# Patient Record
Sex: MALE | Race: WHITE | Employment: OTHER | URBAN - METROPOLITAN AREA
[De-identification: names, ages, dates, MRNs, and addresses within clinical notes are randomized per-mention and may not be internally consistent; named-entity substitution may affect disease eponyms.]

---

## 2020-09-08 ENCOUNTER — HOSPITAL ENCOUNTER (OUTPATIENT)
Dept: MRI IMAGING | Age: 70
Discharge: HOME | End: 2020-09-10
Payer: MEDICARE

## 2020-09-08 PROCEDURE — 70551 MRI BRAIN STEM W/O DYE: CPT

## 2021-12-14 ENCOUNTER — VIRTUAL VISIT (OUTPATIENT)
Dept: FAMILY MEDICINE CLINIC | Age: 71
End: 2021-12-14
Payer: MEDICARE

## 2021-12-14 DIAGNOSIS — R05.9 COUGH: Primary | ICD-10-CM

## 2021-12-14 DIAGNOSIS — Z11.52 ENCOUNTER FOR SCREENING FOR COVID-19: ICD-10-CM

## 2021-12-14 LAB
Lab: NORMAL
PERFORMING INSTRUMENT: NORMAL
QC PASS/FAIL: NORMAL
SARS-COV-2, POC: NORMAL

## 2021-12-14 PROCEDURE — 99441 PR PHYS/QHP TELEPHONE EVALUATION 5-10 MIN: CPT | Performed by: NURSE PRACTITIONER

## 2021-12-14 PROCEDURE — 87426 SARSCOV CORONAVIRUS AG IA: CPT | Performed by: NURSE PRACTITIONER

## 2021-12-14 SDOH — ECONOMIC STABILITY: FOOD INSECURITY: WITHIN THE PAST 12 MONTHS, THE FOOD YOU BOUGHT JUST DIDN'T LAST AND YOU DIDN'T HAVE MONEY TO GET MORE.: NEVER TRUE

## 2021-12-14 SDOH — ECONOMIC STABILITY: FOOD INSECURITY: WITHIN THE PAST 12 MONTHS, YOU WORRIED THAT YOUR FOOD WOULD RUN OUT BEFORE YOU GOT MONEY TO BUY MORE.: NEVER TRUE

## 2021-12-14 ASSESSMENT — ENCOUNTER SYMPTOMS
CHEST TIGHTNESS: 0
VOMITING: 0
ABDOMINAL PAIN: 0
SHORTNESS OF BREATH: 0
NAUSEA: 0
COUGH: 1
RHINORRHEA: 0
WHEEZING: 0
SORE THROAT: 0

## 2021-12-14 ASSESSMENT — PATIENT HEALTH QUESTIONNAIRE - PHQ9
SUM OF ALL RESPONSES TO PHQ QUESTIONS 1-9: 0
2. FEELING DOWN, DEPRESSED OR HOPELESS: 0
SUM OF ALL RESPONSES TO PHQ9 QUESTIONS 1 & 2: 0
SUM OF ALL RESPONSES TO PHQ QUESTIONS 1-9: 0
SUM OF ALL RESPONSES TO PHQ QUESTIONS 1-9: 0
1. LITTLE INTEREST OR PLEASURE IN DOING THINGS: 0

## 2021-12-14 ASSESSMENT — SOCIAL DETERMINANTS OF HEALTH (SDOH): HOW HARD IS IT FOR YOU TO PAY FOR THE VERY BASICS LIKE FOOD, HOUSING, MEDICAL CARE, AND HEATING?: NOT HARD AT ALL

## 2021-12-14 NOTE — LETTER
[address]  Phone: [phone]  Fax: [phone]    MAKI Avila - JAY    December 14, 2021     Gene Pryor MD  [address] [place] [address]    Patient: Lee King   MR Number: [id number]   YOB: 1950   Date of Visit: 12/14/2021       Dear Gene Pryor: Thank you for referring Nathaniel Esposito to me for evaluation/treatment. Below are the relevant portions of my assessment and plan of care:    1. Cough  Comments:  negative rapid test for SARS-CoV-2 viral antigen. continue symptomatic care- upright position, hydration, inhaler/ nebulizer prn. follow-up with PCP as planned. 2. Encounter for screening for COVID-19  Comments:  negative POC test for SARS-CoV-2. results discussed with patient. follow-up with PCP as planned; return to [facility] prn for acute illness. Orders:  -     POCT COVID-19, Antigen    SARS-COV-2, POC   Date Value Ref Range Status   12/14/2021 Not-Detected Not Detected Final     If you have questions, please do not hesitate to call me.         Sincerely,      MAKI Avila CNP

## 2021-12-14 NOTE — PROGRESS NOTES
[address] Encounter    TELEHEALTH VISIT -- Audio Only     SUBJECTIVE:    Mary Ellis is a 70 y.o. male evaluated via telephone on 12/14/2021. Consent:  Angelia Anirudh and/or health care decision maker is aware that he may receive a bill for this telephone service, depending on his insurance coverage, and has provided verbal consent to proceed: Yes    Documentation:  I communicated with the patient and/or health care decision maker about:  Chief Complaint   Patient presents with    Covid Testing     needs a rapid test before seeing Dr. Joseph Castellano        History of Present Illness:  Patient presents for COVID-19 screening requested by PCP prior to office visit for evaluation of cough. Presenting symptoms: productive cough- clear sputum. Was planning to see PCP for cough and PCP requested he be tested for COVID-19 before coming into the office. Patient states cough symptoms have improved, \"but I have COPD and I get pneumonia every year, so my wife wanted me to get in with my doctor and get checked out. \"    Symptoms began: ~ 1-2 weeks ago, waxing and waning, improved overall. Max temperature in last 24 hrs: afebrile. Patient denies: fever, chills, sore throat, nasal congestion, shortness of breath at rest, chest pain, diarrhea, nausea and vomiting, headache, muscle pain, abdominal pain, loss of smell, loss of taste, malaise. Treatment to date: nebulized albuterol BID, which has been effective. Exposure source: no known exposures to COVID-19. Relevant PMH: COPD, pneumonia. Non-smoker. No recent travel. Fully vaccinated for COVID-19 + booster. Review of Systems   Constitutional: Negative for chills, fever and unexpected weight change. HENT: Negative for congestion, rhinorrhea and sore throat. Respiratory: Positive for cough. Negative for chest tightness, shortness of breath and wheezing. Cardiovascular: Negative for chest pain.    Gastrointestinal: Negative for abdominal pain, nausea and vomiting. Musculoskeletal: Negative for myalgias. Neurological: Negative for light-headedness and headaches. OBJECTIVE:     Vital Signs: (As obtained by: pt or caregiver): patient did not have necessary equipment to check vitals. Exam: N/A  (telehealth audio visit)     POC Testing Today:   Lab Results   Component Value Date    COVID19 Not-Detected 12/14/2021       TELEHEALTH ASSESSMENT & PLAN:   Details of this discussion including any medical advice provided:     70 y.o. male with cough and need for COVID-19 screening. 1. Cough  Comments:  negative rapid test for SARS-CoV-2 viral antigen. continue symptomatic care- upright position, hydration, inhaler/ nebulizer prn. follow-up with PCP as planned. 2. Encounter for screening for COVID-19  Comments:  negative POC test for SARS-CoV-2. results discussed with patient. follow-up with PCP as planned; return to [facility] prn for acute illness. Orders:  -     POCT COVID-19, Antigen    - POC testing per orders. - Analgesia/ fever control with OTC acetaminophen prn.  - Advised on supportive measures- upright position, hydration, intranasal saline prn congestion.  - Red flags and expected time course for resolution were reviewed. - Follow-up with PCP as planned. Return for follow-up with PCP per routine; return to [facility] as needed. I affirm this is a Patient Initiated Episode with an Established Patient who has not had a related appointment within my department in the past 7 days or scheduled within the next 24 hours. Total Time: minutes: 5-10 minutes    TELEHEALTH EVALUATION -- Audio/Telephone (During ZARXJ-87 public health emergency)  This service was provided through telehealth, by MAKI Jones Cha, CNP and the patient in his/her home via telephone call. 5 minutes were spent on the phone with patient. Provider performed history of present illness and review of systems.  Diagnosis and treatment plan was discussed with patient. Pharmacy of choice was reviewed along with past medical history, medication allergies, and current medications. Education provided to patient or patient parents/guardian with current illness diagnosis as well as when to seek additional healthcare due to changing or for worsening symptoms. Patient voiced understanding.      Electronically signed by MAKI Khan CNP on 12/14/2021 at 10:32 AM

## 2021-12-14 NOTE — PATIENT INSTRUCTIONS
1. Results from rapid COVID-19 test were: negative. 2. Continue albuterol nebulizers per routine. 3. Follow-up with your PCP as planned. SARS-COV-2, POC   Date Value Ref Range Status   12/14/2021 Not-Detected Not Detected Final       Patient Education        Learning About Being High-Risk for Serious Illness From COVID-19  Who is at high risk? COVID-19 causes a mild illness in many people who have it. But certain things may increase your risk for more serious illness. These include:  · Age. ? Babies born premature or who are less than 3year old may be at high risk. ? The risk also increases with age. Older adults are at highest risk. · Asthma, cystic fibrosis, chronic obstructive pulmonary disease (COPD), and other chronic lung diseases. · Vaping or smoking or having a history of smoking. · Serious heart conditions, such as heart failure, coronary artery disease, or high blood pressure. · HIV. · A weakened immune system or taking medicines, such as steroids, that suppress the immune system. · Cancer or getting treatment for cancer. · Neurologic conditions or diseases that involve the nerves and brain, such as stroke, dementia, or cerebral palsy. · Being overweight (obesity). · Diabetes. · Chronic kidney disease. · Liver disease. · Substance use disorders. · Sickle cell disease. · Pregnancy. · Genetic, metabolic, or neurologic problems in children. This includes children who may have many health problems that affect many body systems. These problems may limit how well the child can do routine activities of daily life. · Down syndrome. This is not a complete list. If you have a chronic health problem, ask your doctor if you should take extra precautions. Should you get the COVID-19 vaccine if you have an underlying health problem? The simple answer is yes. The COVID-19 vaccine is safe and effective for almost everyone.  The only people advised not to get it are those who have had a severe allergic reaction to the vaccine's ingredients. Experts recommend getting the vaccine. This is especially true if you have an underlying health problem like diabetes, chronic lung disease, or obesity. Getting infected with COVID-19 can be much worse if you have conditions like these. If you have a weakened immune system, you may be at higher risk for getting seriously ill with COVID-19. The vaccine may not work as well for you, but it should still be safe. Talk with your doctor if you have any questions about the vaccine. How can you protect yourself and others? The best way to protect yourself from getting sick is to:  · Get vaccinated. · Avoid sick people. · If you are not fully vaccinated:  ? Wear a mask if you have to go to public areas. ? Avoid crowds and try to stay at least 6 feet away from other people. · Cover your mouth with a tissue when you cough or sneeze. · Wash your hands often, especially after you cough or sneeze. Use soap and water, and scrub for at least 20 seconds. If soap and water aren't available, use an alcohol-based hand . · Avoid touching your mouth, nose, and eyes. To help avoid spreading the virus to others:  · Get vaccinated. · Cover your mouth with a tissue when you cough or sneeze. · Wash your hands often, especially after you cough or sneeze. Use soap and water, and scrub for at least 20 seconds. If soap and water aren't available, use an alcohol-based hand . · If you have been exposed to the virus and are not fully vaccinated:  ? Stay home. Don't go to school, work, or public areas. And don't use public transportation, ride-shares, or taxis unless you have no choice. ? Wear a mask if you have to go to public areas, like the pharmacy. · If you're sick:  ? Leave your home only if you need to get medical care. But call the doctor's office first so they know you're coming. And wear a mask. ? Wear a mask whenever you're around other people. ?  Limit contact with pets and people in your home. If possible, stay in a separate bedroom and use a separate bathroom. ? Clean and disinfect your home every day. Use household  and disinfectant wipes or sprays. Take special care to clean things that you touch with your hands. Should you get the COVID-19 vaccine if you are pregnant? Talk with your doctor if you have any questions about the vaccine. Other vaccines, like the flu vaccine, are safely given in pregnancy. The risk of problems from the COVID-19 vaccine should be far smaller than the risks to you and your baby from having the infection. Where can you learn more? Go to https://[employer] (formerly [employer]Partnerpedia. org and sign in to your [employer] account. Enter A131 in the [employer] box to learn more about \"Learning About Being High-Risk for Serious Illness From COVID-19. \"     If you do not have an account, please click on the \"Sign Up Now\" link. Current as of: July 1, 2021               Content Version: 13.0  © 2006-2021 Healthwise, Incorporated. Care instructions adapted under license by [facility] ([facility]). If you have questions about a medical condition or this instruction, always ask your healthcare professional. [address] any warranty or liability for your use of this information.

## 2023-02-27 LAB
ESTIMATED AVERAGE GLUCOSE FOR HBA1C: 203 MG/DL
HEMOGLOBIN A1C/HEMOGLOBIN TOTAL IN BLOOD: 8.7 %
PROSTATE SPECIFIC ANTIGEN,SCREEN: 0.46 NG/ML (ref 0–4)

## 2023-03-06 ENCOUNTER — DOCUMENTATION (OUTPATIENT)
Dept: CARE COORDINATION | Facility: CLINIC | Age: 73
End: 2023-03-06
Payer: MEDICARE

## 2023-03-06 NOTE — PROGRESS NOTES
CM following for diabetes mngt ( AiC 8.7  2/28/23)  Hx of HTN COPD and proliferative diabetic retinopathy R eye..  No new episodes of care.   Per February PCP note, treated for AECOPD with antibiotic and steroids.   Cologuard ordered.   Patient is compliant with medications and appointments.    Patient states he is feeling better, continues to have productive cough of white secretions and using nebulizer and nebulizer as needed.  Patient states blood sugars were elevated while on steroid.    CM educates patient to contact PCP if secretions change color, have increased cough, or shortness of breath.   Patient states is waiting for Cologuard to arrive in mail.

## 2023-03-13 DIAGNOSIS — E11.59 TYPE 2 DIABETES MELLITUS WITH OTHER CIRCULATORY COMPLICATION, WITH LONG-TERM CURRENT USE OF INSULIN (MULTI): Primary | ICD-10-CM

## 2023-03-13 DIAGNOSIS — J44.9 CHRONIC OBSTRUCTIVE PULMONARY DISEASE, UNSPECIFIED COPD TYPE (MULTI): ICD-10-CM

## 2023-03-13 DIAGNOSIS — Z79.4 TYPE 2 DIABETES MELLITUS WITH OTHER CIRCULATORY COMPLICATION, WITH LONG-TERM CURRENT USE OF INSULIN (MULTI): Primary | ICD-10-CM

## 2023-03-13 RX ORDER — INSULIN GLARGINE 300 U/ML
INJECTION, SOLUTION SUBCUTANEOUS
Qty: 36 ML | Refills: 0 | Status: SHIPPED | OUTPATIENT
Start: 2023-03-13 | End: 2023-03-13 | Stop reason: SDUPTHER

## 2023-03-13 RX ORDER — INSULIN GLARGINE 300 U/ML
50 INJECTION, SOLUTION SUBCUTANEOUS 2 TIMES DAILY
Qty: 36 ML | Refills: 0 | Status: SHIPPED | OUTPATIENT
Start: 2023-03-13 | End: 2023-09-07 | Stop reason: DRUGHIGH

## 2023-03-15 ENCOUNTER — DOCUMENTATION (OUTPATIENT)
Dept: CARE COORDINATION | Facility: CLINIC | Age: 73
End: 2023-03-15
Payer: MEDICARE

## 2023-03-15 NOTE — PROGRESS NOTES
CM UPDATE:  Call from patient that cough has decreased to baseline; continues to use Duoneb nebulizer twice daily..  Patient states has completed steroids and glucose levels have normalized. Patient states has not received Colgard yet and has contacted PCP office for such    Carotid ultrasound with vascular surgery next week.

## 2023-03-16 DIAGNOSIS — I25.110 ATHEROSCLEROSIS OF NATIVE CORONARY ARTERY WITH UNSTABLE ANGINA PECTORIS, UNSPECIFIED WHETHER NATIVE OR TRANSPLANTED HEART (MULTI): ICD-10-CM

## 2023-03-16 RX ORDER — NITROGLYCERIN 0.4 MG/1
TABLET SUBLINGUAL
Qty: 25 TABLET | Refills: 0 | Status: SHIPPED | OUTPATIENT
Start: 2023-03-16

## 2023-03-24 DIAGNOSIS — J44.9 CHRONIC OBSTRUCTIVE PULMONARY DISEASE, UNSPECIFIED COPD TYPE (MULTI): ICD-10-CM

## 2023-03-24 RX ORDER — ALBUTEROL SULFATE 0.63 MG/3ML
0.63 SOLUTION RESPIRATORY (INHALATION) EVERY 6 HOURS PRN
Qty: 75 ML | Refills: 11 | Status: SHIPPED | OUTPATIENT
Start: 2023-03-24 | End: 2023-03-25

## 2023-03-25 RX ORDER — ALBUTEROL SULFATE 0.83 MG/ML
SOLUTION RESPIRATORY (INHALATION)
Qty: 75 ML | Refills: 11 | Status: SHIPPED | OUTPATIENT
Start: 2023-03-25 | End: 2023-03-31 | Stop reason: SDUPTHER

## 2023-03-31 DIAGNOSIS — J44.9 CHRONIC OBSTRUCTIVE PULMONARY DISEASE, UNSPECIFIED COPD TYPE (MULTI): ICD-10-CM

## 2023-03-31 RX ORDER — ALBUTEROL SULFATE 0.83 MG/ML
2.5 SOLUTION RESPIRATORY (INHALATION) EVERY 6 HOURS PRN
Qty: 75 ML | Refills: 11 | Status: SHIPPED | OUTPATIENT
Start: 2023-03-31 | End: 2023-06-01 | Stop reason: SDUPTHER

## 2023-04-03 ENCOUNTER — DOCUMENTATION (OUTPATIENT)
Dept: CARE COORDINATION | Facility: CLINIC | Age: 73
End: 2023-04-03
Payer: MEDICARE

## 2023-04-10 ENCOUNTER — DOCUMENTATION (OUTPATIENT)
Dept: CARE COORDINATION | Facility: CLINIC | Age: 73
End: 2023-04-10
Payer: MEDICARE

## 2023-04-10 SDOH — ECONOMIC STABILITY: INCOME INSECURITY: HOW HARD IS IT FOR YOU TO PAY FOR THE VERY BASICS LIKE FOOD, HOUSING, MEDICAL CARE, AND HEATING?: NOT HARD AT ALL

## 2023-04-10 SDOH — ECONOMIC STABILITY: FOOD INSECURITY: WITHIN THE PAST 12 MONTHS, YOU WORRIED THAT YOUR FOOD WOULD RUN OUT BEFORE YOU GOT MONEY TO BUY MORE.: NEVER TRUE

## 2023-04-10 SDOH — HEALTH STABILITY: MENTAL HEALTH: HOW MANY STANDARD DRINKS CONTAINING ALCOHOL DO YOU HAVE ON A TYPICAL DAY?: PATIENT DOES NOT DRINK

## 2023-04-10 SDOH — SOCIAL STABILITY: SOCIAL NETWORK: ARE YOU MARRIED, WIDOWED, DIVORCED, SEPARATED, NEVER MARRIED, OR LIVING WITH A PARTNER?: MARRIED

## 2023-04-10 SDOH — ECONOMIC STABILITY: TRANSPORTATION INSECURITY
IN THE PAST 12 MONTHS, HAS THE LACK OF TRANSPORTATION KEPT YOU FROM MEDICAL APPOINTMENTS OR FROM GETTING MEDICATIONS?: NO

## 2023-04-10 SDOH — ECONOMIC STABILITY: INCOME INSECURITY: IN THE LAST 12 MONTHS, WAS THERE A TIME WHEN YOU WERE NOT ABLE TO PAY THE MORTGAGE OR RENT ON TIME?: NO

## 2023-04-10 SDOH — ECONOMIC STABILITY: GENERAL: WOULD YOU LIKE HELP WITH ANY OF THE FOLLOWING NEEDS?: I DONT NEED HELP WITH ANY OF THESE

## 2023-04-19 ENCOUNTER — PATIENT OUTREACH (OUTPATIENT)
Dept: CARE COORDINATION | Facility: CLINIC | Age: 73
End: 2023-04-19
Payer: MEDICARE

## 2023-04-24 ENCOUNTER — DOCUMENTATION (OUTPATIENT)
Dept: CARE COORDINATION | Facility: CLINIC | Age: 73
End: 2023-04-24
Payer: MEDICARE

## 2023-04-28 ENCOUNTER — PATIENT OUTREACH (OUTPATIENT)
Dept: CARE COORDINATION | Facility: CLINIC | Age: 73
End: 2023-04-28
Payer: MEDICARE

## 2023-04-28 SDOH — ECONOMIC STABILITY: GENERAL: WOULD YOU LIKE HELP WITH ANY OF THE FOLLOWING NEEDS?: I DONT NEED HELP WITH ANY OF THESE

## 2023-05-10 ENCOUNTER — PATIENT OUTREACH (OUTPATIENT)
Dept: CARE COORDINATION | Facility: CLINIC | Age: 73
End: 2023-05-10
Payer: MEDICARE

## 2023-05-12 ENCOUNTER — PATIENT OUTREACH (OUTPATIENT)
Dept: CARE COORDINATION | Facility: CLINIC | Age: 73
End: 2023-05-12
Payer: MEDICARE

## 2023-05-17 ENCOUNTER — DOCUMENTATION (OUTPATIENT)
Dept: CARE COORDINATION | Facility: CLINIC | Age: 73
End: 2023-05-17
Payer: MEDICARE

## 2023-05-30 ENCOUNTER — APPOINTMENT (OUTPATIENT)
Dept: PRIMARY CARE | Facility: CLINIC | Age: 73
End: 2023-05-30
Payer: MEDICARE

## 2023-05-31 ENCOUNTER — DOCUMENTATION (OUTPATIENT)
Dept: CARE COORDINATION | Facility: CLINIC | Age: 73
End: 2023-05-31
Payer: MEDICARE

## 2023-06-01 DIAGNOSIS — J44.9 CHRONIC OBSTRUCTIVE PULMONARY DISEASE, UNSPECIFIED COPD TYPE (MULTI): ICD-10-CM

## 2023-06-01 RX ORDER — ALBUTEROL SULFATE 0.83 MG/ML
2.5 SOLUTION RESPIRATORY (INHALATION) EVERY 6 HOURS PRN
Qty: 75 ML | Refills: 11 | Status: SHIPPED | OUTPATIENT
Start: 2023-06-01 | End: 2023-09-07 | Stop reason: SDUPTHER

## 2023-06-02 ENCOUNTER — DOCUMENTATION (OUTPATIENT)
Dept: CARE COORDINATION | Facility: CLINIC | Age: 73
End: 2023-06-02
Payer: MEDICARE

## 2023-06-02 ENCOUNTER — PATIENT OUTREACH (OUTPATIENT)
Dept: CARE COORDINATION | Facility: CLINIC | Age: 73
End: 2023-06-02
Payer: MEDICARE

## 2023-06-02 SDOH — HEALTH STABILITY: MENTAL HEALTH: HOW OFTEN DO YOU HAVE A DRINK CONTAINING ALCOHOL?: NEVER

## 2023-06-02 SDOH — ECONOMIC STABILITY: FOOD INSECURITY: WITHIN THE PAST 12 MONTHS, YOU WORRIED THAT YOUR FOOD WOULD RUN OUT BEFORE YOU GOT MONEY TO BUY MORE.: NEVER TRUE

## 2023-06-02 SDOH — ECONOMIC STABILITY: TRANSPORTATION INSECURITY
IN THE PAST 12 MONTHS, HAS LACK OF TRANSPORTATION KEPT YOU FROM MEETINGS, WORK, OR FROM GETTING THINGS NEEDED FOR DAILY LIVING?: NO

## 2023-06-02 SDOH — ECONOMIC STABILITY: GENERAL: WOULD YOU LIKE HELP WITH ANY OF THE FOLLOWING NEEDS?: I DONT NEED HELP WITH ANY OF THESE

## 2023-06-02 SDOH — SOCIAL STABILITY: SOCIAL NETWORK: ARE YOU MARRIED, WIDOWED, DIVORCED, SEPARATED, NEVER MARRIED, OR LIVING WITH A PARTNER?: MARRIED

## 2023-06-02 SDOH — ECONOMIC STABILITY: INCOME INSECURITY: IN THE LAST 12 MONTHS, WAS THERE A TIME WHEN YOU WERE NOT ABLE TO PAY THE MORTGAGE OR RENT ON TIME?: NO

## 2023-06-02 NOTE — PROGRESS NOTES
[facility] 5/29/23 to 6/1/23 with AECOPD and new onset heart failure.   Home on Lasix and Prednisone taper.  Appointment with pulmonology 6/7/23.  Patient will contact PCP and Dr Domingo for hospital follow up appointments.  educated patient to weight self daily reportable parameters, and read food labels to decrease salt intake to 2 gm per day.   Patient states no longer adding salt to food    Engagement  Call Start Time: 1218 (6/2/2023 12:17 PM)    Medications  Medications reviewed with patient/caregiver?: Yes (6/2/2023 12:17 PM)  Is the patient having any side effects they believe may be caused by any medication additions or changes?: No (6/2/2023 12:17 PM)  Does the patient have all medications ordered at discharge?: Yes (6/2/2023 12:17 PM)  Care Management Interventions: No intervention needed (6/2/2023 12:17 PM)  Is the patient taking all medications as directed (includes completed medication regime)?: Yes (6/2/2023 12:17 PM)    Appointments  Does the patient have a primary care provider?: Yes (6/2/2023 12:17 PM)  Care Management Interventions: Advised patient to make appointment (6/2/2023 12:17 PM)  Has the patient kept scheduled appointments due by today?: Yes (6/2/2023 12:17 PM)    Patient Teaching  Does the patient have access to their discharge instructions?: Yes (6/2/2023 12:17 PM)  Care Management Interventions: Reviewed instructions with patient (6/2/2023 12:17 PM)  What is the patient's perception of their health status since discharge?: Improving (6/2/2023 12:17 PM)      Pam OLGUIN RN CCM  RN Care Coordinator  [facility]  Phone [phone]

## 2023-06-05 ENCOUNTER — PATIENT OUTREACH (OUTPATIENT)
Dept: CARE COORDINATION | Facility: CLINIC | Age: 73
End: 2023-06-05
Payer: MEDICARE

## 2023-06-08 ENCOUNTER — OFFICE VISIT (OUTPATIENT)
Dept: PRIMARY CARE | Facility: CLINIC | Age: 73
End: 2023-06-08
Payer: MEDICARE

## 2023-06-08 VITALS
WEIGHT: 285 LBS | HEIGHT: 75 IN | TEMPERATURE: 98 F | DIASTOLIC BLOOD PRESSURE: 60 MMHG | HEART RATE: 65 BPM | SYSTOLIC BLOOD PRESSURE: 90 MMHG | BODY MASS INDEX: 35.43 KG/M2

## 2023-06-08 DIAGNOSIS — E11.3511 PROLIFERATIVE DIABETIC RETINOPATHY OF RIGHT EYE WITH MACULAR EDEMA ASSOCIATED WITH TYPE 2 DIABETES MELLITUS (MULTI): ICD-10-CM

## 2023-06-08 DIAGNOSIS — E11.51 TYPE 2 DIABETES MELLITUS WITH DIABETIC PERIPHERAL ANGIOPATHY WITHOUT GANGRENE, WITH LONG-TERM CURRENT USE OF INSULIN (MULTI): Primary | ICD-10-CM

## 2023-06-08 DIAGNOSIS — J44.9 CHRONIC OBSTRUCTIVE PULMONARY DISEASE, UNSPECIFIED COPD TYPE (MULTI): ICD-10-CM

## 2023-06-08 DIAGNOSIS — I25.10 CORONARY ARTERY DISEASE INVOLVING NATIVE CORONARY ARTERY OF NATIVE HEART WITHOUT ANGINA PECTORIS: ICD-10-CM

## 2023-06-08 DIAGNOSIS — Z79.4 TYPE 2 DIABETES MELLITUS WITH DIABETIC PERIPHERAL ANGIOPATHY WITHOUT GANGRENE, WITH LONG-TERM CURRENT USE OF INSULIN (MULTI): Primary | ICD-10-CM

## 2023-06-08 DIAGNOSIS — E66.01 OBESITY, MORBID (MULTI): ICD-10-CM

## 2023-06-08 PROBLEM — I65.29 OCCLUSION AND STENOSIS OF UNSPECIFIED CAROTID ARTERY: Status: ACTIVE | Noted: 2018-06-04

## 2023-06-08 PROBLEM — I10 ESSENTIAL HYPERTENSION, BENIGN: Status: ACTIVE | Noted: 2018-02-01

## 2023-06-08 PROBLEM — K21.9 GERD WITHOUT ESOPHAGITIS: Status: ACTIVE | Noted: 2019-04-07

## 2023-06-08 PROBLEM — I65.23 BILATERAL CAROTID ARTERY STENOSIS: Status: ACTIVE | Noted: 2023-06-08

## 2023-06-08 PROBLEM — E78.2 MIXED HYPERLIPIDEMIA: Status: ACTIVE | Noted: 2018-05-04

## 2023-06-08 PROBLEM — H34.8120 CENTRAL RETINAL VEIN OCCLUSION WITH MACULAR EDEMA OF LEFT EYE (CMS-HCC): Status: ACTIVE | Noted: 2023-06-08

## 2023-06-08 PROCEDURE — 3052F HG A1C>EQUAL 8.0%<EQUAL 9.0%: CPT | Performed by: INTERNAL MEDICINE

## 2023-06-08 PROCEDURE — 3066F NEPHROPATHY DOC TX: CPT | Performed by: INTERNAL MEDICINE

## 2023-06-08 PROCEDURE — 3074F SYST BP LT 130 MM HG: CPT | Performed by: INTERNAL MEDICINE

## 2023-06-08 PROCEDURE — 3008F BODY MASS INDEX DOCD: CPT | Performed by: INTERNAL MEDICINE

## 2023-06-08 PROCEDURE — 1157F ADVNC CARE PLAN IN RCRD: CPT | Performed by: INTERNAL MEDICINE

## 2023-06-08 PROCEDURE — 4010F ACE/ARB THERAPY RXD/TAKEN: CPT | Performed by: INTERNAL MEDICINE

## 2023-06-08 PROCEDURE — 99214 OFFICE O/P EST MOD 30 MIN: CPT | Performed by: INTERNAL MEDICINE

## 2023-06-08 PROCEDURE — 3078F DIAST BP <80 MM HG: CPT | Performed by: INTERNAL MEDICINE

## 2023-06-08 PROCEDURE — 1036F TOBACCO NON-USER: CPT | Performed by: INTERNAL MEDICINE

## 2023-06-08 PROCEDURE — 1160F RVW MEDS BY RX/DR IN RCRD: CPT | Performed by: INTERNAL MEDICINE

## 2023-06-08 PROCEDURE — 1159F MED LIST DOCD IN RCRD: CPT | Performed by: INTERNAL MEDICINE

## 2023-06-08 RX ORDER — AMLODIPINE BESYLATE 10 MG/1
10 TABLET ORAL EVERY EVENING
COMMUNITY
End: 2023-11-07

## 2023-06-08 RX ORDER — LANOLIN ALCOHOL/MO/W.PET/CERES
1 CREAM (GRAM) TOPICAL DAILY
COMMUNITY

## 2023-06-08 RX ORDER — INSULIN ASPART 100 [IU]/ML
INJECTION, SOLUTION INTRAVENOUS; SUBCUTANEOUS
COMMUNITY

## 2023-06-08 RX ORDER — PANTOPRAZOLE SODIUM 40 MG/1
1 TABLET, DELAYED RELEASE ORAL DAILY
COMMUNITY

## 2023-06-08 RX ORDER — CLOPIDOGREL BISULFATE 75 MG/1
75 TABLET ORAL DAILY
COMMUNITY
End: 2023-07-31

## 2023-06-08 RX ORDER — FUROSEMIDE 20 MG/1
1 TABLET ORAL DAILY
Qty: 29 TABLET | Refills: 0 | COMMUNITY
Start: 2023-06-01 | End: 2024-03-08 | Stop reason: ALTCHOICE

## 2023-06-08 RX ORDER — LOSARTAN POTASSIUM 50 MG/1
50 TABLET ORAL 2 TIMES DAILY
COMMUNITY
End: 2023-09-07 | Stop reason: DRUGHIGH

## 2023-06-08 RX ORDER — METFORMIN HYDROCHLORIDE 1000 MG/1
TABLET ORAL 2 TIMES DAILY
COMMUNITY
End: 2024-04-15

## 2023-06-08 RX ORDER — IPRATROPIUM BROMIDE AND ALBUTEROL SULFATE 2.5; .5 MG/3ML; MG/3ML
3 SOLUTION RESPIRATORY (INHALATION) EVERY 8 HOURS PRN
COMMUNITY
Start: 2020-01-23 | End: 2023-12-11 | Stop reason: SDUPTHER

## 2023-06-08 RX ORDER — BIMATOPROST 0.1 MG/ML
SOLUTION/ DROPS OPHTHALMIC
COMMUNITY
End: 2024-02-09 | Stop reason: SDUPTHER

## 2023-06-08 RX ORDER — ASPIRIN 81 MG/1
1 TABLET ORAL DAILY
COMMUNITY
Start: 2007-12-04

## 2023-06-08 RX ORDER — CARVEDILOL 12.5 MG/1
12.5 TABLET ORAL
COMMUNITY
End: 2023-12-04

## 2023-06-08 RX ORDER — DORZOLAMIDE HYDROCHLORIDE AND TIMOLOL MALEATE 20; 5 MG/ML; MG/ML
1 SOLUTION/ DROPS OPHTHALMIC 2 TIMES DAILY
COMMUNITY
Start: 2020-10-20 | End: 2024-02-09 | Stop reason: WASHOUT

## 2023-06-08 RX ORDER — BUDESONIDE AND FORMOTEROL FUMARATE DIHYDRATE 160; 4.5 UG/1; UG/1
2 AEROSOL RESPIRATORY (INHALATION) 2 TIMES DAILY
COMMUNITY
Start: 2022-04-20 | End: 2023-12-07 | Stop reason: WASHOUT

## 2023-06-08 RX ORDER — ATORVASTATIN CALCIUM 80 MG/1
80 TABLET, FILM COATED ORAL NIGHTLY
COMMUNITY
End: 2024-01-29

## 2023-06-08 ASSESSMENT — ENCOUNTER SYMPTOMS
VISUAL CHANGE: 1
ABDOMINAL PAIN: 0
BLURRED VISION: 1
SHORTNESS OF BREATH: 1
FATIGUE: 0
CONSTITUTIONAL NEGATIVE: 1
DIAPHORESIS: 0
GASTROINTESTINAL NEGATIVE: 1
CHILLS: 0
MUSCULOSKELETAL NEGATIVE: 1
COUGH: 0
CARDIOVASCULAR NEGATIVE: 1
NEUROLOGICAL NEGATIVE: 1

## 2023-06-08 ASSESSMENT — COPD QUESTIONNAIRES: COPD: 1

## 2023-06-08 NOTE — PROGRESS NOTES
Subjective   Patient ID: Haile Mcfarland is a 72 y.o. male who presents for After hospital visit.    Heart Problem  This is a chronic problem. The current episode started more than 1 year ago. The problem occurs intermittently. The problem has been resolved. Associated symptoms include a visual change. Pertinent negatives include no abdominal pain, chills, coughing, diaphoresis or fatigue. The treatment provided mild relief.   Diabetes  He presents for his follow-up diabetic visit. He has type 2 diabetes mellitus. No MedicAlert identification noted. His disease course has been stable. Associated symptoms include blurred vision, foot paresthesias and visual change. Pertinent negatives for diabetes include no fatigue. Symptoms are stable. Diabetic complications include heart disease and peripheral neuropathy. Risk factors for coronary artery disease include dyslipidemia, hypertension and obesity. Current diabetic treatment includes insulin injections and oral agent (monotherapy). He is compliant with treatment all of the time. There is no change in his home blood glucose trend.   Shortness of Breath  This is a chronic problem. The current episode started 1 to 4 weeks ago. The problem occurs every several days. The problem has been unchanged. Pertinent negatives include no abdominal pain or leg swelling. The treatment provided mild relief. His past medical history is significant for CAD and COPD. There is no history of a heart failure, PE or pneumonia.        Review of Systems   Constitutional: Negative.  Negative for chills, diaphoresis and fatigue.   HENT: Negative.     Eyes:  Positive for blurred vision and visual disturbance.   Respiratory:  Positive for shortness of breath. Negative for cough.    Cardiovascular: Negative.  Negative for leg swelling.   Gastrointestinal: Negative.  Negative for abdominal pain.   Musculoskeletal: Negative.    Skin: Negative.    Neurological: Negative.        Objective   BP 90/60 (BP  "Location: Right arm, Patient Position: Sitting, BP Cuff Size: Adult)   Pulse 65   Temp 36.7 °C (98 °F) (Temporal)   Ht 1.905 m (6' 3\")   Wt 129 kg (285 lb)   BMI 35.62 kg/m²     Physical Exam  Vitals reviewed.   Constitutional:       Appearance: Normal appearance. He is obese.   HENT:      Head: Normocephalic.   Eyes:      Conjunctiva/sclera: Conjunctivae normal.   Cardiovascular:      Rate and Rhythm: Normal rate and regular rhythm.      Pulses: Normal pulses.   Pulmonary:      Effort: Pulmonary effort is normal.      Breath sounds: Normal breath sounds.   Abdominal:      Palpations: Abdomen is soft.   Musculoskeletal:         General: Normal range of motion.      Cervical back: Normal range of motion.   Skin:     General: Skin is warm and dry.   Neurological:      General: No focal deficit present.       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 Pt is doing well, precautions to be taken in the future for acute ailments or acute illness and change of condition are discussed, will continue to have close follow up, medication list was reviewed and updated and necessary changes were applied.   Pt does not have any angina lately, aware of using NTG if needed, aware to notify " MD if any new chest pains happens. Compliance is appropriate. Statin therapy reviewed,     Pt has moderate to severe COPD. It is progressive disease, use of MDI and proper technique discussed, rinse mouth after inhaled corticosteroids. Notify if progressive dyspnea or cough or lethargy, monitor oxygen saturation if possible with home based pulse oximetry. Avoid hospitalization and call us if any flare ups are about to happen, be sure that annual flu vaccine are uptodate and review need for pneumococcal vaccine. Light to moderate low impact exercises are very helpful and deep breathing  exercises are beneficial in chronic lung diseases.  Hospitalization data does not say what exactly happened to him, slightly high BNP but no congestive heart failure, there is no fluid overload, echo was normal, spiral CT of chest did not show any pulmonary congestion pulmonary edema or even pleural effusion, he responded with diuretics, he is slightly hypotensive today, he remains asymptomatic, told him to discontinue Lasix or any sort of diuretics and also cut down losartan to 50 mg once a day only, his blood sugars are appropriately controlled and maintained, he has a severe COPD, he did not have any angina, all the report and information were reviewed and relayed, he is oxygen saturation is 94% here, inhaler and nebulizer to be continued, vision remains impaired, unexplained hospitalization but he is feeling fine, follow-up in 3 months with a diabetic panel.

## 2023-06-09 ENCOUNTER — PATIENT OUTREACH (OUTPATIENT)
Dept: CARE COORDINATION | Facility: CLINIC | Age: 73
End: 2023-06-09
Payer: MEDICARE

## 2023-06-09 DIAGNOSIS — Z79.4 TYPE 2 DIABETES MELLITUS WITH DIABETIC PERIPHERAL ANGIOPATHY WITHOUT GANGRENE, WITH LONG-TERM CURRENT USE OF INSULIN (MULTI): ICD-10-CM

## 2023-06-09 DIAGNOSIS — E11.51 TYPE 2 DIABETES MELLITUS WITH DIABETIC PERIPHERAL ANGIOPATHY WITHOUT GANGRENE, WITH LONG-TERM CURRENT USE OF INSULIN (MULTI): ICD-10-CM

## 2023-06-09 RX ORDER — BLOOD SUGAR DIAGNOSTIC
3 STRIP MISCELLANEOUS 3 TIMES DAILY
COMMUNITY
Start: 2023-04-03 | End: 2023-06-09 | Stop reason: SDUPTHER

## 2023-06-09 NOTE — TELEPHONE ENCOUNTER
Krystal called she is very upset.  Asked why is he taken off the hydrochlorothiazide and he was dx with CHF and then something in the report said that he has prostate cancer.

## 2023-06-13 RX ORDER — BLOOD SUGAR DIAGNOSTIC
3 STRIP MISCELLANEOUS 3 TIMES DAILY
Qty: 270 STRIP | Refills: 3 | Status: SHIPPED | OUTPATIENT
Start: 2023-06-13 | End: 2023-09-11

## 2023-06-19 ENCOUNTER — PATIENT OUTREACH (OUTPATIENT)
Dept: CARE COORDINATION | Facility: CLINIC | Age: 73
End: 2023-06-19
Payer: MEDICARE

## 2023-07-07 ENCOUNTER — PATIENT OUTREACH (OUTPATIENT)
Dept: CARE COORDINATION | Facility: CLINIC | Age: 73
End: 2023-07-07
Payer: MEDICARE

## 2023-07-31 DIAGNOSIS — Z79.4 TYPE 2 DIABETES MELLITUS WITH DIABETIC PERIPHERAL ANGIOPATHY WITHOUT GANGRENE, WITH LONG-TERM CURRENT USE OF INSULIN (MULTI): ICD-10-CM

## 2023-07-31 DIAGNOSIS — E11.51 TYPE 2 DIABETES MELLITUS WITH DIABETIC PERIPHERAL ANGIOPATHY WITHOUT GANGRENE, WITH LONG-TERM CURRENT USE OF INSULIN (MULTI): ICD-10-CM

## 2023-07-31 RX ORDER — CLOPIDOGREL BISULFATE 75 MG/1
75 TABLET ORAL DAILY
Qty: 90 TABLET | Refills: 3 | Status: SHIPPED | OUTPATIENT
Start: 2023-07-31

## 2023-08-04 ENCOUNTER — PATIENT OUTREACH (OUTPATIENT)
Dept: CARE COORDINATION | Facility: CLINIC | Age: 73
End: 2023-08-04
Payer: MEDICARE

## 2023-08-04 NOTE — PROGRESS NOTES
Monthly outreach to patient for complex case management.   No new episodes of care    Patient states there is a shortage of Albuterol( Proair)  nebulizer solution,, only able to get  short term supplies, and is stretching out supply.  Patient reports expectorating thick white to yellow sputum.  No shortness of breath noted by this writer.   This writer educates patient to contact his PCP to report.  Patient agrees to do such.    Pam OLGUIN RN CCM  RN Care Coordinator  [facility]  Phone [phone]

## 2023-08-09 ENCOUNTER — PATIENT OUTREACH (OUTPATIENT)
Dept: CARE COORDINATION | Facility: CLINIC | Age: 73
End: 2023-08-09
Payer: MEDICARE

## 2023-08-09 NOTE — PROGRESS NOTES
Call from patient who voices concern may needs surgery on left eye if no improvement at September visit.  No cough or shortness of breath noted.   Appts with PCP and cardiology early part of September.    Pam OLGUIN RN CCM  RN Care Coordinator  [facility]  Phone [phone]

## 2023-08-23 ENCOUNTER — DOCUMENTATION (OUTPATIENT)
Dept: PRIMARY CARE | Facility: CLINIC | Age: 73
End: 2023-08-23
Payer: MEDICARE

## 2023-08-23 ENCOUNTER — DOCUMENTATION (OUTPATIENT)
Dept: CARE COORDINATION | Facility: CLINIC | Age: 73
End: 2023-08-23
Payer: MEDICARE

## 2023-08-23 ENCOUNTER — PATIENT OUTREACH (OUTPATIENT)
Dept: CARE COORDINATION | Facility: CLINIC | Age: 73
End: 2023-08-23
Payer: MEDICARE

## 2023-08-23 DIAGNOSIS — J44.1 CHRONIC OBSTRUCTIVE PULMONARY DISEASE WITH ACUTE EXACERBATION (MULTI): Primary | ICD-10-CM

## 2023-08-23 DIAGNOSIS — J44.9 CHRONIC OBSTRUCTIVE PULMONARY DISEASE, UNSPECIFIED COPD TYPE (MULTI): ICD-10-CM

## 2023-08-23 RX ORDER — PREDNISONE 20 MG/1
40 TABLET ORAL DAILY
COMMUNITY
Start: 2023-08-21 | End: 2023-12-07 | Stop reason: WASHOUT

## 2023-08-23 RX ORDER — IPRATROPIUM BROMIDE AND ALBUTEROL SULFATE 2.5; .5 MG/3ML; MG/3ML
3 SOLUTION RESPIRATORY (INHALATION)
Qty: 360 ML | Refills: 11 | Status: SHIPPED | OUTPATIENT
Start: 2023-08-23 | End: 2023-12-07 | Stop reason: WASHOUT

## 2023-08-23 RX ORDER — DOXYCYCLINE HYCLATE 100 MG
100 TABLET ORAL 2 TIMES DAILY
COMMUNITY
Start: 2023-08-21 | End: 2023-08-25

## 2023-08-23 RX ORDER — ALBUTEROL SULFATE 0.83 MG/ML
2.5 SOLUTION RESPIRATORY (INHALATION) EVERY 6 HOURS PRN
Qty: 75 ML | Refills: 11 | OUTPATIENT
Start: 2023-08-23 | End: 2024-08-22

## 2023-08-23 SDOH — ECONOMIC STABILITY: GENERAL: WOULD YOU LIKE HELP WITH ANY OF THE FOLLOWING NEEDS?: I DONT NEED HELP WITH ANY OF THESE

## 2023-08-23 SDOH — ECONOMIC STABILITY: FOOD INSECURITY
ARE ANY OF YOUR NEEDS URGENT? FOR EXAMPLE, UNCERTAINTY OF WHERE YOU WILL GET YOUR NEXT MEAL OR NOT HAVING THE MEDICATIONS YOU NEED TO TAKE TOMORROW.: NO

## 2023-08-23 NOTE — PROGRESS NOTES
Discharge Facility: [facility]  Discharge Diagnosis: COPD exacerbation  Admission Date: 8/19/23  Discharge Date: 8/22/23    PCP Appointment Date: 9/7/23 Message sent to office in attempt to move up   Specialist Appointment Date: DELMIS Rodriguez   Hospital Encounter and Summary: Linked   See discharge assessment below for further details    Engagement  Call Start Time: 1055 (8/23/2023 11:03 AM)    Medications  Medications reviewed with patient/caregiver?: Yes (8/23/2023 11:03 AM)  Is the patient having any side effects they believe may be caused by any medication additions or changes?: No (8/23/2023 11:03 AM)  Does the patient have all medications ordered at discharge?: Yes (8/23/2023 11:03 AM)  Care Management Interventions: Provided patient education (8/23/2023 11:03 AM)  Prescription Comments: On doxycycline, prednisone (8/23/2023 11:03 AM)  Is the patient taking all medications as directed (includes completed medication regime)?: No (8/23/2023 11:03 AM)  What is preventing the patient from taking all medications as directed?: Other (Comment) (8/23/2023 11:03 AM)  Care Management Interventions: Notified provider; Notified pharmacy for assistance (8/23/2023 11:03 AM)  Medication Comments: Patient states his albuterol aerosol is on backorder, I called his pharmacy and they said it is on national backorder but [facility] has in stock, message sent to office about sending new script (8/23/2023 11:03 AM)    Appointments  Does the patient have a primary care provider?: Yes (8/23/2023 11:03 AM)  Care Management Interventions: Educated patient on importance of making appointment (8/23/2023 11:03 AM)  Has the patient kept scheduled appointments due by today?: Yes (8/23/2023 11:03 AM)  Care Management Interventions: Advised to schedule with specialist (8/23/2023 11:03 AM)    Patient Teaching  Does the patient have access to their discharge instructions?: Yes (8/23/2023 11:03 AM)  Care Management Interventions: Reviewed  instructions with patient (8/23/2023 11:03 AM)  What is the patient's perception of their health status since discharge?: Returned to baseline/stable (8/23/2023 11:03 AM)  Is the patient/caregiver able to teach back the hierarchy of who to call/visit for symptoms/problems? PCP, Specialist, Home Health nurse, Urgent Care, ED, 911: Yes (8/23/2023 11:03 AM)  Patient/Caregiver Education Comments: Aware to continue medications as ordered, use aerosol treatments as needed for shortness of breath (8/23/2023 11:03 AM)    Wrap Up  Call End Time: 1100 (8/23/2023 11:03 AM)

## 2023-08-23 NOTE — PROGRESS NOTES
"08/23/2023  Patient recently discharged from the hospital for a COPD exacerbation.  He has a past medical history of CAD status post stents, COPD not on home oxygen, hypertension, hyperlipidemia, type 2 diabetes, CKD stage III.  He and I reviewed his discharge instructions, medications in detail and also his follow up appointments.  He has appointments with his pcp, cardiologist and still needs to make an appointment with his pulmonologist.  I offered to make an appointment for him/with him, he declined and stated he needs to go over his calendar.  He lives with his wife of 40 years, drives and manages his home well.   He has 20 steps up to the bedroom and 20 steps down to the laundry room.  He does see his \"foot\" doctor tomorrow.  At this time he has no questions and did keep my contact information for future use.  aleciam  "

## 2023-08-23 NOTE — PROGRESS NOTES
Talked with patient regarding hospital follow up. He states albuterol is on national backorder per [facility]. I called pharmacy and spoke with pharmacist, he said albuterol is available at [facility] but due to patient's insurance a new script is needed.     Can Dr Ozuna please send new script for albuterol aerosol which is on file to [facility] [phone] and please call patient to let him know? Thank you!

## 2023-09-01 ENCOUNTER — LAB (OUTPATIENT)
Dept: LAB | Facility: LAB | Age: 73
End: 2023-09-01
Payer: MEDICARE

## 2023-09-01 DIAGNOSIS — E11.3511 PROLIFERATIVE DIABETIC RETINOPATHY OF RIGHT EYE WITH MACULAR EDEMA ASSOCIATED WITH TYPE 2 DIABETES MELLITUS (MULTI): ICD-10-CM

## 2023-09-01 DIAGNOSIS — E66.01 OBESITY, MORBID (MULTI): ICD-10-CM

## 2023-09-01 DIAGNOSIS — I25.10 CORONARY ARTERY DISEASE INVOLVING NATIVE CORONARY ARTERY OF NATIVE HEART WITHOUT ANGINA PECTORIS: ICD-10-CM

## 2023-09-01 DIAGNOSIS — J44.9 CHRONIC OBSTRUCTIVE PULMONARY DISEASE, UNSPECIFIED COPD TYPE (MULTI): ICD-10-CM

## 2023-09-01 DIAGNOSIS — Z79.4 TYPE 2 DIABETES MELLITUS WITH DIABETIC PERIPHERAL ANGIOPATHY WITHOUT GANGRENE, WITH LONG-TERM CURRENT USE OF INSULIN (MULTI): ICD-10-CM

## 2023-09-01 DIAGNOSIS — E11.51 TYPE 2 DIABETES MELLITUS WITH DIABETIC PERIPHERAL ANGIOPATHY WITHOUT GANGRENE, WITH LONG-TERM CURRENT USE OF INSULIN (MULTI): ICD-10-CM

## 2023-09-01 DIAGNOSIS — E11.51 TYPE 2 DIABETES MELLITUS WITH DIABETIC PERIPHERAL ANGIOPATHY WITHOUT GANGRENE, WITH LONG-TERM CURRENT USE OF INSULIN (MULTI): Primary | ICD-10-CM

## 2023-09-01 DIAGNOSIS — Z79.4 TYPE 2 DIABETES MELLITUS WITH DIABETIC PERIPHERAL ANGIOPATHY WITHOUT GANGRENE, WITH LONG-TERM CURRENT USE OF INSULIN (MULTI): Primary | ICD-10-CM

## 2023-09-01 LAB
ALANINE AMINOTRANSFERASE (SGPT) (U/L) IN SER/PLAS: 22 U/L (ref 10–52)
ALBUMIN (G/DL) IN SER/PLAS: 3.8 G/DL (ref 3.4–5)
ALKALINE PHOSPHATASE (U/L) IN SER/PLAS: 96 U/L (ref 33–136)
ANION GAP IN SER/PLAS: 11 MMOL/L (ref 10–20)
ASPARTATE AMINOTRANSFERASE (SGOT) (U/L) IN SER/PLAS: 17 U/L (ref 9–39)
BILIRUBIN TOTAL (MG/DL) IN SER/PLAS: 0.5 MG/DL (ref 0–1.2)
CALCIUM (MG/DL) IN SER/PLAS: 8.7 MG/DL (ref 8.6–10.3)
CARBON DIOXIDE, TOTAL (MMOL/L) IN SER/PLAS: 29 MMOL/L (ref 21–32)
CHLORIDE (MMOL/L) IN SER/PLAS: 106 MMOL/L (ref 98–107)
CHOLESTEROL (MG/DL) IN SER/PLAS: 104 MG/DL (ref 0–199)
CHOLESTEROL IN HDL (MG/DL) IN SER/PLAS: 31.7 MG/DL
CHOLESTEROL/HDL RATIO: 3.3
CREATININE (MG/DL) IN SER/PLAS: 1.1 MG/DL (ref 0.5–1.3)
ERYTHROCYTE DISTRIBUTION WIDTH (RATIO) BY AUTOMATED COUNT: 14.8 % (ref 11.5–14.5)
ERYTHROCYTE MEAN CORPUSCULAR HEMOGLOBIN CONCENTRATION (G/DL) BY AUTOMATED: 32.2 G/DL (ref 32–36)
ERYTHROCYTE MEAN CORPUSCULAR VOLUME (FL) BY AUTOMATED COUNT: 92 FL (ref 80–100)
ERYTHROCYTES (10*6/UL) IN BLOOD BY AUTOMATED COUNT: 4.23 X10E12/L (ref 4.5–5.9)
ESTIMATED AVERAGE GLUCOSE FOR HBA1C: 212 MG/DL
GFR MALE: 71 ML/MIN/1.73M2
GLUCOSE (MG/DL) IN SER/PLAS: 118 MG/DL (ref 74–99)
HEMATOCRIT (%) IN BLOOD BY AUTOMATED COUNT: 38.8 % (ref 41–52)
HEMOGLOBIN (G/DL) IN BLOOD: 12.5 G/DL (ref 13.5–17.5)
HEMOGLOBIN A1C/HEMOGLOBIN TOTAL IN BLOOD: 9 %
LDL: 59 MG/DL (ref 0–99)
LEUKOCYTES (10*3/UL) IN BLOOD BY AUTOMATED COUNT: 15.1 X10E9/L (ref 4.4–11.3)
PLATELETS (10*3/UL) IN BLOOD AUTOMATED COUNT: 289 X10E9/L (ref 150–450)
POTASSIUM (MMOL/L) IN SER/PLAS: 4.2 MMOL/L (ref 3.5–5.3)
PROTEIN TOTAL: 6.5 G/DL (ref 6.4–8.2)
SODIUM (MMOL/L) IN SER/PLAS: 142 MMOL/L (ref 136–145)
TRIGLYCERIDE (MG/DL) IN SER/PLAS: 68 MG/DL (ref 0–149)
UREA NITROGEN (MG/DL) IN SER/PLAS: 30 MG/DL (ref 6–23)
VLDL: 14 MG/DL (ref 0–40)

## 2023-09-01 PROCEDURE — 86803 HEPATITIS C AB TEST: CPT

## 2023-09-01 PROCEDURE — 36415 COLL VENOUS BLD VENIPUNCTURE: CPT

## 2023-09-01 PROCEDURE — 83036 HEMOGLOBIN GLYCOSYLATED A1C: CPT

## 2023-09-02 LAB — HEPATITIS C VIRUS AB PRESENCE IN SERUM: NONREACTIVE

## 2023-09-06 ENCOUNTER — PATIENT OUTREACH (OUTPATIENT)
Dept: CARE COORDINATION | Facility: CLINIC | Age: 73
End: 2023-09-06
Payer: MEDICARE

## 2023-09-07 ENCOUNTER — OFFICE VISIT (OUTPATIENT)
Dept: PRIMARY CARE | Facility: CLINIC | Age: 73
End: 2023-09-07
Payer: MEDICARE

## 2023-09-07 VITALS
SYSTOLIC BLOOD PRESSURE: 122 MMHG | TEMPERATURE: 96.9 F | HEIGHT: 71 IN | DIASTOLIC BLOOD PRESSURE: 79 MMHG | WEIGHT: 293 LBS | HEART RATE: 68 BPM | BODY MASS INDEX: 41.02 KG/M2

## 2023-09-07 DIAGNOSIS — I25.118 ATHEROSCLEROTIC HEART DISEASE OF NATIVE CORONARY ARTERY WITH OTHER FORMS OF ANGINA PECTORIS (CMS-HCC): ICD-10-CM

## 2023-09-07 DIAGNOSIS — J44.9 CHRONIC OBSTRUCTIVE PULMONARY DISEASE, UNSPECIFIED COPD TYPE (MULTI): Primary | ICD-10-CM

## 2023-09-07 DIAGNOSIS — E11.59 TYPE 2 DIABETES MELLITUS WITH OTHER CIRCULATORY COMPLICATION, WITH LONG-TERM CURRENT USE OF INSULIN (MULTI): ICD-10-CM

## 2023-09-07 DIAGNOSIS — Z79.4 TYPE 2 DIABETES MELLITUS WITH OTHER CIRCULATORY COMPLICATION, WITH LONG-TERM CURRENT USE OF INSULIN (MULTI): ICD-10-CM

## 2023-09-07 DIAGNOSIS — Z23 NEED FOR INFLUENZA VACCINATION: ICD-10-CM

## 2023-09-07 PROCEDURE — 3052F HG A1C>EQUAL 8.0%<EQUAL 9.0%: CPT | Performed by: INTERNAL MEDICINE

## 2023-09-07 PROCEDURE — 3078F DIAST BP <80 MM HG: CPT | Performed by: INTERNAL MEDICINE

## 2023-09-07 PROCEDURE — 1036F TOBACCO NON-USER: CPT | Performed by: INTERNAL MEDICINE

## 2023-09-07 PROCEDURE — 3008F BODY MASS INDEX DOCD: CPT | Performed by: INTERNAL MEDICINE

## 2023-09-07 PROCEDURE — 99213 OFFICE O/P EST LOW 20 MIN: CPT | Performed by: INTERNAL MEDICINE

## 2023-09-07 PROCEDURE — 4010F ACE/ARB THERAPY RXD/TAKEN: CPT | Performed by: INTERNAL MEDICINE

## 2023-09-07 PROCEDURE — 1157F ADVNC CARE PLAN IN RCRD: CPT | Performed by: INTERNAL MEDICINE

## 2023-09-07 PROCEDURE — 90662 IIV NO PRSV INCREASED AG IM: CPT | Performed by: INTERNAL MEDICINE

## 2023-09-07 PROCEDURE — 1160F RVW MEDS BY RX/DR IN RCRD: CPT | Performed by: INTERNAL MEDICINE

## 2023-09-07 PROCEDURE — 1159F MED LIST DOCD IN RCRD: CPT | Performed by: INTERNAL MEDICINE

## 2023-09-07 PROCEDURE — 3074F SYST BP LT 130 MM HG: CPT | Performed by: INTERNAL MEDICINE

## 2023-09-07 PROCEDURE — G0008 ADMIN INFLUENZA VIRUS VAC: HCPCS | Performed by: INTERNAL MEDICINE

## 2023-09-07 PROCEDURE — 3066F NEPHROPATHY DOC TX: CPT | Performed by: INTERNAL MEDICINE

## 2023-09-07 RX ORDER — INSULIN GLARGINE 300 U/ML
40 INJECTION, SOLUTION SUBCUTANEOUS 2 TIMES DAILY
Qty: 36 ML | Refills: 0 | Status: SHIPPED
Start: 2023-09-07 | End: 2023-09-07 | Stop reason: DRUGHIGH

## 2023-09-07 RX ORDER — INSULIN GLARGINE 300 U/ML
INJECTION, SOLUTION SUBCUTANEOUS
Qty: 36 ML | Refills: 0 | Status: SHIPPED
Start: 2023-09-07 | End: 2023-09-16

## 2023-09-07 RX ORDER — LOSARTAN POTASSIUM 50 MG/1
50 TABLET ORAL DAILY
Status: SHIPPED
Start: 2023-09-07 | End: 2023-12-04

## 2023-09-07 ASSESSMENT — ENCOUNTER SYMPTOMS
GASTROINTESTINAL NEGATIVE: 1
COUGH: 1
CARDIOVASCULAR NEGATIVE: 1
MUSCULOSKELETAL NEGATIVE: 1
CONSTITUTIONAL NEGATIVE: 1
EYES NEGATIVE: 1
NEUROLOGICAL NEGATIVE: 1
SHORTNESS OF BREATH: 1

## 2023-09-07 NOTE — PROGRESS NOTES
"Subjective   Patient ID: Haile Mcfarland is a 72 y.o. male who presents for Follow-up (3 mo and hosp fu).    HPI   Heart Problem  This is a chronic problem. The current episode started more than 1 year ago. The problem occurs intermittently. The problem has been resolved. Associated symptoms include a visual change. Pertinent negatives include no abdominal pain, chills, coughing, diaphoresis or fatigue. The treatment provided mild relief.   Diabetes  He presents for his follow-up diabetic visit. He has type 2 diabetes mellitus. No MedicAlert identification noted. His disease course has been stable. Associated symptoms include blurred vision, foot paresthesias and visual change. Pertinent negatives for diabetes include no fatigue. Symptoms are stable. Diabetic complications include heart disease and peripheral neuropathy. Risk factors for coronary artery disease include dyslipidemia, hypertension and obesity. Current diabetic treatment includes insulin injections and oral agent (monotherapy). He is compliant with treatment all of the time. There is no change in his home blood glucose trend.   Shortness of Breath  This is a chronic problem. The current episode started 1 to 4 weeks ago. The problem occurs every several days. The problem has been unchanged. Pertinent negatives include no abdominal pain or leg swelling. The treatment provided mild relief. His past medical history is significant for CAD and COPD. There is no history of a heart failure, PE or pneumonia.      Review of Systems   Constitutional: Negative.    HENT: Negative.     Eyes: Negative.    Respiratory:  Positive for cough and shortness of breath.    Cardiovascular: Negative.    Gastrointestinal: Negative.    Musculoskeletal: Negative.    Skin: Negative.    Neurological: Negative.        Objective   Temp 36.1 °C (96.9 °F) (Temporal)   Ht 1.797 m (5' 10.75\")   Wt 133 kg (293 lb)   BMI 41.15 kg/m²     Physical Exam  Vitals reviewed.   Constitutional: "       Appearance: Normal appearance. He is obese.   HENT:      Head: Normocephalic.   Eyes:      Conjunctiva/sclera: Conjunctivae normal.   Cardiovascular:      Rate and Rhythm: Normal rate and regular rhythm.      Pulses: Normal pulses.   Pulmonary:      Effort: Pulmonary effort is normal.      Breath sounds: Normal breath sounds.   Abdominal:      General: Abdomen is protuberant.      Palpations: Abdomen is soft.   Musculoskeletal:         General: Normal range of motion.      Cervical back: Normal range of motion.   Skin:     General: Skin is warm and dry.   Neurological:      General: No focal deficit present.   Psychiatric:         Mood and Affect: Mood normal.       Assessment/Plan   Problem List Items Addressed This Visit       Atherosclerotic heart disease of native coronary artery with other forms of angina pectoris (CMS/HCC)    Chronic obstructive pulmonary disease (CMS/HCC) - Primary     Other Visit Diagnoses       Need for influenza vaccination        Type 2 diabetes mellitus with other circulatory complication, with long-term current use of insulin (CMS/[facility])        Relevant Medications    insulin glargine (Toujeo SoloStar U-300 Insulin) 300 unit/mL (1.5 mL) injection        Recent hospitalization data and information reviewed, all reports, labs, radiological investigations and consultations and follow ups reviewed during this visit. Pt is doing well, precautions to be taken in the future for acute ailments or acute illness and change of condition are discussed, will continue to have close follow up, medication list was reviewed and updated and necessary changes were applied.  He has another hospitalization, this time another time they state that it was a COPD flareup, atelectasis and some nonspecific findings were present on a CT chest, every time he goes to ER at the end of doing CT scan of chest and I just cannot understand this amount of CT scans has been done without any clinical suspicion for the  need for it.  No angina lately, because of steroid therapy patient's hemoglobin A1c was high, hospital load him with the steroids, he is feeling better, his oxygenation's were reviewed, flu vaccine was given, inhaler therapy was reviewed, no change in medications, would not alter insulin therapy, we will try our best to avoid hospitalization, follow-up in 3 months.

## 2023-09-11 ENCOUNTER — PATIENT OUTREACH (OUTPATIENT)
Dept: CARE COORDINATION | Facility: CLINIC | Age: 73
End: 2023-09-11
Payer: MEDICARE

## 2023-09-11 NOTE — PROGRESS NOTES
09/11/2023 Attempted to outreach patient to check in on his past appointment and remind him of his upcoming appointments. Left a brando escoto

## 2023-09-12 ENCOUNTER — PATIENT OUTREACH (OUTPATIENT)
Dept: CARE COORDINATION | Facility: CLINIC | Age: 73
End: 2023-09-12
Payer: MEDICARE

## 2023-09-12 PROBLEM — E11.39: Status: ACTIVE | Noted: 2023-09-12

## 2023-09-12 PROBLEM — Z96.659 HISTORY OF TOTAL KNEE ARTHROPLASTY: Status: ACTIVE | Noted: 2020-07-16

## 2023-09-12 PROBLEM — K90.0: Status: ACTIVE | Noted: 2020-07-16

## 2023-09-12 PROBLEM — Z86.14 PERSONAL HISTORY OF METHICILLIN RESISTANT STAPHYLOCOCCUS AUREUS INFECTION: Status: ACTIVE | Noted: 2020-07-16

## 2023-09-12 PROBLEM — E11.9 DM TYPE 2 WITHOUT RETINOPATHY (MULTI): Status: ACTIVE | Noted: 2018-08-20

## 2023-09-12 PROBLEM — M17.11 PRIMARY OSTEOARTHRITIS OF RIGHT KNEE: Status: ACTIVE | Noted: 2019-02-16

## 2023-09-12 PROBLEM — H42: Status: ACTIVE | Noted: 2023-09-12

## 2023-09-12 PROBLEM — I25.10 CAD S/P PERCUTANEOUS CORONARY ANGIOPLASTY: Status: ACTIVE | Noted: 2023-09-12

## 2023-09-12 PROBLEM — R06.02 SHORTNESS OF BREATH: Status: ACTIVE | Noted: 2023-09-12

## 2023-09-12 PROBLEM — Z98.61 CAD S/P PERCUTANEOUS CORONARY ANGIOPLASTY: Status: ACTIVE | Noted: 2023-09-12

## 2023-09-12 PROBLEM — R55 SYNCOPE: Status: ACTIVE | Noted: 2023-09-12

## 2023-09-12 PROBLEM — R07.89 CHEST PAIN, NON-CARDIAC: Status: ACTIVE | Noted: 2023-09-12

## 2023-09-12 PROBLEM — D64.9 ANEMIA: Status: ACTIVE | Noted: 2023-09-12

## 2023-09-12 PROBLEM — I11.0 HYPERTENSIVE HEART DISEASE WITH HEART FAILURE (MULTI): Status: ACTIVE | Noted: 2018-06-04

## 2023-09-12 PROBLEM — H43.822 VITREOMACULAR TRACTION SYNDROME OF LEFT EYE: Status: ACTIVE | Noted: 2023-09-12

## 2023-09-12 RX ORDER — KETOROLAC TROMETHAMINE 4 MG/ML
1 SOLUTION/ DROPS OPHTHALMIC 4 TIMES DAILY
COMMUNITY
Start: 2023-03-31 | End: 2024-02-09 | Stop reason: WASHOUT

## 2023-09-12 RX ORDER — LEVOFLOXACIN 750 MG/1
1 TABLET ORAL DAILY
COMMUNITY
Start: 2022-12-20 | End: 2023-11-17 | Stop reason: SDUPTHER

## 2023-09-12 RX ORDER — METRONIDAZOLE 500 MG/1
1 TABLET ORAL EVERY 8 HOURS PRN
COMMUNITY
Start: 2022-12-20 | End: 2023-12-07 | Stop reason: WASHOUT

## 2023-09-12 RX ORDER — HYDROCHLOROTHIAZIDE 25 MG/1
25 TABLET ORAL
COMMUNITY
Start: 2023-08-07 | End: 2023-11-07

## 2023-09-12 RX ORDER — BRINZOLAMIDE/BRIMONIDINE TARTRATE 10; 2 MG/ML; MG/ML
SUSPENSION/ DROPS OPHTHALMIC
COMMUNITY
Start: 2023-08-01 | End: 2024-02-09 | Stop reason: SDUPTHER

## 2023-09-12 NOTE — PROGRESS NOTES
Call regarding appt. with PCP on 9/6/23 after hospitalization.  At time of outreach call the patient feels as if their condition has improved since last visit.  Reviewed the PCP appointment with the pt and addressed any questions or concerns. He knows COPD is something that waxes and wanes sometimes by the day but so far he has been feeling well last few days. Mentioned the weather changes and how he seems to have issues with that, using nebulizer as needed. Plans to ask Pulm in Dec regarding a new nebulizer if medicare covers. Will transition back to [facility] on 9/22.

## 2023-09-20 ENCOUNTER — PATIENT OUTREACH (OUTPATIENT)
Dept: CARE COORDINATION | Facility: CLINIC | Age: 73
End: 2023-09-20
Payer: MEDICARE

## 2023-09-20 NOTE — PROGRESS NOTES
Monthly case management outreach.   Inpatient episode of care 8/19/23 to 8/22/23 for exacerbation of COPD.   Discharged home with no needs  on Doxycycline and Prednisone.  Transition of care outreach completed by [facility] nurse who is following for 30 day transition period.  [facility]err 9/7 PCP hospital follow up appt  still has residual cough and shortness of breath using inhaler as needed.  Patient has had 3 inpatient episodes of care this year with discharge on Prednisone: Aic increased to 9.0  on 9/1/23 from 8.7 previously.  No medication changes at September cardiology appt.   Follows closely with retinal specialist and general ophthalmology  Appt with pulmonology in December. .   Patient is agreeable for this writer to try to move up pulmonology appt.   Patient is relieved he passed his vision test to renew his drivers license.     Voice mail to [facility] office to request sooner appt.    Pam OLGUIN RN CCM  RN Care Coordinator  [facility]  Phone [phone]

## 2023-10-05 ENCOUNTER — APPOINTMENT (OUTPATIENT)
Dept: PULMONOLOGY | Facility: CLINIC | Age: 73
End: 2023-10-05
Payer: MEDICARE

## 2023-10-06 ENCOUNTER — OFFICE VISIT (OUTPATIENT)
Dept: OPHTHALMOLOGY | Facility: CLINIC | Age: 73
End: 2023-10-06
Payer: MEDICARE

## 2023-10-06 DIAGNOSIS — Z96.1 PSEUDOPHAKIA: ICD-10-CM

## 2023-10-06 DIAGNOSIS — H42 NEOVASCULAR GLAUCOMA DUE TO TYPE 2 DIABETES MELLITUS (MULTI): Primary | ICD-10-CM

## 2023-10-06 DIAGNOSIS — E11.39 NEOVASCULAR GLAUCOMA DUE TO TYPE 2 DIABETES MELLITUS (MULTI): Primary | ICD-10-CM

## 2023-10-06 PROCEDURE — 99213 OFFICE O/P EST LOW 20 MIN: CPT | Performed by: OPHTHALMOLOGY

## 2023-10-06 ASSESSMENT — PACHYMETRY
OD_CT(UM): 700
OS_CT(UM): 656

## 2023-10-06 ASSESSMENT — CONF VISUAL FIELD
OD_INFERIOR_TEMPORAL_RESTRICTION: 3
OS_INFERIOR_NASAL_RESTRICTION: 0
METHOD: COUNTING FINGERS
OS_SUPERIOR_NASAL_RESTRICTION: 0
OS_SUPERIOR_TEMPORAL_RESTRICTION: 0
OS_NORMAL: 1
OD_SUPERIOR_TEMPORAL_RESTRICTION: 3
OD_SUPERIOR_NASAL_RESTRICTION: 3
OD_INFERIOR_NASAL_RESTRICTION: 3
OS_INFERIOR_TEMPORAL_RESTRICTION: 0

## 2023-10-06 ASSESSMENT — CUP TO DISC RATIO
OD_RATIO: 0.5
OS_RATIO: 0.3

## 2023-10-06 ASSESSMENT — REFRACTION_WEARINGRX
OD_ADD: +2.50
OS_ADD: +2.50
OS_SPHERE: +1.00
OD_SPHERE: +1.00
OD_CYLINDER: -0.75
OD_AXIS: 170
OS_AXIS: 065
OS_CYLINDER: -1.50

## 2023-10-06 ASSESSMENT — SLIT LAMP EXAM - LIDS
COMMENTS: NORMAL
COMMENTS: NORMAL

## 2023-10-06 ASSESSMENT — VISUAL ACUITY
METHOD: SNELLEN - LINEAR
OD_CC: CF @ 2FT
CORRECTION_TYPE: GLASSES
OS_CC: 20/30

## 2023-10-06 ASSESSMENT — ENCOUNTER SYMPTOMS: EYES NEGATIVE: 1

## 2023-10-06 ASSESSMENT — TONOMETRY
OS_IOP_MMHG: 16
IOP_METHOD: GOLDMANN APPLANATION
OD_IOP_MMHG: 14

## 2023-10-06 ASSESSMENT — EXTERNAL EXAM - LEFT EYE: OS_EXAM: NORMAL

## 2023-10-06 ASSESSMENT — EXTERNAL EXAM - RIGHT EYE: OD_EXAM: NORMAL

## 2023-10-06 NOTE — PROGRESS NOTES
Subjective   Patient ID: Haile Mcfarland is a 73 y.o. male.    Chief Complaint    Glaucoma       HPI       Glaucoma    In right eye.  Treatment compliance is always.             Comments    73yoM here for 4 month F/U for Old neovascular right eye (OD), c/o redness left eye (OS) starting 1 day ago, patient using simbrinza BID right eye (OD) lumigan at bedtime right eye (OD), and pred acetate BID left eye (OS), no vision changes since SYD, no floaters, no flashes, no itchiness, no burning, no pain, no tearing, no discharge,           Last edited by AMIRAH Pulido on 10/6/2023 10:00 AM.        Current Outpatient Medications (Ophthalmology pharm classes)   Medication Sig Dispense Refill    dorzolamide-timoloL (Cosopt) 22.3-6.8 mg/mL ophthalmic solution 1 drop twice a day.      Lumigan 0.01 % ophthalmic solution INSTILL 1 DROP INTO RIGHT EYE AT BEDTIME      Simbrinza 1-0.2 % drops,suspension       ketorolac (Acular) 0.4 % ophthalmic solution Administer 1 drop into the left eye 4 times a day.       Current Outpatient Medications (Other)   Medication Sig Dispense Refill    aspirin 81 mg EC tablet Take 1 tablet (81 mg) by mouth once daily.      atorvastatin (Lipitor) 80 mg tablet Take 1 tablet (80 mg) by mouth once daily at bedtime.      carvedilol (Coreg) 12.5 mg tablet Take 1 tablet (12.5 mg) by mouth 2 times a day with meals.      clopidogrel (Plavix) 75 mg tablet Take 1 tablet by mouth once daily 90 tablet 3    hydroCHLOROthiazide (HYDRODiuril) 25 mg tablet Take 1 tablet (25 mg) by mouth once daily in the morning. Take before meals.      insulin glargine (Toujeo SoloStar U-300 Insulin) 300 unit/mL (1.5 mL) injection INJECT 50 UNITS SUBCUTANEOUSLY TWICE DAILY 36 mL 0    ipratropium-albuteroL (Duo-Neb) 0.5-2.5 mg/3 mL nebulizer solution Inhale 3 mL every 8 hours if needed.      ipratropium-albuteroL (Duo-Neb) 0.5-2.5 mg/3 mL nebulizer solution Take 3 mL by nebulization 4 times a day. 360 mL 11    losartan (Cozaar)  50 mg tablet Take 1 tablet (50 mg) by mouth once daily.      magnesium oxide (Mag-Ox) 400 mg (241.3 mg magnesium) tablet Take 1 tablet (400 mg) by mouth once daily.      metFORMIN (Glucophage) 1,000 mg tablet Take by mouth twice a day.      nitroglycerin (Nitrostat) 0.4 mg SL tablet DISSOLVE ONE TABLET UNDER THE TONGUE EVERY 5 MINUTES AS NEEDED FOR CHEST PAIN.  DO NOT EXCEED A TOTAL OF 3 DOSES IN 15 MINUTES 25 tablet 0    NovoLOG FlexPen U-100 Insulin 100 unit/mL (3 mL) pen INJECT 25 UNITS SUBCUTANEOUSLY THREE TIMES DAILY BEFORE MEAL(S)      amLODIPine (Norvasc) 10 mg tablet Take 1 tablet (10 mg) by mouth once daily in the evening.      budesonide-formoteroL (Symbicort) 160-4.5 mcg/actuation inhaler Inhale 2 puffs 2 times a day.      furosemide (Lasix) 20 mg tablet Take 1 tablet (20 mg) by mouth once daily. 29 tablet 0    levoFLOXacin (Levaquin) 750 mg tablet Take 1 tablet (750 mg) by mouth once daily.      metroNIDAZOLE (Flagyl) 500 mg tablet Take 1 tablet (500 mg) by mouth every 8 hours if needed.      pantoprazole (ProtoNix) 40 mg EC tablet Take 1 tablet (40 mg) by mouth once daily.      predniSONE (Deltasone) 20 mg tablet Take 2 tablets (40 mg) by mouth once daily.         Objective   Base Eye Exam       Visual Acuity (Snellen - Linear)         Right Left    Dist cc CF @ 2ft 20/30      Correction: Glasses              Tonometry (Goldmann Applanation, 10:10 AM)         Right Left    Pressure 14 16              Pachymetry (10/6/2023)         Right Left    Thickness 700 656              Pupils         Pupils    Right PERRL, No APD    Left PERRL, No APD              Visual Fields (Counting fingers)         Left Right     Full                      HM in all quadrants OD             Extraocular Movement         Right Left     Full, Ortho Full, Ortho              Neuro/Psych       Oriented x3: Yes    Mood/Affect: Normal                  Slit Lamp and Fundus Exam       External Exam         Right Left    External Normal  Normal              Slit Lamp Exam         Right Left    Lids/Lashes Normal Normal    Conjunctiva/Sclera Trace Injection Trace Injection    Cornea Clear Clear    Anterior Chamber Deep and quiet Deep and quiet    Iris regressed NVI, ectropion uvea Round and reactive    Lens Posterior chamber intraocular lens, 1+ Posterior capsular opacification Posterior chamber intraocular lens, 3+ Posterior capsular opacification    Anterior Vitreous Normal Normal              Fundus Exam         Right Left    Disc 3+ pallor     C/D Ratio 0.5 0.3    Macula poor foveal reflex Clinically significant macular edema, Mild clinically significant macular edema    Vessels 1-2+ attenuated 1-2+ attenuated                  Refraction       Wearing Rx         Sphere Cylinder Axis Add    Right +1.00 -0.75 170 +2.50    Left +1.00 -1.50 065 +2.50                    Assessment/Plan     Last dilated:  6/2/23    1.  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  IOP had been controlled, but recently had been elevating.  On gonio, there is no active NVA, but the angle is 90% scarred with PAS.  Given that IOP is only 30 mmHg on no meds, there is probably acqueous hyposecretion.  Will try medications first.  Pt with h/o quad bypass, so will avoid beta-blocker      Briefly mentioned Ahmed shunt. On simbrinza and lumigan OD -> IOP much better.  IOP remains well controlled.     Plan:  cont simbrinza OD BID             cont lumigan OD QHS             f/u 4 months    2.  Pseudophakia (PCIOL) OU:  minimal PCO OD, s/p YAG Cap OS 5/15/23 with dramatic improvement of VA      Plan:  monitor    3.  Proliferative Diabetic Retinopathy OU:  s/p PRP OU.  +CSME OS       Plan:  cont prednisolone and ketorolac OS QID

## 2023-10-11 ENCOUNTER — OFFICE VISIT (OUTPATIENT)
Dept: PULMONOLOGY | Facility: CLINIC | Age: 73
End: 2023-10-11
Payer: MEDICARE

## 2023-10-11 ENCOUNTER — HOSPITAL ENCOUNTER (OUTPATIENT)
Dept: RADIOLOGY | Facility: HOSPITAL | Age: 73
Discharge: HOME | End: 2023-10-11
Payer: MEDICARE

## 2023-10-11 VITALS
OXYGEN SATURATION: 93 % | BODY MASS INDEX: 41.22 KG/M2 | WEIGHT: 293.5 LBS | DIASTOLIC BLOOD PRESSURE: 70 MMHG | TEMPERATURE: 97.5 F | SYSTOLIC BLOOD PRESSURE: 120 MMHG | RESPIRATION RATE: 16 BRPM | HEART RATE: 64 BPM

## 2023-10-11 DIAGNOSIS — J18.9 PNEUMONIA DUE TO INFECTIOUS ORGANISM, UNSPECIFIED LATERALITY, UNSPECIFIED PART OF LUNG: ICD-10-CM

## 2023-10-11 DIAGNOSIS — J44.9 OBSTRUCTIVE AIRWAY DISEASE (MULTI): ICD-10-CM

## 2023-10-11 DIAGNOSIS — J42 CHRONIC BRONCHITIS, UNSPECIFIED CHRONIC BRONCHITIS TYPE (MULTI): Primary | ICD-10-CM

## 2023-10-11 PROCEDURE — 3074F SYST BP LT 130 MM HG: CPT

## 2023-10-11 PROCEDURE — 99214 OFFICE O/P EST MOD 30 MIN: CPT

## 2023-10-11 PROCEDURE — 4010F ACE/ARB THERAPY RXD/TAKEN: CPT

## 2023-10-11 PROCEDURE — 71046 X-RAY EXAM CHEST 2 VIEWS: CPT | Performed by: RADIOLOGY

## 2023-10-11 PROCEDURE — 1036F TOBACCO NON-USER: CPT

## 2023-10-11 PROCEDURE — 3008F BODY MASS INDEX DOCD: CPT

## 2023-10-11 PROCEDURE — 1159F MED LIST DOCD IN RCRD: CPT

## 2023-10-11 PROCEDURE — 3066F NEPHROPATHY DOC TX: CPT

## 2023-10-11 PROCEDURE — 3078F DIAST BP <80 MM HG: CPT

## 2023-10-11 PROCEDURE — 3052F HG A1C>EQUAL 8.0%<EQUAL 9.0%: CPT

## 2023-10-11 PROCEDURE — 1160F RVW MEDS BY RX/DR IN RCRD: CPT

## 2023-10-11 PROCEDURE — 71046 X-RAY EXAM CHEST 2 VIEWS: CPT

## 2023-10-11 RX ORDER — FLUTICASONE FUROATE AND VILANTEROL 200; 25 UG/1; UG/1
1 POWDER RESPIRATORY (INHALATION) DAILY
COMMUNITY
End: 2024-03-08 | Stop reason: ALTCHOICE

## 2023-10-11 RX ORDER — FLUTICASONE FUROATE, UMECLIDINIUM BROMIDE AND VILANTEROL TRIFENATATE 100; 62.5; 25 UG/1; UG/1; UG/1
1 POWDER RESPIRATORY (INHALATION) DAILY
Qty: 28 EACH | Refills: 6 | Status: SHIPPED | OUTPATIENT
Start: 2023-10-11 | End: 2023-12-07 | Stop reason: WASHOUT

## 2023-10-11 RX ORDER — UMECLIDINIUM BROMIDE AND VILANTEROL TRIFENATATE 62.5; 25 UG/1; UG/1
1 POWDER RESPIRATORY (INHALATION) DAILY
Qty: 60 EACH | Refills: 3 | Status: SHIPPED | OUTPATIENT
Start: 2023-10-11 | End: 2023-10-11 | Stop reason: ENTERED-IN-ERROR

## 2023-10-11 NOTE — PROGRESS NOTES
Patient: Haile Mcfarland    [id number]  : 1950 -- AGE 73 y.o.    Provider: [facility]     Location [facility]   Service Date: 10/11/2023              [facility]  Follow Up Visit Note      HISTORY OF PRESENT ILLNESS       HISTORY OF PRESENT ILLNESS   Haile Mcfarland is a 73 y.o. male who presents to a [facility] for a follow up evaluation with concerns for COPD and hospital follow up for COPD exacerbation. He is a never smoker.    I have independently interviewed and examined the patient in the office and reviewed available records.    Current History    On today's visit, the patient reports he wento the ER for SOB and wheezing, he was admitted for 3 days. He states he had pneumonia, after reviewing ED note he was diagnosed with a COPD exacerbation not pneumonia. He was discharged home with antibiotics, steroids and breo inhaler. He ran out of the Breo inhaler, he was using once a day. He is using his nebulizer 3 times a day. He was coughing up tan/green mucus but has decreased, he reports feeling better but not back to baseline. He reports feeling ROE sometimes. Denies wheezing, Sob at rest, chest pain, fevers and chills.     23: He had an ED visit 23 for SOB found to be having a congestive heart failure exacerbation, he was admitted for 3 days and sent home with lasix 20mg a day, he reports his bilateral leg swelling has decreased significantly in the past week. He has an appt with his cardiologist next week. He reports his respiratory status is stable, and that he is feeling better. He uses his symbicort 2 or 3 times a month and nebulizer every morning, never needs to use his proair. Occasionally coughs up clear mucus. He denies ROE unless he is really pushing himself. He wears a mask to cut his grass.     Previous pulmonary history: COPD possible asthma with airway remodeling, childhood  asthma    Inhalers/nebulized medications: Symbicort, Breo, albuterol nebs    Hospitalization History: He has not been hospitalized over the last year for breathing related problem.    Sleep history: Denies snoring, apnea, feeling tired during the day or taking naps during the day.     ALLERGIES AND MEDICATIONS     ALLERGIES  Allergies   Allergen Reactions    Penicillins Other and Unknown     From childhood. Unsure of reaction.       MEDICATIONS  Current Outpatient Medications   Medication Sig Dispense Refill    amLODIPine (Norvasc) 10 mg tablet Take 1 tablet (10 mg) by mouth once daily in the evening.      aspirin 81 mg EC tablet Take 1 tablet (81 mg) by mouth once daily.      atorvastatin (Lipitor) 80 mg tablet Take 1 tablet (80 mg) by mouth once daily at bedtime.      budesonide-formoteroL (Symbicort) 160-4.5 mcg/actuation inhaler Inhale 2 puffs 2 times a day.      carvedilol (Coreg) 12.5 mg tablet Take 1 tablet (12.5 mg) by mouth 2 times a day with meals.      clopidogrel (Plavix) 75 mg tablet Take 1 tablet by mouth once daily 90 tablet 3    dorzolamide-timoloL (Cosopt) 22.3-6.8 mg/mL ophthalmic solution 1 drop twice a day.      furosemide (Lasix) 20 mg tablet Take 1 tablet (20 mg) by mouth once daily. 29 tablet 0    hydroCHLOROthiazide (HYDRODiuril) 25 mg tablet Take 1 tablet (25 mg) by mouth once daily in the morning. Take before meals.      insulin glargine (Toujeo SoloStar U-300 Insulin) 300 unit/mL (1.5 mL) injection INJECT 50 UNITS SUBCUTANEOUSLY TWICE DAILY 36 mL 0    ipratropium-albuteroL (Duo-Neb) 0.5-2.5 mg/3 mL nebulizer solution Inhale 3 mL every 8 hours if needed.      ipratropium-albuteroL (Duo-Neb) 0.5-2.5 mg/3 mL nebulizer solution Take 3 mL by nebulization 4 times a day. 360 mL 11    ketorolac (Acular) 0.4 % ophthalmic solution Administer 1 drop into the left eye 4 times a day.      levoFLOXacin (Levaquin) 750 mg tablet Take 1 tablet (750 mg) by mouth once daily.      losartan (Cozaar) 50 mg  tablet Take 1 tablet (50 mg) by mouth once daily.      Lumigan 0.01 % ophthalmic solution INSTILL 1 DROP INTO RIGHT EYE AT BEDTIME      magnesium oxide (Mag-Ox) 400 mg (241.3 mg magnesium) tablet Take 1 tablet (400 mg) by mouth once daily.      metFORMIN (Glucophage) 1,000 mg tablet Take by mouth twice a day.      metroNIDAZOLE (Flagyl) 500 mg tablet Take 1 tablet (500 mg) by mouth every 8 hours if needed.      nitroglycerin (Nitrostat) 0.4 mg SL tablet DISSOLVE ONE TABLET UNDER THE TONGUE EVERY 5 MINUTES AS NEEDED FOR CHEST PAIN.  DO NOT EXCEED A TOTAL OF 3 DOSES IN 15 MINUTES 25 tablet 0    NovoLOG FlexPen U-100 Insulin 100 unit/mL (3 mL) pen INJECT 25 UNITS SUBCUTANEOUSLY THREE TIMES DAILY BEFORE MEAL(S)      pantoprazole (ProtoNix) 40 mg EC tablet Take 1 tablet (40 mg) by mouth once daily.      predniSONE (Deltasone) 20 mg tablet Take 2 tablets (40 mg) by mouth once daily.      Simbrinza 1-0.2 % drops,suspension        No current facility-administered medications for this visit.         PAST HISTORY     PAST MEDICAL HISTORY  COPD  CHF  HTN  HLD  DM2    PAST SURGICAL HISTORY  Past Surgical History:   Procedure Laterality Date    CATARACT EXTRACTION      CT NECK ANGIO W AND WO IV CONTRAST  02/03/2021    CT NECK ANGIO W AND WO IV CONTRAST 2/3/2021 [facility] CLINICAL LEGACY    OTHER SURGICAL HISTORY  04/24/2019    Cervical laminectomy    OTHER SURGICAL HISTORY  10/21/2021    Knee replacement    OTHER SURGICAL HISTORY  10/21/2021    Percutaneous transluminal coronary angioplasty    OTHER SURGICAL HISTORY  10/21/2021    Kidney surgery    OTHER SURGICAL HISTORY  10/21/2021    Neck surgery    OTHER SURGICAL HISTORY  10/21/2021    Colonoscopy    OTHER SURGICAL HISTORY  10/21/2021    Tonsillectomy    OTHER SURGICAL HISTORY  10/21/2021    PTA carotid       IMMUNIZATION HISTORY  Immunization History   Administered Date(s) Administered    Flu vaccine, quadrivalent, high-dose, preservative free, age 65y+ (FLUZONE) 01/04/2022,  09/07/2023    Influenza, High Dose Seasonal, Preservative Free 01/16/2018, 09/26/2019, 10/08/2019, 11/15/2020, 11/16/2020, 09/18/2022    Influenza, Unspecified 08/30/2018    Influenza, seasonal, injectable 11/07/2015, 10/22/2018, 11/22/2020    Pfizer Purple Cap SARS-CoV-2 03/12/2021, 04/03/2021, 10/13/2021    Pneumococcal conjugate vaccine, 13-valent (PREVNAR 13) 10/23/2018    Pneumococcal conjugate vaccine, 20-valent (PREVNAR 20) 02/27/2023    Zoster vaccine, recombinant, adult (SHINGRIX) 02/27/2023, 07/10/2023       SOCIAL HISTORY  Smoking: never  Illicit drug use: none   Drinking alcohol: none    OCCUPATIONAL/ENVIRONMENTAL HISTORY  Previously worked as: Retired form the [employer].   Exposure to asbestos, silica, beryllium or inhaled metals.  No exposure to birds or exotic animals.    FAMILY HISTORY  No family history of pulmonary disease.  No family history of cancer.  No family history of autoimmune disorders.    REVIEW OF SYSTEMS     REVIEW OF SYSTEMS  Review of Systems    Constitutional: No fever, no chills, no night sweats.    Eyes: No double vision, no floaters, no dry eyes.   ENT: See HPI.   Neck: No neck stiffness.  Cardiovascular: No sharp chest pain, no heart racing, no leg swelling.  Respiratory: as noted in HPI.   Integumentary: No rashes or sores.  Neurological: No dizziness, no headaches. Sleeping well.  Psychiatric: No mood changes.       PHYSICAL EXAM     VITAL SIGNS:   Vitals:    10/11/23 0852   BP: 120/70   Pulse: 64   Resp: 16   Temp: 36.4 °C (97.5 °F)   SpO2: 93%       CURRENT WEIGHT: Body mass index is 41.22 kg/m².    PREVIOUS WEIGHTS:  Wt Readings from Last 3 Encounters:   09/07/23 133 kg (293 lb)   06/14/23 131 kg (289 lb 9 oz)   06/09/23 131 kg (289 lb 12.8 oz)       Physical Exam    Constitutional: General appearance: Alert and oriented.  No acute distress. Well developed, well nourished.  Head and face: Symmetric  Pulmonary: Chest is normal. No increased work of breathing or signs of  respiratory distress. Clear to auscultation bilaterally - no crackles, wheezing, or rhonchi.   Cardiovascular: Heart rate and rhythm normal. Normal S1, S2 - no murmurs, gallops, or pericardial rub.   Abdomen: Soft, non tender, +BS  Extremities: No edema. No clubbing or cyanosis of the fingernails.    Neurologic: Moves all four extremities   MSK: Normal movements of extremities. Gait normal   Psychiatric: Intact judgement and insight.    RESULTS/DATA     Pulmonary Function Test Results     Pulmonary Functions Testing Results:    4/6/2022: FE1/FVC 0.60/ FEV1 1.37 (36%)/ FVC 2.28 (45%)/ TLC 5.79 (72%)/ RV 3.50 (127%) / DLCO 60%/ DL/%    Chest Radiograph     XR chest 1 view 08/19/2023    Narrative  Interpreted By:  ADALI ALEJANDRO MD  MRN: [id number]  Patient Name: RUSTY GALARZA    STUDY:  Chest dated  8/19/2023.    INDICATION:  SOB    COMPARISON:  Chest dated 05/29/2023.    ACCESSION NUMBER(S):  [id number]    ORDERING CLINICIAN:  SAMI BROWNE    TECHNIQUE:  AP upright portable radiograph of the chest.    FINDINGS:  Linear opacities are seen in the right lower lung zone similar to the  prior exam.  No pneumothorax or effusion is evident. The  cardiomediastinal silhouette is  not enlarged.Degenerative change is  seen of the spine and shoulders.    Impression  Right basilar scarring and/or atelectasis.      Chest CT Scan     CT angio chest for pulmonary embolism 08/19/2023    Narrative  Interpreted By:  RENARD FREEMAN DO  MRN: [id number]  Patient Name: RUSTY GALARZA    STUDY:  CT ANGIO CHEST FOR PE;  8/19/2023 9:31 pm    INDICATION:  sob    COMPARISON:  CT angiogram chest 05/29/2023. Chest x-ray 08/19/2023    ACCESSION NUMBER(S):  [id number]    ORDERING CLINICIAN:  JONEL CARR    TECHNIQUE:  Helical data acquisition of the chest was obtained following the  uneventful administration of  75 milliliter OMNIPAQUE 350intravenous  contrast material. Images were reformatted in axial, coronal, and  sagittal planes. MIP  images were created and reviewed.    FINDINGS:  POTENTIAL LIMITATIONS OF THE STUDY: Motion artifact.    HEART AND VESSELS:  No discrete filling defects within the main pulmonary artery or its  branches.    No thoracic aortic aneurysm or acute dissection.  The main pulmonary artery is dilated.  The heart is not enlarged.  Coronary artery calcifications are noted.  No evidence of pericardial effusion.    MEDIASTINUM AND GRETCHEN, LOWER NECK AND AXILLA:  The visualized thyroid gland is within normal limits.    No evidence of thoracic lymphadenopathy by CT criteria.    LUNGS AND AIRWAYS:  The trachea and central airways are patent.    The evaluation of the lungs is degraded by motion artifact. Mild  bilateral atelectasis/scarring. Redemonstration of trace right  pleural effusion. No focal consolidation. No pneumothorax.  Redemonstration of bilateral calcified pleural plaque.    UPPER ABDOMEN:  Calcific foci at the spleen are probably representing granulomas.  There is colonic diverticulosis    CHEST WALL AND OSSEOUS STRUCTURES:  There is mild bilateral gynecomastia.  A cardiac monitor device is noted at the soft tissues of the left  anterior chest wall.  Multilevel degenerative changes at the spine.    Impression  1.  No evidence of pulmonary emboli.  2.  Mild bilateral atelectasis/scarring. Trace right pleural  effusion. Bilateral calcified pleural plaque.  3.  Coronary artery calcifications. Dilated main pulmonary artery,  may be seen with pulmonary arterial hypertension.      Echocardiogram     Echocardiogram     [address] [address]  Tel [phone] Fax [phone]    TRANSTHORACIC ECHOCARDIOGRAM REPORT      Patient Name:     RUSTY CHINTAN GALARZA   Reading Physician:  [id number] Scott Askew MD, [facility]  Study Date:       5/31/2023          Referring           JAZMYN QUINTANILLA  Physician:  MRN/PID:          [id number]           PCP:  Accession/Order#: [id number]           Department          [facility] Echo  Location:           Lab  YOB: 1950          Fellow:  Gender:           M                  Nurse:  Admit Date:       5/29/2023          Sonographer:        Re [facility]  Admission Status: Inpatient -        Additional Staff:  Routine  Height:           190.00 cm          CC Report to:       [facility]  Weight:           129.00 kg          Study Type:         Echocardiogram  BSA:              2.54 m2  Blood Pressure: 109 /54 mmHg    Diagnosis/ICD: R06.00-Dyspnea, unspecified  Indication:    Dyspnea  Procedure/CPT: Echo Complete w Full [id number]    Patient History:  Diabetes:          Yes  Pertinent History: COPD, HTN, Hyperlipidemia, CAD and PCI x5.    Study Detail: The following Echo studies were performed: 2D, M-Mode, Doppler and  color flow. Technically challenging study due to poor acoustic  windows, prominent lung artifact and body habitus. A bubble study  was not performed.      PHYSICIAN INTERPRETATION:  Left Ventricle: Left ventricular systolic function is normal, with an estimated ejection fraction of 65-70%. There are no regional wall motion abnormalities. The left ventricular cavity size is normal. The left ventricular septal wall thickness is normal. There is normal left ventricular posterior wall thickness. Spectral Doppler shows an impaired relaxation pattern of left ventricular diastolic filling.  Left Atrium: The left atrium is mildly dilated. Left atrial volume index 32.1 mL/m2.  Right Ventricle: The right ventricle is normal in size. There is normal right ventricular global systolic function. Normal right ventricular chamber size and function.  Right Atrium: The right atrium is normal in size.  Aortic Valve: The aortic valve is trileaflet. There is minimal aortic valve cusp calcification. There is trace to mild aortic valve regurgitation. The peak instantaneous gradient of the aortic valve is 12.1 mmHg. The mean gradient of  the aortic valve is 6.0 mmHg. Trivial to 1+ aortic regurgitation.  Mitral Valve: The mitral valve is normal in structure. There is trace mitral valve regurgitation.  Tricuspid Valve: The tricuspid valve is structurally normal. There is trace to mild tricuspid regurgitation. Trivial to 1+ tricuspid regurgitation with estimated RVSP 47 mmHg consistent with moderately increased right sided pressures.  Pulmonic Valve: The pulmonic valve is structurally normal. There is no indication of pulmonic valve regurgitation.  Pericardium: There is no pericardial effusion noted. There is a pericardial fat pad present.  Aorta: The aortic root is normal.  Systemic Veins: There is IVC inspiratory collapse greater than 50%.      CONCLUSIONS:  1. Left ventricular systolic function is normal with a 65-70% estimated ejection fraction.  2. Spectral Doppler shows an impaired relaxation pattern of left ventricular diastolic filling.  3. Normal right ventricular chamber size and function.  4. Trivial to 1+ tricuspid regurgitation with estimated RVSP 47 mmHg consistent with moderately increased right sided pressures.  5. Trivial to 1+ aortic regurgitation.  6. Comparison study dated January 23, 2021 showed estimated LV ejection fraction 60 to 65% with trivial tricuspid regurgitation. RVSP was unable to be estimated.    QUANTITATIVE DATA SUMMARY:  2D MEASUREMENTS:  Normal Ranges:  Ao Root d:     3.30 cm   (2.0-3.7cm)  LAs:           4.30 cm   (2.7-4.0cm)  IVSd:          1.16 cm   (0.6-1.1cm)  LVPWd:         1.12 cm   (0.6-1.1cm)  LVIDd:         5.40 cm   (3.9-5.9cm)  LVIDs:         2.70 cm  LV Mass Index: 96.9 g/m2  LV % FS        50.0 %    LA VOLUME:  Normal Ranges:  LA Vol A4C:        74.8 ml    (22+/-6mL/m2)  LA Vol A2C:        81.6 ml  LA Vol BP:         84.4 ml  LA Vol Index A4C:  29.4ml/m2  LA Vol Index A2C:  32.1 ml/m2  LA Vol Index BP:   33.2 ml/m2  LA Area A4C:       21.8 cm2  LA Area A2C:       24.6 cm2  LA Major Axis A4C: 5.4 cm  LA  Major Axis A2C: 6.3 cm  LA Volume Index:   30.7 ml/m2  LA Vol A4C:        69.0 ml  LA Vol A2C:        79.0 ml    RA VOLUME BY A/L METHOD:  Normal Ranges:  RA Vol A4C:        27.2 ml    (8.3-19.5ml)  RA Vol Index A4C:  10.7 ml/m2  RA Area A4C:       12.9 cm2  RA Major Axis A4C: 5.2 cm    LV SYSTOLIC FUNCTION BY 2D PLANIMETRY (MOD):  Normal Ranges:  EF-A4C View: 75.2 % (>=55%)  EF-A2C View: 62.7 %  EF-Biplane:  68.5 %    LV DIASTOLIC FUNCTION:  Normal Ranges:  MV Peak E:      0.81 m/s   (0.7-1.2 m/s)  MV Peak A:      0.87 m/s   (0.42-0.7 m/s)  E/A Ratio:      0.93       (1.0-2.2)  MV e'           0.11 m/s   (>8.0)  MV lateral e'   0.11 m/s  MV medial e'    0.07 m/s  E/e' Ratio:     7.35       (<8.0)  PulmV Sys Morris:  86.20 cm/s  PulmV Russell Morris: 47.60 cm/s  PulmV S/D Morris:  1.80    MITRAL VALVE:  Normal Ranges:  MV DT: 238 msec (150-240msec)    AORTIC VALVE:  Normal Ranges:  AoV Vmax:                1.74 m/s  (<=1.7m/s)  AoV Peak P.1 mmHg (<20mmHg)  AoV Mean P.0 mmHg  (1.7-11.5mmHg)  LVOT Max Morris:            1.15 m/s  (<=1.1m/s)  AoV VTI:                 37.70 cm  (18-25cm)  LVOT VTI:                26.70 cm  LVOT Diameter:           2.00 cm   (1.8-2.4cm)  AoV Area, VTI:           2.22 cm2  (2.5-5.5cm2)  AoV Area,Vmax:           2.08 cm2  (2.5-4.5cm2)  AoV Dimensionless Index: 0.71    RIGHT VENTRICLE:  RV 1 3.9 cm  RV 2 3.3 cm  RV 3 7.2 cm    TRICUSPID VALVE/RVSP:  Normal Ranges:  Peak TR Velocity: 3.34 m/s  RV Syst Pressure: 47.6 mmHg (< 30mmHg)    PULMONIC VALVE:  Normal Ranges:  PV Accel Time: 98 msec  (>120ms)  PV Max Morris:    1.3 m/s  (0.6-0.9m/s)  PV Max P.2 mmHg  PV Mean P.0 mmHg  PV VTI:        30.90 cm    Pulmonary Veins:  PulmV Russell Morris: 47.60 cm/s  PulmV S/D Morris:  1.80  PulmV Sys Morris:  86.20 cm/s      [id number] Scott Askew MD, FACC  Electronically signed on 2023 at 12:58:23 PM      ASSESSMENT/PLAN     Mr. Mcfarland is a 73 y.o. male and  has a past medical  history of Diabetic retinopathy (CMS/[facility]) and Glaucoma.    Problem List and Orders  Diagnoses and all orders for this visit:  Chronic bronchitis, unspecified chronic bronchitis type (CMS/[facility])  -     fluticasone-umeclidin-vilanter (Trelegy Ellipta) 100-62.5-25 mcg blister with device; Inhale 1 puff once daily. RINSE  MOUTH AFTER EACH USE TO AVOID ORAL THRUSH.  Obstructive airway disease (CMS/[facility])  -     Aerosol Machine for home use - [employer]  Pneumonia due to infectious organism, unspecified laterality, unspecified part of lung  -     XR chest 2 views; Future    Assessment and Plan / Recommendations:  Problem List Items Addressed This Visit    None        COPD; (severe obstruction) vs. asthma with airway remodeling  - START Trelegy 1 puff once day  - Continue albuterol HFA inhaler or albuterol nebulizer every 4-6 hours as needed     *Bilateral calcified pleural plaque on 8/19/23 CT/PE *    Follow in 6 months or sooner if needed

## 2023-10-18 ENCOUNTER — PATIENT OUTREACH (OUTPATIENT)
Dept: CARE COORDINATION | Facility: CLINIC | Age: 73
End: 2023-10-18
Payer: MEDICARE

## 2023-10-18 NOTE — PROGRESS NOTES
Successful monthly outreach  Per 10/11 post hospital pulmonology note, patient reports shortness of breath on exertion, but not at rest; using nebulizer three times daily.  Follow up with PCP and pulmonology in December.   Baseline shortness of breath noted.    Patient states has noted significant improvement with Trilegy.     Pam OLGUIN RN CCM  RN Care Coordinator  [facility]  Phone [phone]

## 2023-11-03 ENCOUNTER — HOSPITAL ENCOUNTER (OUTPATIENT)
Dept: CARDIOLOGY | Facility: HOSPITAL | Age: 73
Discharge: HOME | End: 2023-11-03
Payer: MEDICARE

## 2023-11-03 DIAGNOSIS — R55 SYNCOPE AND COLLAPSE: ICD-10-CM

## 2023-11-03 DIAGNOSIS — Z95.818 PRESENCE OF CARDIAC DEVICE: Primary | ICD-10-CM

## 2023-11-03 DIAGNOSIS — Z95.818 PRESENCE OF CARDIAC DEVICE: ICD-10-CM

## 2023-11-03 PROCEDURE — 93298 REM INTERROG DEV EVAL SCRMS: CPT | Performed by: INTERNAL MEDICINE

## 2023-11-15 ENCOUNTER — PATIENT OUTREACH (OUTPATIENT)
Dept: CARE COORDINATION | Facility: CLINIC | Age: 73
End: 2023-11-15
Payer: MEDICARE

## 2023-11-15 NOTE — PROGRESS NOTES
Monthly outreach to patient.   Patient states Dr Espinoza is retiring and will change to Dr Asher..  Baseline shortness of breath noted.  Patient states has productive thick yellow sputum, using nebulizer 3-4 times per day.   This writer educates patient to contact PCP to inform of such.  Patient agrees to do such.  Appts with PCP 12/7 and with pulmonology 12/14.    Pam OLGUIN RN CCM  RN Care Coordinator  [facility]  Phone [phone]

## 2023-11-17 ENCOUNTER — TELEPHONE (OUTPATIENT)
Dept: PRIMARY CARE | Facility: CLINIC | Age: 73
End: 2023-11-17
Payer: MEDICARE

## 2023-11-17 DIAGNOSIS — J44.1 CHRONIC OBSTRUCTIVE PULMONARY DISEASE WITH ACUTE EXACERBATION (MULTI): Primary | ICD-10-CM

## 2023-11-17 RX ORDER — LEVOFLOXACIN 500 MG/1
500 TABLET, FILM COATED ORAL DAILY
Qty: 6 TABLET | Refills: 0 | Status: SHIPPED | OUTPATIENT
Start: 2023-11-17 | End: 2023-12-07 | Stop reason: WASHOUT

## 2023-11-17 NOTE — TELEPHONE ENCOUNTER
HAS HEAD AND CHEST CONGESTION WITH YELLOW MUCUS, NO FEVER, NO OTHER SX FOR ABOUT 5 DAYS. CAN YOU CALL SOMETHING IN?

## 2023-12-04 ENCOUNTER — LAB (OUTPATIENT)
Dept: LAB | Facility: LAB | Age: 73
End: 2023-12-04
Payer: MEDICARE

## 2023-12-04 DIAGNOSIS — E11.59 TYPE 2 DIABETES MELLITUS WITH OTHER CIRCULATORY COMPLICATION, WITH LONG-TERM CURRENT USE OF INSULIN (MULTI): ICD-10-CM

## 2023-12-04 DIAGNOSIS — Z79.4 TYPE 2 DIABETES MELLITUS WITH OTHER CIRCULATORY COMPLICATION, WITH LONG-TERM CURRENT USE OF INSULIN (MULTI): ICD-10-CM

## 2023-12-04 DIAGNOSIS — E11.51 TYPE 2 DIABETES MELLITUS WITH DIABETIC PERIPHERAL ANGIOPATHY WITHOUT GANGRENE, WITH LONG-TERM CURRENT USE OF INSULIN (MULTI): ICD-10-CM

## 2023-12-04 DIAGNOSIS — I25.10 CORONARY ARTERY DISEASE INVOLVING NATIVE CORONARY ARTERY OF NATIVE HEART WITHOUT ANGINA PECTORIS: ICD-10-CM

## 2023-12-04 DIAGNOSIS — Z79.4 TYPE 2 DIABETES MELLITUS WITH DIABETIC PERIPHERAL ANGIOPATHY WITHOUT GANGRENE, WITH LONG-TERM CURRENT USE OF INSULIN (MULTI): ICD-10-CM

## 2023-12-04 LAB
ALBUMIN SERPL BCP-MCNC: 3.9 G/DL (ref 3.4–5)
ALP SERPL-CCNC: 106 U/L (ref 33–136)
ALT SERPL W P-5'-P-CCNC: 18 U/L (ref 10–52)
ANION GAP SERPL CALC-SCNC: 15 MMOL/L (ref 10–20)
AST SERPL W P-5'-P-CCNC: 13 U/L (ref 9–39)
BILIRUB SERPL-MCNC: 1 MG/DL (ref 0–1.2)
BUN SERPL-MCNC: 36 MG/DL (ref 6–23)
CALCIUM SERPL-MCNC: 9.1 MG/DL (ref 8.6–10.3)
CHLORIDE SERPL-SCNC: 103 MMOL/L (ref 98–107)
CHOLEST SERPL-MCNC: 88 MG/DL (ref 0–199)
CHOLESTEROL/HDL RATIO: 3.1
CO2 SERPL-SCNC: 27 MMOL/L (ref 21–32)
CREAT SERPL-MCNC: 1.29 MG/DL (ref 0.5–1.3)
EST. AVERAGE GLUCOSE BLD GHB EST-MCNC: 194 MG/DL
GFR SERPL CREATININE-BSD FRML MDRD: 59 ML/MIN/1.73M*2
GLUCOSE SERPL-MCNC: 138 MG/DL (ref 74–99)
HBA1C MFR BLD: 8.4 %
HDLC SERPL-MCNC: 28.4 MG/DL
LDLC SERPL CALC-MCNC: 42 MG/DL
NON HDL CHOLESTEROL: 60 MG/DL (ref 0–149)
POTASSIUM SERPL-SCNC: 4.3 MMOL/L (ref 3.5–5.3)
PROT SERPL-MCNC: 6.4 G/DL (ref 6.4–8.2)
SODIUM SERPL-SCNC: 141 MMOL/L (ref 136–145)
TRIGL SERPL-MCNC: 87 MG/DL (ref 0–149)
VLDL: 17 MG/DL (ref 0–40)

## 2023-12-04 PROCEDURE — 80053 COMPREHEN METABOLIC PANEL: CPT

## 2023-12-04 PROCEDURE — 80061 LIPID PANEL: CPT

## 2023-12-04 PROCEDURE — 36415 COLL VENOUS BLD VENIPUNCTURE: CPT

## 2023-12-04 PROCEDURE — 83036 HEMOGLOBIN GLYCOSYLATED A1C: CPT

## 2023-12-07 ENCOUNTER — OFFICE VISIT (OUTPATIENT)
Dept: PRIMARY CARE | Facility: CLINIC | Age: 73
End: 2023-12-07
Payer: MEDICARE

## 2023-12-07 VITALS
HEART RATE: 65 BPM | SYSTOLIC BLOOD PRESSURE: 120 MMHG | BODY MASS INDEX: 42.14 KG/M2 | WEIGHT: 301 LBS | TEMPERATURE: 97.3 F | DIASTOLIC BLOOD PRESSURE: 78 MMHG | HEIGHT: 71 IN

## 2023-12-07 DIAGNOSIS — Z79.4 TYPE 2 DIABETES MELLITUS WITH DIABETIC PERIPHERAL ANGIOPATHY WITHOUT GANGRENE, WITH LONG-TERM CURRENT USE OF INSULIN (MULTI): ICD-10-CM

## 2023-12-07 DIAGNOSIS — I10 ESSENTIAL HYPERTENSION, BENIGN: ICD-10-CM

## 2023-12-07 DIAGNOSIS — Z12.5 PROSTATE CANCER SCREENING: ICD-10-CM

## 2023-12-07 DIAGNOSIS — I25.118 ATHEROSCLEROTIC HEART DISEASE OF NATIVE CORONARY ARTERY WITH OTHER FORMS OF ANGINA PECTORIS (CMS-HCC): ICD-10-CM

## 2023-12-07 DIAGNOSIS — Z98.61 CAD S/P PERCUTANEOUS CORONARY ANGIOPLASTY: ICD-10-CM

## 2023-12-07 DIAGNOSIS — E11.51 TYPE 2 DIABETES MELLITUS WITH DIABETIC PERIPHERAL ANGIOPATHY WITHOUT GANGRENE, WITH LONG-TERM CURRENT USE OF INSULIN (MULTI): ICD-10-CM

## 2023-12-07 DIAGNOSIS — J44.9 CHRONIC OBSTRUCTIVE PULMONARY DISEASE, UNSPECIFIED COPD TYPE (MULTI): ICD-10-CM

## 2023-12-07 DIAGNOSIS — I25.10 CAD S/P PERCUTANEOUS CORONARY ANGIOPLASTY: ICD-10-CM

## 2023-12-07 DIAGNOSIS — I65.23 BILATERAL CAROTID ARTERY STENOSIS: Primary | ICD-10-CM

## 2023-12-07 PROCEDURE — 3008F BODY MASS INDEX DOCD: CPT | Performed by: INTERNAL MEDICINE

## 2023-12-07 PROCEDURE — 3078F DIAST BP <80 MM HG: CPT | Performed by: INTERNAL MEDICINE

## 2023-12-07 PROCEDURE — 4010F ACE/ARB THERAPY RXD/TAKEN: CPT | Performed by: INTERNAL MEDICINE

## 2023-12-07 PROCEDURE — 3074F SYST BP LT 130 MM HG: CPT | Performed by: INTERNAL MEDICINE

## 2023-12-07 PROCEDURE — 3066F NEPHROPATHY DOC TX: CPT | Performed by: INTERNAL MEDICINE

## 2023-12-07 PROCEDURE — 1159F MED LIST DOCD IN RCRD: CPT | Performed by: INTERNAL MEDICINE

## 2023-12-07 PROCEDURE — 1160F RVW MEDS BY RX/DR IN RCRD: CPT | Performed by: INTERNAL MEDICINE

## 2023-12-07 PROCEDURE — 1036F TOBACCO NON-USER: CPT | Performed by: INTERNAL MEDICINE

## 2023-12-07 PROCEDURE — 3048F LDL-C <100 MG/DL: CPT | Performed by: INTERNAL MEDICINE

## 2023-12-07 PROCEDURE — 3052F HG A1C>EQUAL 8.0%<EQUAL 9.0%: CPT | Performed by: INTERNAL MEDICINE

## 2023-12-07 PROCEDURE — 99213 OFFICE O/P EST LOW 20 MIN: CPT | Performed by: INTERNAL MEDICINE

## 2023-12-07 ASSESSMENT — ENCOUNTER SYMPTOMS
SHORTNESS OF BREATH: 1
COUGH: 1
DIZZINESS: 0
ARTHRALGIAS: 1
DIFFICULTY URINATING: 0
GASTROINTESTINAL NEGATIVE: 1
CONSTITUTIONAL NEGATIVE: 1
CHOKING: 0

## 2023-12-07 NOTE — PROGRESS NOTES
Subjective   Patient ID: Haile Mcfarland is a 73 y.o. male who presents for Follow-up (3 mo fu).    HPI   Heart Problem  This is a chronic problem. The current episode started more than 1 year ago. The problem occurs intermittently. The problem has been resolved. Associated symptoms include a visual change. Pertinent negatives include no abdominal pain, chills, coughing, diaphoresis or fatigue. The treatment provided mild relief.   Diabetes  He presents for his follow-up diabetic visit. He has type 2 diabetes mellitus. No MedicAlert identification noted. His disease course has been stable. Associated symptoms include blurred vision, foot paresthesias and visual change. Pertinent negatives for diabetes include no fatigue. Symptoms are stable. Diabetic complications include heart disease and peripheral neuropathy. Risk factors for coronary artery disease include dyslipidemia, hypertension and obesity. Current diabetic treatment includes insulin injections and oral agent (monotherapy). He is compliant with treatment all of the time. There is no change in his home blood glucose trend.   Shortness of Breath  This is a chronic problem. The current episode started 1 to 4 weeks ago. The problem occurs every several days. The problem has been unchanged. Pertinent negatives include no abdominal pain or leg swelling. The treatment provided mild relief. His past medical history is significant for CAD and COPD. There is no history of a heart failure, PE or pneumonia.   COPD: Patient complains of dyspnea. Symptoms began several years ago. Symptoms chronic dyspnea does worsen with exertion. Sputum is clear in small amounts. Fever has been  no fever . Patient uses 1 pillows at night. Patient can walk 100 feet before resting. Patient currently is not on home oxygen therapy.. Respiratory history: chronic bronchitis, COPD, and emphysema  Review of Systems   Constitutional: Negative.    HENT:  Negative for congestion.    Eyes:   "Positive for visual disturbance.   Respiratory:  Positive for cough and shortness of breath. Negative for choking.    Cardiovascular:  Positive for leg swelling.   Gastrointestinal: Negative.    Genitourinary:  Negative for difficulty urinating.   Musculoskeletal:  Positive for arthralgias.   Skin: Negative.    Neurological:  Negative for dizziness.       Objective   /78 (BP Location: Right arm, Patient Position: Sitting, BP Cuff Size: Adult)   Pulse 65   Temp 36.3 °C (97.3 °F) (Temporal)   Ht 1.797 m (5' 10.75\")   Wt 137 kg (301 lb)   BMI 42.28 kg/m²     Physical Exam  Vitals reviewed.   Constitutional:       Appearance: Normal appearance. He is obese.   HENT:      Head: Normocephalic.   Eyes:      Conjunctiva/sclera: Conjunctivae normal.   Cardiovascular:      Rate and Rhythm: Normal rate and regular rhythm.      Pulses: Normal pulses.   Pulmonary:      Effort: Pulmonary effort is normal.      Breath sounds: Normal breath sounds.   Abdominal:      Palpations: Abdomen is soft.   Musculoskeletal:         General: Tenderness present.      Cervical back: Neck supple.   Skin:     General: Skin is warm and dry.   Neurological:      General: No focal deficit present.   Psychiatric:         Mood and Affect: Mood normal.       Assessment/Plan   Problem List Items Addressed This Visit             ICD-10-CM    Bilateral carotid artery stenosis - Primary I65.23    Atherosclerotic heart disease of native coronary artery with other forms of angina pectoris (CMS/[facility]) I25.118    Chronic obstructive pulmonary disease (CMS/[facility]) J44.9    Essential hypertension, benign I10    Type 2 diabetes mellitus with diabetic peripheral angiopathy without gangrene, with long-term current use of insulin (CMS/[facility]) E11.51, Z79.4    CAD S/P percutaneous coronary angioplasty I25.10, Z98.61   Pt has moderate to severe COPD. It is progressive disease, use of MDI and proper technique discussed, rinse mouth after inhaled corticosteroids. " Notify if progressive dyspnea or cough or lethargy, monitor oxygen saturation if possible with home based pulse oximetry. Avoid hospitalization and call us if any flare ups are about to happen, be sure that annual flu vaccine are uptodate and review need for pneumococcal vaccine. Light to moderate low impact exercises are very helpful and deep breathing  exercises are beneficial in chronic lung diseases. Pt does not have any angina lately, aware of using NTG if needed, aware to notify MD if any new chest pains happens. Compliance is appropriate. Statin therapy reviewed, fortunately no hospitalization this time, weight is unchanged, Cologuard has not been done, he was given Cologuard kit in the beginning of the year, I insisted that he will proceed with the Cologuard kit submission.  Colon cancer screening is very important, no angina, BP readings are stable, no evidence of any chest pain or any ER visit related to chest pain, moderate to severe COPD is present, never been able to get his blood sugar in excellent control, he has a diabetes related atherosclerotic vascular disease and diabetes related ophthalmic complications.  Current medications are reviewed, he is continue to be followed by pulmonary,.  Hemoglobin A1c assay will be done, please do Clarisa, follow-up in 3 months.

## 2023-12-08 ENCOUNTER — HOSPITAL ENCOUNTER (OUTPATIENT)
Dept: CARDIOLOGY | Facility: HOSPITAL | Age: 73
Discharge: HOME | End: 2023-12-08
Payer: MEDICARE

## 2023-12-08 DIAGNOSIS — Z95.818 PRESENCE OF CARDIAC DEVICE: ICD-10-CM

## 2023-12-08 DIAGNOSIS — R55 SYNCOPE AND COLLAPSE: ICD-10-CM

## 2023-12-08 PROCEDURE — G2066 INTER DEVC REMOTE 30D: HCPCS

## 2023-12-08 PROCEDURE — 93298 REM INTERROG DEV EVAL SCRMS: CPT | Performed by: INTERNAL MEDICINE

## 2023-12-15 ENCOUNTER — PATIENT OUTREACH (OUTPATIENT)
Dept: CARE COORDINATION | Facility: CLINIC | Age: 73
End: 2023-12-15
Payer: MEDICARE

## 2023-12-15 NOTE — PROGRESS NOTES
Outreach to patient   Per chart, pt contacted PCP who called in prescription and followed up with PCP 12/7.   Patient reports had mechanical fall onto knee Thanksgiving day and still is sore, but able to weight bear.    Baseline shortness of breath noted, no further cough.     Pam OLGUIN RN CCM  RN Care Coordinator  [facility]  Phone [phone]

## 2023-12-18 ENCOUNTER — APPOINTMENT (OUTPATIENT)
Dept: OPHTHALMOLOGY | Facility: CLINIC | Age: 73
End: 2023-12-18
Payer: MEDICARE

## 2023-12-20 ENCOUNTER — TELEPHONE (OUTPATIENT)
Dept: PULMONOLOGY | Facility: CLINIC | Age: 73
End: 2023-12-20
Payer: MEDICARE

## 2023-12-20 DIAGNOSIS — J44.9 OBSTRUCTIVE AIRWAY DISEASE (MULTI): Primary | ICD-10-CM

## 2023-12-21 RX ORDER — FLUTICASONE FUROATE, UMECLIDINIUM BROMIDE AND VILANTEROL TRIFENATATE 100; 62.5; 25 UG/1; UG/1; UG/1
1 POWDER RESPIRATORY (INHALATION) DAILY
Qty: 1 EACH | Refills: 6 | Status: SHIPPED | OUTPATIENT
Start: 2023-12-21 | End: 2024-04-11 | Stop reason: SDUPTHER

## 2024-01-05 ENCOUNTER — HOSPITAL ENCOUNTER (OUTPATIENT)
Dept: CARDIOLOGY | Facility: HOSPITAL | Age: 74
Discharge: HOME | End: 2024-01-05
Payer: MEDICARE

## 2024-01-05 DIAGNOSIS — R55 SYNCOPE AND COLLAPSE: ICD-10-CM

## 2024-01-05 DIAGNOSIS — Z95.818 PRESENCE OF CARDIAC DEVICE: ICD-10-CM

## 2024-01-10 ENCOUNTER — PATIENT OUTREACH (OUTPATIENT)
Dept: CARE COORDINATION | Facility: CLINIC | Age: 74
End: 2024-01-10

## 2024-01-10 ENCOUNTER — OFFICE VISIT (OUTPATIENT)
Dept: OPHTHALMOLOGY | Facility: CLINIC | Age: 74
End: 2024-01-10
Payer: MEDICARE

## 2024-01-10 DIAGNOSIS — E11.39 NEOVASCULAR GLAUCOMA DUE TO TYPE 2 DIABETES MELLITUS (MULTI): Primary | ICD-10-CM

## 2024-01-10 DIAGNOSIS — H42 NEOVASCULAR GLAUCOMA DUE TO TYPE 2 DIABETES MELLITUS (MULTI): Primary | ICD-10-CM

## 2024-01-10 PROCEDURE — 92134 CPTRZ OPH DX IMG PST SGM RTA: CPT | Mod: BILATERAL PROCEDURE | Performed by: OPHTHALMOLOGY

## 2024-01-10 PROCEDURE — 99213 OFFICE O/P EST LOW 20 MIN: CPT | Performed by: OPHTHALMOLOGY

## 2024-01-10 ASSESSMENT — ENCOUNTER SYMPTOMS
RESPIRATORY NEGATIVE: 0
GASTROINTESTINAL NEGATIVE: 0
CONSTITUTIONAL NEGATIVE: 0
PSYCHIATRIC NEGATIVE: 0
CARDIOVASCULAR NEGATIVE: 0
ENDOCRINE NEGATIVE: 0
ALLERGIC/IMMUNOLOGIC NEGATIVE: 0
NEUROLOGICAL NEGATIVE: 0
HEMATOLOGIC/LYMPHATIC NEGATIVE: 0
MUSCULOSKELETAL NEGATIVE: 0
EYES NEGATIVE: 1

## 2024-01-10 ASSESSMENT — VISUAL ACUITY
METHOD: SNELLEN - LINEAR
CORRECTION_TYPE: GLASSES
OS_CC: 20/25

## 2024-01-10 ASSESSMENT — PACHYMETRY
OS_CT(UM): 656
OD_CT(UM): 700

## 2024-01-10 ASSESSMENT — TONOMETRY
IOP_METHOD: GOLDMANN APPLANATION
OS_IOP_MMHG: 16
OD_IOP_MMHG: 11

## 2024-01-10 NOTE — PROGRESS NOTES
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 Following CPC diode laser Kalarn/Mona, IOP today 2.  - Retina attached all 4 quadrants, with good PRP laser, shallow choroidals peripherally s/p CPC diode laser  - Stop alphagan and cosopt today in setting of hypotony  - Continue atropine BID and prednisolone QID   left eye DOES NOT HAVE PDR      #VMT with cystoid macular edema (CME) OS  VMT is stable, patient' s vision is 20/25 and he is denying metamorphosia  R/b of pars plana vitrectomy (PPV) os were discussed, will defer surgery for now.         Left eye  has a PCO that is interfering w retina care       today there is an element of VMT OS    consider PPV also OS if warranted  since VA OS is good    4m f/u

## 2024-01-10 NOTE — PROGRESS NOTES
Monthly outreach; ;left voice mail message.   Average fasting blood sugar 130's  Patient has noted improvement with Trelegy.  Patient states had ophhtlmoogy appt today, surgery was offered, but he has deferred such.   Pam OLGUIN RN CCM  RN Care Coordinator  [facility]  Phone [phone]

## 2024-01-12 ENCOUNTER — HOSPITAL ENCOUNTER (OUTPATIENT)
Dept: CARDIOLOGY | Facility: HOSPITAL | Age: 74
Discharge: HOME | End: 2024-01-12
Payer: MEDICARE

## 2024-01-12 DIAGNOSIS — Z95.818 PRESENCE OF CARDIAC DEVICE: ICD-10-CM

## 2024-01-12 DIAGNOSIS — R55 SYNCOPE AND COLLAPSE: ICD-10-CM

## 2024-01-12 PROCEDURE — 93297 REM INTERROG DEV EVAL ICPMS: CPT

## 2024-01-12 PROCEDURE — 93298 REM INTERROG DEV EVAL SCRMS: CPT | Performed by: INTERNAL MEDICINE

## 2024-01-19 ENCOUNTER — HOSPITAL ENCOUNTER (OUTPATIENT)
Dept: CARDIOLOGY | Facility: HOSPITAL | Age: 74
Discharge: HOME | End: 2024-01-19
Payer: MEDICARE

## 2024-01-19 DIAGNOSIS — R55 SYNCOPE AND COLLAPSE: ICD-10-CM

## 2024-01-19 DIAGNOSIS — Z95.818 PRESENCE OF CARDIAC DEVICE: ICD-10-CM

## 2024-01-29 DIAGNOSIS — E78.2 MIXED HYPERLIPIDEMIA: ICD-10-CM

## 2024-01-29 RX ORDER — ATORVASTATIN CALCIUM 80 MG/1
80 TABLET, FILM COATED ORAL NIGHTLY
Qty: 90 TABLET | Refills: 3 | Status: SHIPPED | OUTPATIENT
Start: 2024-01-29

## 2024-02-09 ENCOUNTER — OFFICE VISIT (OUTPATIENT)
Dept: OPHTHALMOLOGY | Facility: CLINIC | Age: 74
End: 2024-02-09
Payer: MEDICARE

## 2024-02-09 ENCOUNTER — PATIENT OUTREACH (OUTPATIENT)
Dept: CARE COORDINATION | Facility: CLINIC | Age: 74
End: 2024-02-09

## 2024-02-09 DIAGNOSIS — Z96.1 PSEUDOPHAKIA: ICD-10-CM

## 2024-02-09 DIAGNOSIS — E11.39 NEOVASCULAR GLAUCOMA DUE TO TYPE 2 DIABETES MELLITUS (MULTI): Primary | ICD-10-CM

## 2024-02-09 DIAGNOSIS — H42 NEOVASCULAR GLAUCOMA DUE TO TYPE 2 DIABETES MELLITUS (MULTI): Primary | ICD-10-CM

## 2024-02-09 PROCEDURE — 99213 OFFICE O/P EST LOW 20 MIN: CPT | Performed by: OPHTHALMOLOGY

## 2024-02-09 RX ORDER — BIMATOPROST 0.1 MG/ML
1 SOLUTION/ DROPS OPHTHALMIC NIGHTLY
Qty: 2.5 ML | Refills: 11 | Status: SHIPPED | OUTPATIENT
Start: 2024-02-09 | End: 2024-02-13

## 2024-02-09 RX ORDER — BRINZOLAMIDE/BRIMONIDINE TARTRATE 10; 2 MG/ML; MG/ML
1 SUSPENSION/ DROPS OPHTHALMIC 2 TIMES DAILY
Qty: 10 ML | Refills: 11 | Status: SHIPPED | OUTPATIENT
Start: 2024-02-09 | End: 2025-02-08

## 2024-02-09 RX ORDER — PREDNISOLONE ACETATE 10 MG/ML
1 SUSPENSION/ DROPS OPHTHALMIC 2 TIMES DAILY
Qty: 10 ML | Refills: 11 | Status: SHIPPED | OUTPATIENT
Start: 2024-02-09 | End: 2025-02-08

## 2024-02-09 ASSESSMENT — TONOMETRY
IOP_METHOD: GOLDMANN APPLANATION
OD_IOP_MMHG: 14
OS_IOP_MMHG: 16

## 2024-02-09 ASSESSMENT — VISUAL ACUITY
OS_CC: 20/25-2
METHOD: SNELLEN - LINEAR

## 2024-02-09 ASSESSMENT — PACHYMETRY
OS_CT(UM): 656
OD_CT(UM): 700

## 2024-02-09 ASSESSMENT — SLIT LAMP EXAM - LIDS
COMMENTS: NORMAL
COMMENTS: NORMAL

## 2024-02-09 ASSESSMENT — CUP TO DISC RATIO
OD_RATIO: 0.5
OS_RATIO: 0.3

## 2024-02-09 ASSESSMENT — EXTERNAL EXAM - LEFT EYE: OS_EXAM: NORMAL

## 2024-02-09 ASSESSMENT — EXTERNAL EXAM - RIGHT EYE: OD_EXAM: NORMAL

## 2024-02-09 ASSESSMENT — ENCOUNTER SYMPTOMS: EYES NEGATIVE: 1

## 2024-02-09 NOTE — PROGRESS NOTES
Patient returns call; denies cough or shortness of breath.  Patient states was at eye appt today with improvement in vision.    Pam OLGUIN RN CCM  RN Care Coordinator  [facility]  Phone [phone]

## 2024-02-09 NOTE — PROGRESS NOTES
Visual Acuity (Snellen - Linear)         Right Left    Dist cc CF at  2' 20/25-2          Tonometry       Tonometry (Goldmann Applanation, 10:04 AM)         Right Left    Pressure 14 16                  Assessment/Plan   Last dilated:  6/2/23    1.  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  IOP had been controlled, but recently had been elevating.  On gonio, there is no active NVA, but the angle is 90% scarred with PAS.  Given that IOP is only 30 mmHg on no meds, there is probably acqueous hyposecretion.  Will try medications first.  Pt with h/o quad bypass, so will avoid beta-blocker       On simbrinza and lumigan OD -> IOP much better.  IOP remains well controlled and no need for surgery     Plan:  cont simbrinza OD BID               cont lumigan OD QHS               f/u 4 months (HVF, dilation, RNFL)    2.  Pseudophakia (PCIOL) OU:  minimal PCO OD, s/p YAG Cap OS 5/15/23 with dramatic improvement of VA      Plan:  monitor    3.  Proliferative Diabetic Retinopathy OU:  s/p PRP OU.  +CSME OS       Plan:  cont prednisolone and ketorolac OS QID

## 2024-02-09 NOTE — PROGRESS NOTES
Reviewed current podiatry and ophthalmology notes. Appt with PCP next month.   Attempted monthly outreach; left voice mail message.     Pam OLGUIN RN CCM  RN Care Coordinator  [facility]  Phone [phone]

## 2024-02-12 DIAGNOSIS — E11.39 NEOVASCULAR GLAUCOMA DUE TO TYPE 2 DIABETES MELLITUS (MULTI): ICD-10-CM

## 2024-02-12 DIAGNOSIS — H42 NEOVASCULAR GLAUCOMA DUE TO TYPE 2 DIABETES MELLITUS (MULTI): ICD-10-CM

## 2024-02-13 RX ORDER — BIMATOPROST 0.1 MG/ML
SOLUTION/ DROPS OPHTHALMIC
Qty: 3 ML | Refills: 0 | Status: SHIPPED | OUTPATIENT
Start: 2024-02-13

## 2024-02-16 ENCOUNTER — HOSPITAL ENCOUNTER (OUTPATIENT)
Dept: CARDIOLOGY | Facility: HOSPITAL | Age: 74
Discharge: HOME | End: 2024-02-16
Payer: MEDICARE

## 2024-02-16 DIAGNOSIS — Z95.818 PRESENCE OF CARDIAC DEVICE: ICD-10-CM

## 2024-02-16 DIAGNOSIS — R55 SYNCOPE AND COLLAPSE: ICD-10-CM

## 2024-02-16 PROCEDURE — 93298 REM INTERROG DEV EVAL SCRMS: CPT | Performed by: INTERNAL MEDICINE

## 2024-02-16 PROCEDURE — 93298 REM INTERROG DEV EVAL SCRMS: CPT

## 2024-02-17 DIAGNOSIS — E11.59 TYPE 2 DIABETES MELLITUS WITH OTHER CIRCULATORY COMPLICATION, WITH LONG-TERM CURRENT USE OF INSULIN (MULTI): ICD-10-CM

## 2024-02-17 DIAGNOSIS — Z79.4 TYPE 2 DIABETES MELLITUS WITH OTHER CIRCULATORY COMPLICATION, WITH LONG-TERM CURRENT USE OF INSULIN (MULTI): ICD-10-CM

## 2024-02-17 RX ORDER — INSULIN GLARGINE 300 U/ML
INJECTION, SOLUTION SUBCUTANEOUS
Qty: 36 ML | Refills: 0 | Status: SHIPPED | OUTPATIENT
Start: 2024-02-17 | End: 2024-02-19

## 2024-02-19 DIAGNOSIS — Z79.4 TYPE 2 DIABETES MELLITUS WITH OTHER CIRCULATORY COMPLICATION, WITH LONG-TERM CURRENT USE OF INSULIN (MULTI): ICD-10-CM

## 2024-02-19 DIAGNOSIS — E11.59 TYPE 2 DIABETES MELLITUS WITH OTHER CIRCULATORY COMPLICATION, WITH LONG-TERM CURRENT USE OF INSULIN (MULTI): ICD-10-CM

## 2024-02-19 RX ORDER — INSULIN GLARGINE 300 U/ML
INJECTION, SOLUTION SUBCUTANEOUS
Qty: 36 ML | Refills: 0 | Status: SHIPPED | OUTPATIENT
Start: 2024-02-19

## 2024-02-27 ENCOUNTER — TELEPHONE (OUTPATIENT)
Dept: PRIMARY CARE | Facility: CLINIC | Age: 74
End: 2024-02-27
Payer: MEDICARE

## 2024-02-27 DIAGNOSIS — J44.0 CHRONIC OBSTRUCTIVE PULMONARY DISEASE WITH ACUTE LOWER RESPIRATORY INFECTION (MULTI): Primary | ICD-10-CM

## 2024-02-27 RX ORDER — LEVOFLOXACIN 500 MG/1
500 TABLET, FILM COATED ORAL DAILY
Qty: 5 TABLET | Refills: 0 | Status: SHIPPED | OUTPATIENT
Start: 2024-02-27 | End: 2024-03-08 | Stop reason: ALTCHOICE

## 2024-02-27 NOTE — TELEPHONE ENCOUNTER
Has his yearly cough, congestion (phlegm), scared of pneumonia says you usually give him an antibiotic/steroids, no fever

## 2024-03-01 ENCOUNTER — LAB (OUTPATIENT)
Dept: LAB | Facility: LAB | Age: 74
End: 2024-03-01
Payer: MEDICARE

## 2024-03-01 DIAGNOSIS — R35.89 POLYURIA: ICD-10-CM

## 2024-03-01 DIAGNOSIS — Z79.4 TYPE 2 DIABETES MELLITUS WITH DIABETIC PERIPHERAL ANGIOPATHY WITHOUT GANGRENE, WITH LONG-TERM CURRENT USE OF INSULIN (MULTI): ICD-10-CM

## 2024-03-01 DIAGNOSIS — E11.51 TYPE 2 DIABETES MELLITUS WITH DIABETIC PERIPHERAL ANGIOPATHY WITHOUT GANGRENE, WITH LONG-TERM CURRENT USE OF INSULIN (MULTI): ICD-10-CM

## 2024-03-01 DIAGNOSIS — Z12.5 PROSTATE CANCER SCREENING: ICD-10-CM

## 2024-03-01 LAB
EST. AVERAGE GLUCOSE BLD GHB EST-MCNC: 189 MG/DL
HBA1C MFR BLD: 8.2 %
PSA SERPL-MCNC: 0.52 NG/ML

## 2024-03-01 PROCEDURE — 36415 COLL VENOUS BLD VENIPUNCTURE: CPT

## 2024-03-01 PROCEDURE — 83036 HEMOGLOBIN GLYCOSYLATED A1C: CPT

## 2024-03-01 PROCEDURE — G0103 PSA SCREENING: HCPCS

## 2024-03-02 LAB
APPEARANCE UR: CLEAR
BILIRUB UR STRIP.AUTO-MCNC: NEGATIVE MG/DL
COLOR UR: YELLOW
GLUCOSE UR STRIP.AUTO-MCNC: NEGATIVE MG/DL
KETONES UR STRIP.AUTO-MCNC: NEGATIVE MG/DL
LEUKOCYTE ESTERASE UR QL STRIP.AUTO: NEGATIVE
NITRITE UR QL STRIP.AUTO: NEGATIVE
PH UR STRIP.AUTO: 5 [PH]
PROT UR STRIP.AUTO-MCNC: NEGATIVE MG/DL
RBC # UR STRIP.AUTO: NEGATIVE /UL
SP GR UR STRIP.AUTO: 1.02
UROBILINOGEN UR STRIP.AUTO-MCNC: <2 MG/DL

## 2024-03-02 PROCEDURE — 87086 URINE CULTURE/COLONY COUNT: CPT | Mod: ELYLAB | Performed by: INTERNAL MEDICINE

## 2024-03-02 PROCEDURE — 81003 URINALYSIS AUTO W/O SCOPE: CPT | Performed by: INTERNAL MEDICINE

## 2024-03-03 LAB — BACTERIA UR CULT: NORMAL

## 2024-03-04 DIAGNOSIS — I10 ESSENTIAL HYPERTENSION, BENIGN: ICD-10-CM

## 2024-03-04 RX ORDER — CARVEDILOL 12.5 MG/1
12.5 TABLET ORAL
Qty: 180 TABLET | Refills: 0 | Status: SHIPPED | OUTPATIENT
Start: 2024-03-04 | End: 2024-06-02

## 2024-03-08 ENCOUNTER — PATIENT OUTREACH (OUTPATIENT)
Dept: CARE COORDINATION | Facility: CLINIC | Age: 74
End: 2024-03-08

## 2024-03-08 ENCOUNTER — OFFICE VISIT (OUTPATIENT)
Dept: PRIMARY CARE | Facility: CLINIC | Age: 74
End: 2024-03-08
Payer: MEDICARE

## 2024-03-08 VITALS
DIASTOLIC BLOOD PRESSURE: 78 MMHG | TEMPERATURE: 96.9 F | HEART RATE: 69 BPM | SYSTOLIC BLOOD PRESSURE: 120 MMHG | BODY MASS INDEX: 41.58 KG/M2 | HEIGHT: 71 IN | WEIGHT: 297 LBS

## 2024-03-08 DIAGNOSIS — N18.31 STAGE 3A CHRONIC KIDNEY DISEASE (MULTI): ICD-10-CM

## 2024-03-08 DIAGNOSIS — I25.118 ATHEROSCLEROTIC HEART DISEASE OF NATIVE CORONARY ARTERY WITH OTHER FORMS OF ANGINA PECTORIS (CMS-HCC): ICD-10-CM

## 2024-03-08 DIAGNOSIS — Z79.4 TYPE 2 DIABETES MELLITUS WITH BOTH EYES AFFECTED BY MILD NONPROLIFERATIVE RETINOPATHY WITHOUT MACULAR EDEMA, WITH LONG-TERM CURRENT USE OF INSULIN (MULTI): ICD-10-CM

## 2024-03-08 DIAGNOSIS — E11.3293 TYPE 2 DIABETES MELLITUS WITH BOTH EYES AFFECTED BY MILD NONPROLIFERATIVE RETINOPATHY WITHOUT MACULAR EDEMA, WITH LONG-TERM CURRENT USE OF INSULIN (MULTI): ICD-10-CM

## 2024-03-08 DIAGNOSIS — J44.9 CHRONIC OBSTRUCTIVE PULMONARY DISEASE, UNSPECIFIED COPD TYPE (MULTI): ICD-10-CM

## 2024-03-08 DIAGNOSIS — Z00.00 ROUTINE GENERAL MEDICAL EXAMINATION AT HEALTH CARE FACILITY: Primary | ICD-10-CM

## 2024-03-08 DIAGNOSIS — E66.01 MORBID OBESITY WITH BMI OF 40.0-44.9, ADULT (MULTI): ICD-10-CM

## 2024-03-08 PROBLEM — I11.0 HYPERTENSIVE HEART DISEASE WITH HEART FAILURE (MULTI): Status: RESOLVED | Noted: 2018-06-04 | Resolved: 2024-03-08

## 2024-03-08 PROCEDURE — 1159F MED LIST DOCD IN RCRD: CPT | Performed by: INTERNAL MEDICINE

## 2024-03-08 PROCEDURE — G0439 PPPS, SUBSEQ VISIT: HCPCS | Performed by: INTERNAL MEDICINE

## 2024-03-08 PROCEDURE — 3074F SYST BP LT 130 MM HG: CPT | Performed by: INTERNAL MEDICINE

## 2024-03-08 PROCEDURE — 4010F ACE/ARB THERAPY RXD/TAKEN: CPT | Performed by: INTERNAL MEDICINE

## 2024-03-08 PROCEDURE — 1157F ADVNC CARE PLAN IN RCRD: CPT | Performed by: INTERNAL MEDICINE

## 2024-03-08 PROCEDURE — 1123F ACP DISCUSS/DSCN MKR DOCD: CPT | Performed by: INTERNAL MEDICINE

## 2024-03-08 PROCEDURE — G0447 BEHAVIOR COUNSEL OBESITY 15M: HCPCS | Performed by: INTERNAL MEDICINE

## 2024-03-08 PROCEDURE — 3008F BODY MASS INDEX DOCD: CPT | Performed by: INTERNAL MEDICINE

## 2024-03-08 PROCEDURE — 1036F TOBACCO NON-USER: CPT | Performed by: INTERNAL MEDICINE

## 2024-03-08 PROCEDURE — 1170F FXNL STATUS ASSESSED: CPT | Performed by: INTERNAL MEDICINE

## 2024-03-08 PROCEDURE — 1160F RVW MEDS BY RX/DR IN RCRD: CPT | Performed by: INTERNAL MEDICINE

## 2024-03-08 PROCEDURE — 3078F DIAST BP <80 MM HG: CPT | Performed by: INTERNAL MEDICINE

## 2024-03-08 PROCEDURE — 3052F HG A1C>EQUAL 8.0%<EQUAL 9.0%: CPT | Performed by: INTERNAL MEDICINE

## 2024-03-08 ASSESSMENT — ACTIVITIES OF DAILY LIVING (ADL)
HEARING - LEFT EAR: FUNCTIONAL
MANAGING FINANCES: INDEPENDENT
DOING_HOUSEWORK: NEEDS ASSISTANCE
MANAGING_FINANCES: INDEPENDENT
PATIENT'S MEMORY ADEQUATE TO SAFELY COMPLETE DAILY ACTIVITIES?: YES
TOILETING: INDEPENDENT
ADEQUATE_TO_COMPLETE_ADL: NO
JUDGMENT_ADEQUATE_SAFELY_COMPLETE_DAILY_ACTIVITIES: YES
WALKS IN HOME: INDEPENDENT
PREPARING MEALS: INDEPENDENT
GROCERY SHOPPING: INDEPENDENT
GROOMING: INDEPENDENT
GROOMING: INDEPENDENT
NEEDS ASSISTANCE WITH FOOD: INDEPENDENT
USING TELEPHONE: INDEPENDENT
DRESSING: INDEPENDENT
ADEQUATE_TO_COMPLETE_ADL: NO
GROCERY_SHOPPING: INDEPENDENT
TAKING_MEDICATION: INDEPENDENT
STIL DRIVING: YES
HEARING - RIGHT EAR: FUNCTIONAL
DRESSING YOURSELF: INDEPENDENT
EATING: INDEPENDENT
FEEDING YOURSELF: INDEPENDENT
BATHING: INDEPENDENT
FEEDING YOURSELF: INDEPENDENT
USING TRANSPORTATION: INDEPENDENT
BATHING: INDEPENDENT
DRESSING YOURSELF: INDEPENDENT
TOILETING: INDEPENDENT
JUDGMENT_ADEQUATE_SAFELY_COMPLETE_DAILY_ACTIVITIES: YES
BATHING: INDEPENDENT
TAKING MEDICATION: INDEPENDENT
DOING HOUSEWORK: INDEPENDENT
PATIENT'S MEMORY ADEQUATE TO SAFELY COMPLETE DAILY ACTIVITIES?: YES

## 2024-03-08 ASSESSMENT — ENCOUNTER SYMPTOMS
LOSS OF SENSATION IN FEET: 0
GASTROINTESTINAL NEGATIVE: 1
CARDIOVASCULAR NEGATIVE: 1
RESPIRATORY NEGATIVE: 1
MUSCULOSKELETAL NEGATIVE: 1
DEPRESSION: 0
OCCASIONAL FEELINGS OF UNSTEADINESS: 0
HEMATOLOGIC/LYMPHATIC NEGATIVE: 1
WOUND: 0
NEUROLOGICAL NEGATIVE: 1
FATIGUE: 1

## 2024-03-08 ASSESSMENT — PATIENT HEALTH QUESTIONNAIRE - PHQ9
SUM OF ALL RESPONSES TO PHQ9 QUESTIONS 1 AND 2: 0
1. LITTLE INTEREST OR PLEASURE IN DOING THINGS: NOT AT ALL
1. LITTLE INTEREST OR PLEASURE IN DOING THINGS: NOT AT ALL
2. FEELING DOWN, DEPRESSED OR HOPELESS: NOT AT ALL
2. FEELING DOWN, DEPRESSED OR HOPELESS: NOT AT ALL
SUM OF ALL RESPONSES TO PHQ9 QUESTIONS 1 AND 2: 0

## 2024-03-08 ASSESSMENT — COLUMBIA-SUICIDE SEVERITY RATING SCALE - C-SSRS
1. IN THE PAST MONTH, HAVE YOU WISHED YOU WERE DEAD OR WISHED YOU COULD GO TO SLEEP AND NOT WAKE UP?: NO
2. HAVE YOU ACTUALLY HAD ANY THOUGHTS OF KILLING YOURSELF?: NO

## 2024-03-08 NOTE — PROGRESS NOTES
Monthly outreach to patient.   Patient states completed 5 day course of antibiotic and had follow up with PCP today.   Educated patient to contact PCP with increased cough or shortness of breath as prone to pneumonia..    Pam OLGUIN RN CCM  RN Care Coordinator  [facility]  Phone [phone]

## 2024-03-08 NOTE — PROGRESS NOTES
"Subjective   Reason for Visit: Haile Mcfarland is an 73 y.o. male here for a Medicare Wellness visit.     Past Medical, Surgical, and Family History reviewed and updated in chart.    Reviewed all medications by prescribing practitioner or clinical pharmacist (such as prescriptions, OTCs, herbal therapies and supplements) and documented in the medical record.    73-year-old male patient was seen for wellness exam.  He is hemoglobin A1c is 8.2 that is the best he can do, he has a brittle diabetes, he is a diabetic angiopathy, retinopathy, coronary artery disease, he has a severe COPD in spite of not being a smoker.  He has a 1 episode of respiratory ailment treated with outpatient treatment.  He is a heavyset male patient, he is struggling to lose weight but he is doing as good as he can and also he is on inhaler treatment and also he is on clopidogrel, insulin therapy.  No major events or concerns There was episode of UTI subsided.  He lives in the independent setting, he lives with the spouse.  Does not have any shortness of breath or hypoxemia, does not require any nasal oxygen, he feels as good as he can be lately.        Patient Care Team:  Mustapha Ozuna MD as PCP - General  Mustapha Ozuna MD as PCP - [facility]ributed [facility]  Pam Barclay RN as Care Manager (Case Management)     Review of Systems   Constitutional:  Positive for fatigue.   HENT: Negative.     Eyes:  Positive for visual disturbance.   Respiratory: Negative.     Cardiovascular: Negative.    Gastrointestinal: Negative.    Musculoskeletal: Negative.    Skin:  Negative for wound.   Neurological: Negative.    Hematological: Negative.        Objective   Vitals:  /78 (BP Location: Left arm, Patient Position: Sitting, BP Cuff Size: Adult)   Pulse 69   Temp 36.1 °C (96.9 °F) (Temporal)   Ht 1.797 m (5' 10.75\")   Wt 135 kg (297 lb)   BMI 41.72 kg/m²       Physical Exam  Constitutional:       Appearance: Normal appearance. He is obese. "   HENT:      Head: Normocephalic.      Nose: Nose normal.   Eyes:      Conjunctiva/sclera: Conjunctivae normal.   Cardiovascular:      Rate and Rhythm: Normal rate and regular rhythm.      Pulses: Normal pulses.      Heart sounds: Normal heart sounds.   Pulmonary:      Effort: Pulmonary effort is normal.      Breath sounds: Normal breath sounds.   Abdominal:      Palpations: Abdomen is soft.   Musculoskeletal:         General: No tenderness.      Cervical back: Neck supple.   Skin:     General: Skin is warm and dry.   Neurological:      General: No focal deficit present.      Mental Status: He is oriented to person, place, and time. Mental status is at baseline.   Psychiatric:         Mood and Affect: Mood normal.       Assessment/Plan   Problem List Items Addressed This Visit       Atherosclerotic heart disease of native coronary artery with other forms of angina pectoris (CMS/HCC)    Chronic obstructive pulmonary disease (CMS/HCC)    Stage 3a chronic kidney disease (CMS/HCC)     Other Visit Diagnoses       Routine general medical examination at health care facility    -  Primary    Morbid obesity with BMI of 40.0-44.9, adult (CMS/[facility])            I spent <15 minutes face to face with individual providing recommendations for nutrition choices and exercise plan to help achieve weight reduction goals. Obesity is systemic disorder and it can bring devastating morbidities in furture. It is a matter of calorie gain and loss, keeping bodybank in negative calorie balance mode is the way to sustain weight loss.Diet has a big role in reducing excess body wt. Scheduled and well planned meals and food intake with watchfulness and understanding of calorie portion and distribution is key to understand. Weigh yourself twice a month to understand and follow wt loss goals.  Wellness exam was completed, we are aware that he is morbidly obese it is harder for him to lose weight it is not going to be much less her weight.  He has a  fluctuating renal functions reflecting a stage IIIa CKD reports were reviewed, he has a moderate to severe COPD current status is stable, inhaler to be continued, nebulizer to be continued, there is no angina, blood sugars can be in the best of it, no hypoglycemia with current regimen.  We will continue same medications, he has no complaints or concerns, his vision remains impaired, wife did not have any questions, periodic assessment and follow-up will be done, laboratories  , This concludes wellness exam related preventive care visit for 2024.

## 2024-03-13 ENCOUNTER — TELEPHONE (OUTPATIENT)
Dept: VASCULAR SURGERY | Facility: HOSPITAL | Age: 74
End: 2024-03-13
Payer: MEDICARE

## 2024-03-13 ENCOUNTER — HOSPITAL ENCOUNTER (OUTPATIENT)
Dept: RADIOLOGY | Facility: HOSPITAL | Age: 74
Discharge: HOME | End: 2024-03-13
Payer: MEDICARE

## 2024-03-13 DIAGNOSIS — I65.23 OCCLUSION AND STENOSIS OF BILATERAL CAROTID ARTERIES: ICD-10-CM

## 2024-03-13 PROCEDURE — 93880 EXTRACRANIAL BILAT STUDY: CPT

## 2024-03-13 PROCEDURE — 93880 EXTRACRANIAL BILAT STUDY: CPT | Performed by: RADIOLOGY

## 2024-03-13 NOTE — TELEPHONE ENCOUNTER
I have attempted to contact      . There is no answer at the following phone number   [phone]       . I have  called to confirm that the patient has completed  his vascular tests as recommended by Dr. Tomlinson during his last appointment. Kalie Yan RN BSN

## 2024-03-14 ENCOUNTER — OFFICE VISIT (OUTPATIENT)
Dept: VASCULAR SURGERY | Facility: CLINIC | Age: 74
End: 2024-03-14
Payer: MEDICARE

## 2024-03-14 VITALS
DIASTOLIC BLOOD PRESSURE: 57 MMHG | BODY MASS INDEX: 36.93 KG/M2 | HEART RATE: 72 BPM | RESPIRATION RATE: 16 BRPM | OXYGEN SATURATION: 94 % | TEMPERATURE: 97.1 F | WEIGHT: 297 LBS | SYSTOLIC BLOOD PRESSURE: 129 MMHG | HEIGHT: 75 IN

## 2024-03-14 DIAGNOSIS — I65.21 STENOSIS OF RIGHT CAROTID ARTERY: Primary | ICD-10-CM

## 2024-03-14 DIAGNOSIS — I65.23 CAROTID STENOSIS, ASYMPTOMATIC, BILATERAL: ICD-10-CM

## 2024-03-14 PROCEDURE — 1157F ADVNC CARE PLAN IN RCRD: CPT | Performed by: SURGERY

## 2024-03-14 PROCEDURE — 3008F BODY MASS INDEX DOCD: CPT | Performed by: SURGERY

## 2024-03-14 PROCEDURE — 4010F ACE/ARB THERAPY RXD/TAKEN: CPT | Performed by: SURGERY

## 2024-03-14 PROCEDURE — 1160F RVW MEDS BY RX/DR IN RCRD: CPT | Performed by: SURGERY

## 2024-03-14 PROCEDURE — 1036F TOBACCO NON-USER: CPT | Performed by: SURGERY

## 2024-03-14 PROCEDURE — 3074F SYST BP LT 130 MM HG: CPT | Performed by: SURGERY

## 2024-03-14 PROCEDURE — 3078F DIAST BP <80 MM HG: CPT | Performed by: SURGERY

## 2024-03-14 PROCEDURE — 99214 OFFICE O/P EST MOD 30 MIN: CPT | Performed by: SURGERY

## 2024-03-14 PROCEDURE — 3052F HG A1C>EQUAL 8.0%<EQUAL 9.0%: CPT | Performed by: SURGERY

## 2024-03-14 PROCEDURE — 1159F MED LIST DOCD IN RCRD: CPT | Performed by: SURGERY

## 2024-03-14 PROCEDURE — 1123F ACP DISCUSS/DSCN MKR DOCD: CPT | Performed by: SURGERY

## 2024-03-14 NOTE — PROGRESS NOTES
Vascular Surgery Clinic Note    CC: carotid    HPI:  Haile Mcfarland is 73 y.o. male with history of R TIA and subsequent R CEA in 2021 by Dr. Mtz. He has done well. Duplex this year shows no evidence of carotid stenosis in either L or R ICA. He has not had stroke symptoms. 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     Medical History:   has no past medical history on file.    Meds:   Current Outpatient Medications on File Prior to Visit   Medication Sig Dispense Refill    amLODIPine (Norvasc) 10 mg tablet TAKE 1 TABLET BY MOUTH ONCE DAILY IN THE EVENING 90 tablet 3    aspirin 81 mg EC tablet Take 1 tablet (81 mg) by mouth once daily.      atorvastatin (Lipitor) 80 mg tablet TAKE 1 TABLET BY MOUTH ONCE DAILY AT BEDTIME 90 tablet 3    carvedilol (Coreg) 12.5 mg tablet TAKE 1 TABLET BY MOUTH TWICE DAILY WITH MEALS 180 tablet 0    clopidogrel (Plavix) 75 mg tablet Take 1 tablet by mouth once daily 90 tablet 3    fluticasone-umeclidin-vilanter (Trelegy Ellipta) 100-62.5-25 mcg blister with device Inhale 1 puff once daily. Rinse mouth with water after use to reduce aftertaste and incidence of candidiasis. Do not swallow. 1 each 6    hydroCHLOROthiazide (HYDRODiuril) 25 mg tablet TAKE 1 TABLET BY MOUTH ONCE DAILY IN THE MORNING 90 tablet 3    ipratropium-albuteroL (Duo-Neb) 0.5-2.5 mg/3 mL nebulizer solution USE 1 AMPULE IN NEBULIZER EVERY 8 HOURS AS NEEDED FOR WHEEZING FOR SHORTNESS OF BREATH 180 mL 0    losartan (Cozaar) 50 mg tablet Take 1 tablet by mouth twice daily 180 tablet 0    Lumigan 0.01 % ophthalmic solution INSTILL 1 DROP INTO RIGHT EYE AT BEDTIME 3 mL 0    magnesium oxide (Mag-Ox) 400 mg (241.3 mg magnesium) tablet Take 1 tablet (400 mg) by mouth once daily.      metFORMIN (Glucophage) 1,000 mg tablet Take by mouth twice a day.      nitroglycerin (Nitrostat) 0.4 mg SL tablet DISSOLVE ONE TABLET UNDER THE TONGUE EVERY 5 MINUTES AS NEEDED FOR CHEST PAIN.  DO NOT  "EXCEED A TOTAL OF 3 DOSES IN 15 MINUTES 25 tablet 0    NovoLOG FlexPen U-100 Insulin 100 unit/mL (3 mL) pen INJECT 25 UNITS SUBCUTANEOUSLY THREE TIMES DAILY BEFORE MEAL(S)      pantoprazole (ProtoNix) 40 mg EC tablet Take 1 tablet (40 mg) by mouth once daily.      pen needle, diabetic 32 gauge x 5/32\" needle USE 1 NEEDLE 3 TO 4 TIMES DAILY AS NEEDED 300 each 0    prednisoLONE acetate (Pred-Forte) 1 % ophthalmic suspension Administer 1 drop into the left eye 2 times a day. 10 mL 11    Simbrinza 1-0.2 % drops,suspension ophthalmic suspension Administer 1 drop into the right eye 2 times a day. 10 mL 11    Toujeo SoloStar U-300 Insulin 300 unit/mL (1.5 mL) injection INJECT 50 UNITS SUBCUTANEOUSLY TWICE DAILY 36 mL 0    [DISCONTINUED] fluticasone furoate-vilanteroL (Breo Ellipta) 200-25 mcg/dose inhaler Inhale 1 puff once daily.      [DISCONTINUED] furosemide (Lasix) 20 mg tablet Take 1 tablet (20 mg) by mouth once daily. 29 tablet 0    [DISCONTINUED] levoFLOXacin (Levaquin) 500 mg tablet Take 1 tablet (500 mg) by mouth once daily for 5 days. 5 tablet 0     No current facility-administered medications on file prior to visit.        Allergies:   Allergies   Allergen Reactions    Penicillins Other and Unknown     From childhood. Unsure of reaction.       SH:    Social Determinants of Health     Tobacco Use: Low Risk  (3/14/2024)    Patient History     Smoking Tobacco Use: Never     Smokeless Tobacco Use: Never     Passive Exposure: Not on file   Alcohol Use: Not At Risk (6/2/2023)    AUDIT-C     Frequency of Alcohol Consumption: Never     Average Number of Drinks: Patient does not drink     Frequency of Binge Drinking: Not on file   Financial Resource Strain: Low Risk  (4/10/2023)    Overall Financial Resource Strain (CARDIA)     Difficulty of Paying Living Expenses: Not hard at all   Food Insecurity: Unknown (6/2/2023)    Hunger Vital Sign     Worried About Running Out of Food in the Last Year: Never true     Ran Out of " Food in the Last Year: Not on file   Transportation Needs: No Transportation Needs (6/2/2023)    PRAPARE - Transportation     Lack of Transportation (Medical): No     Lack of Transportation (Non-Medical): No   Physical Activity: Not on file   Stress: Not on file   Social Connections: Unknown (6/2/2023)    Social Connection and Isolation Panel [NHANES]     Frequency of Communication with Friends and Family: Not on file     Frequency of Social Gatherings with Friends and Family: Not on file     Attends Oriental orthodox Services: Not on file     Active Member of Clubs or Organizations: Not on file     Attends Club or Organization Meetings: Not on file     Marital Status:    Intimate Partner Violence: Not on file   Depression: Not at risk (3/8/2024)    PHQ-2     PHQ-2 Score: 0   Housing Stability: Unknown (6/2/2023)    Housing Stability Vital Sign     Unable to Pay for Housing in the Last Year: No     Number of Places Lived in the Last Year: Not on file     Unstable Housing in the Last Year: Not on file   Utilities: Not on file   Digital Equity: Not on file        FH:  No family history on file.     ROS:  All systems were reviewed and are negative except as per HPI.    Objective:  Vitals:  Vitals:    03/14/24 0904   BP: 129/57   Pulse: 72   Resp: 16   Temp: 36.2 °C (97.1 °F)   SpO2: 94%        Exam:  In NAD, well appearing  Abd Soft, ND/NT  Vascular examination:  Right neck scar well healed.     Assessment & Plan:  Haile Mcfarland is 73 y.o. male doing well following R CEA 3 years ago. Rtc yearly with carotid duplex.      I spent a total of 30 minutes on the day of the visit.         Morgan Tomlinson M.D.

## 2024-03-18 ENCOUNTER — APPOINTMENT (OUTPATIENT)
Dept: VASCULAR SURGERY | Facility: CLINIC | Age: 74
End: 2024-03-18
Payer: MEDICARE

## 2024-03-22 ENCOUNTER — PATIENT OUTREACH (OUTPATIENT)
Dept: CARE COORDINATION | Facility: CLINIC | Age: 74
End: 2024-03-22
Payer: MEDICARE

## 2024-03-22 ENCOUNTER — HOSPITAL ENCOUNTER (OUTPATIENT)
Dept: CARDIOLOGY | Facility: HOSPITAL | Age: 74
Discharge: HOME | End: 2024-03-22
Payer: MEDICARE

## 2024-03-22 DIAGNOSIS — Z95.818 PRESENCE OF CARDIAC DEVICE: ICD-10-CM

## 2024-03-22 DIAGNOSIS — R55 SYNCOPE AND COLLAPSE: ICD-10-CM

## 2024-03-22 PROCEDURE — 93298 REM INTERROG DEV EVAL SCRMS: CPT

## 2024-03-22 PROCEDURE — 93298 REM INTERROG DEV EVAL SCRMS: CPT | Performed by: INTERNAL MEDICINE

## 2024-03-22 NOTE — PROGRESS NOTES
Monthly outreach to patient.  Patient reports respiratory status is stable. Pt reports high cost of eye drops, but declines PharmD assistance stating wife is still working and income too high.  Discussed out of country medication options that patient will consider.   Appt scheduled in the future with vascular surgery and podiatry.      Pam OLGUIN RN CCM  RN Care Coordinator  [facility]  Phone [phone]

## 2024-04-01 NOTE — PROGRESS NOTES
Patient: Haile Mcfarland    [id number]  : 1950 -- AGE 73 y.o.    Provider: Mary Martinez CMA     Location [facility]   Service Date: 24          [facility]  Follow Up Visit Note    HISTORY OF PRESENT ILLNESS       HISTORY OF PRESENT ILLNESS   Haile Mcfarland is a 73 y.o. male with a h/o COPD, Asthma, CHF, HTN, HLD and DM2, who is a never smoker, who presents to a [facility] for a follow up evaluation for COPD/Asthma .     I have independently interviewed and examined the patient in the office and reviewed available records.    Current History    Since last visit reports starting Trelegy 100 after our last visit, states it made a difference and improved his breathing.   Nebulizer- three times a day. Helps cough up mucus, clear to yellow in color.  Takes his time so he dose not get SOB.  Denies wheezing, chest tightness,SOB at rest and ER visits for breathing issues.  Allergies -  wears mask when cutting grass. No OTC allergy meds.    10/11/23: On today's visit, the patient reports he wento the ER for SOB and wheezing, he was admitted for 3 days. He states he had pneumonia, after reviewing ED note he was diagnosed with a COPD exacerbation not pneumonia. He was discharged home with antibiotics, steroids and breo inhaler. He ran out of the Breo inhaler, he was using once a day. He is using his nebulizer 3 times a day. He was coughing up tan/green mucus but has decreased, he reports feeling better but not back to baseline. He reports feeling ROE sometimes. Denies wheezing, Sob at rest, chest pain, fevers and chills.     23: He had an ED visit 23 for SOB found to be having a congestive heart failure exacerbation, he was admitted for 3 days and sent home with lasix 20mg a day, he reports his bilateral leg swelling has decreased significantly in the past week. He has an appt with his cardiologist next week. He reports  his respiratory status is stable, and that he is feeling better. He uses his symbicort 2 or 3 times a month and nebulizer every morning, never needs to use his proair. Occasionally coughs up clear mucus. He denies ROE unless he is really pushing himself. He wears a mask to cut his grass.     Previous pulmonary history: COPD possible asthma with airway remodeling, childhood asthma    Inhalers/nebulized medications: Symbicort, Breo, albuterol nebs    Hospitalization History: He has not been hospitalized over the last year for breathing related problem.    Sleep history: Denies snoring, apnea, feeling tired during the day or taking naps during the day.     ALLERGIES AND MEDICATIONS     ALLERGIES  Allergies   Allergen Reactions    Penicillins Other and Unknown     From childhood. Unsure of reaction.       MEDICATIONS  Current Outpatient Medications   Medication Sig Dispense Refill    amLODIPine (Norvasc) 10 mg tablet TAKE 1 TABLET BY MOUTH ONCE DAILY IN THE EVENING 90 tablet 3    aspirin 81 mg EC tablet Take 1 tablet (81 mg) by mouth once daily.      atorvastatin (Lipitor) 80 mg tablet TAKE 1 TABLET BY MOUTH ONCE DAILY AT BEDTIME 90 tablet 3    carvedilol (Coreg) 12.5 mg tablet TAKE 1 TABLET BY MOUTH TWICE DAILY WITH MEALS 180 tablet 0    clopidogrel (Plavix) 75 mg tablet Take 1 tablet by mouth once daily 90 tablet 3    fluticasone-umeclidin-vilanter (Trelegy Ellipta) 100-62.5-25 mcg blister with device Inhale 1 puff once daily. Rinse mouth with water after use to reduce aftertaste and incidence of candidiasis. Do not swallow. 1 each 6    hydroCHLOROthiazide (HYDRODiuril) 25 mg tablet TAKE 1 TABLET BY MOUTH ONCE DAILY IN THE MORNING 90 tablet 3    ipratropium-albuteroL (Duo-Neb) 0.5-2.5 mg/3 mL nebulizer solution USE 1 AMPULE IN NEBULIZER EVERY 8 HOURS AS NEEDED FOR WHEEZING FOR SHORTNESS OF BREATH 180 mL 0    losartan (Cozaar) 50 mg tablet Take 1 tablet by mouth twice daily 180 tablet 0    Lumigan 0.01 % ophthalmic  "solution INSTILL 1 DROP INTO RIGHT EYE AT BEDTIME 3 mL 0    magnesium oxide (Mag-Ox) 400 mg (241.3 mg magnesium) tablet Take 1 tablet (400 mg) by mouth once daily.      metFORMIN (Glucophage) 1,000 mg tablet Take by mouth twice a day.      nitroglycerin (Nitrostat) 0.4 mg SL tablet DISSOLVE ONE TABLET UNDER THE TONGUE EVERY 5 MINUTES AS NEEDED FOR CHEST PAIN.  DO NOT EXCEED A TOTAL OF 3 DOSES IN 15 MINUTES 25 tablet 0    NovoLOG FlexPen U-100 Insulin 100 unit/mL (3 mL) pen INJECT 25 UNITS SUBCUTANEOUSLY THREE TIMES DAILY BEFORE MEAL(S)      pantoprazole (ProtoNix) 40 mg EC tablet Take 1 tablet (40 mg) by mouth once daily.      pen needle, diabetic 32 gauge x 5/32\" needle USE 1 NEEDLE 3 TO 4 TIMES DAILY AS NEEDED 300 each 0    prednisoLONE acetate (Pred-Forte) 1 % ophthalmic suspension Administer 1 drop into the left eye 2 times a day. 10 mL 11    Simbrinza 1-0.2 % drops,suspension ophthalmic suspension Administer 1 drop into the right eye 2 times a day. 10 mL 11    Toujeo SoloStar U-300 Insulin 300 unit/mL (1.5 mL) injection INJECT 50 UNITS SUBCUTANEOUSLY TWICE DAILY 36 mL 0     No current facility-administered medications for this visit.         PAST HISTORY     PAST MEDICAL HISTORY  COPD  Asthma  CHF  HTN  HLD  DM2    PAST SURGICAL HISTORY  Past Surgical History:   Procedure Laterality Date    CATARACT EXTRACTION      CT ANGIO NECK  02/03/2021    CT NECK ANGIO W AND WO IV CONTRAST 2/3/2021 [facility] CLINICAL LEGACY    OTHER SURGICAL HISTORY  04/24/2019    Cervical laminectomy    OTHER SURGICAL HISTORY  10/21/2021    Knee replacement    OTHER SURGICAL HISTORY  10/21/2021    Percutaneous transluminal coronary angioplasty    OTHER SURGICAL HISTORY  10/21/2021    Kidney surgery    OTHER SURGICAL HISTORY  10/21/2021    Neck surgery    OTHER SURGICAL HISTORY  10/21/2021    Colonoscopy    OTHER SURGICAL HISTORY  10/21/2021    Tonsillectomy    OTHER SURGICAL HISTORY  10/21/2021    PTA carotid       IMMUNIZATION " HISTORY  Immunization History   Administered Date(s) Administered    Flu vaccine, quadrivalent, high-dose, preservative free, age 65y+ (FLUZONE) 01/04/2022, 09/07/2023    Influenza, High Dose Seasonal, Preservative Free 01/16/2018, 09/26/2019, 10/08/2019, 11/15/2020, 11/16/2020, 09/18/2022    Influenza, Unspecified 08/30/2018    Influenza, seasonal, injectable 11/07/2015, 10/22/2018, 11/22/2020    Pfizer COVID-19 vaccine, Fall 2023, 12 years and older, (30mcg/0.3mL) 11/11/2023    Pfizer Purple Cap SARS-CoV-2 03/12/2021, 04/03/2021, 10/13/2021    Pneumococcal conjugate vaccine, 13-valent (PREVNAR 13) 10/23/2018    Pneumococcal conjugate vaccine, 20-valent (PREVNAR 20) 02/27/2023    Zoster vaccine, recombinant, adult (SHINGRIX) 02/27/2023, 07/10/2023       SOCIAL HISTORY  Smoking: never  Illicit drug use: none   Drinking alcohol: none    OCCUPATIONAL/ENVIRONMENTAL HISTORY  Previously worked as: Retired form the [employer].   Exposure to asbestos, silica, beryllium or inhaled metals.  No exposure to birds or exotic animals.    FAMILY HISTORY  No family history of pulmonary disease.  No family history of cancer.  No family history of autoimmune disorders.    REVIEW OF SYSTEMS     REVIEW OF SYSTEMS  Review of Systems    Constitutional: No fever, no chills, no night sweats.    Eyes: No double vision, no floaters, no dry eyes.   ENT: See HPI.   Neck: No neck stiffness.  Cardiovascular: No sharp chest pain, no heart racing, no leg swelling.  Respiratory: as noted in HPI.   Integumentary: No rashes or sores.  Neurological: No dizziness, no headaches. Sleeping well.  Psychiatric: No mood changes.       PHYSICAL EXAM     VITAL SIGNS:   Vitals:    04/11/24 1243   BP: 148/71   Pulse: 69   Resp: 18   Temp: 36.9 °C (98.4 °F)   SpO2: 93%         CURRENT WEIGHT: Body mass index is 39.18 kg/m².      PREVIOUS WEIGHTS:  Wt Readings from Last 3 Encounters:   03/14/24 135 kg (297 lb)   03/08/24 135 kg (297 lb)   12/07/23 137 kg (301 lb)        Physical Exam    Constitutional: General appearance: Alert and oriented.  No acute distress. Well developed, well nourished.  Head and face: Symmetric  Pulmonary: Chest is normal. No increased work of breathing or signs of respiratory distress. + wheezing on expiratory to auscultation bilaterally - no crackles, wheezing, or rhonchi.   Cardiovascular: Heart rate and rhythm normal. Normal S1, S2 - no murmurs, gallops, or pericardial rub.   Abdomen: Soft, non tender, +BS  Extremities: No edema. No clubbing or cyanosis of the fingernails.    Neurologic: Moves all four extremities   MSK: Normal movements of extremities. Gait normal   Psychiatric: Intact judgement and insight.    RESULTS/DATA     Pulmonary Function Test Results                     Chest Radiograph     XR chest 1 view 08/19/2023    Narrative  Interpreted By:  ADALI ALEJANDRO MD  MRN: [id number]  Patient Name: RUSTY GALARZA    STUDY:  Chest dated  8/19/2023.    INDICATION:  SOB    COMPARISON:  Chest dated 05/29/2023.    ACCESSION NUMBER(S):  [id number]    ORDERING CLINICIAN:  SAMI BROWNE    TECHNIQUE:  AP upright portable radiograph of the chest.    FINDINGS:  Linear opacities are seen in the right lower lung zone similar to the  prior exam.  No pneumothorax or effusion is evident. The  cardiomediastinal silhouette is  not enlarged.Degenerative change is  seen of the spine and shoulders.    Impression  Right basilar scarring and/or atelectasis.      Chest CT Scan     CT angio chest for pulmonary embolism 08/19/2023    Narrative  Interpreted By:  RENARD FREEMAN DO  MRN: [id number]  Patient Name: RUSTY GALARZA    STUDY:  CT ANGIO CHEST FOR PE;  8/19/2023 9:31 pm    INDICATION:  sob    COMPARISON:  CT angiogram chest 05/29/2023. Chest x-ray 08/19/2023    ACCESSION NUMBER(S):  [id number]    ORDERING CLINICIAN:  JONEL CARR    TECHNIQUE:  Helical data acquisition of the chest was obtained following the  uneventful administration of  75 milliliter  OMNIPAQUE 350intravenous  contrast material. Images were reformatted in axial, coronal, and  sagittal planes. MIP images were created and reviewed.    FINDINGS:  POTENTIAL LIMITATIONS OF THE STUDY: Motion artifact.    HEART AND VESSELS:  No discrete filling defects within the main pulmonary artery or its  branches.    No thoracic aortic aneurysm or acute dissection.  The main pulmonary artery is dilated.  The heart is not enlarged.  Coronary artery calcifications are noted.  No evidence of pericardial effusion.    MEDIASTINUM AND GRETCHEN, LOWER NECK AND AXILLA:  The visualized thyroid gland is within normal limits.    No evidence of thoracic lymphadenopathy by CT criteria.    LUNGS AND AIRWAYS:  The trachea and central airways are patent.    The evaluation of the lungs is degraded by motion artifact. Mild  bilateral atelectasis/scarring. Redemonstration of trace right  pleural effusion. No focal consolidation. No pneumothorax.  Redemonstration of bilateral calcified pleural plaque.    UPPER ABDOMEN:  Calcific foci at the spleen are probably representing granulomas.  There is colonic diverticulosis    CHEST WALL AND OSSEOUS STRUCTURES:  There is mild bilateral gynecomastia.  A cardiac monitor device is noted at the soft tissues of the left  anterior chest wall.  Multilevel degenerative changes at the spine.    Impression  1.  No evidence of pulmonary emboli.  2.  Mild bilateral atelectasis/scarring. Trace right pleural  effusion. Bilateral calcified pleural plaque.  3.  Coronary artery calcifications. Dilated main pulmonary artery,  may be seen with pulmonary arterial hypertension.      Echocardiogram     Echocardiogram     [place] [id number]  Tel [phone] Fax [phone]    TRANSTHORACIC ECHOCARDIOGRAM REPORT      Patient Name:     RUSTY WOODSON MICHELL   Reading Physician:  [id number] Scott Askew MD, [facility]  Study Date:       5/31/2023          Referring            JAZMYN QUINTANILLA  Physician:  MRN/PID:          [id number]           PCP:  Accession/Order#: [id number]          Department          [facility] Echo  Location:           Lab  YOB: 1950          Fellow:  Gender:           M                  Nurse:  Admit Date:       5/29/2023          Sonographer:        Re Gil [facility]  Admission Status: Inpatient -        Additional Staff:  Routine  Height:           190.00 cm          CC Report to:       [address] [place] [address]  Weight:           129.00 kg          Study Type:         Echocardiogram  BSA:              2.54 m2  Blood Pressure: 109 /54 mmHg    Diagnosis/ICD: R06.00-Dyspnea, unspecified  Indication:    Dyspnea  Procedure/CPT: Echo Complete w Full [id number]    Patient History:  Diabetes:          Yes  Pertinent History: COPD, HTN, Hyperlipidemia, CAD and PCI x5.    Study Detail: The following Echo studies were performed: 2D, M-Mode, Doppler and  color flow. Technically challenging study due to poor acoustic  windows, prominent lung artifact and body habitus. A bubble study  was not performed.      PHYSICIAN INTERPRETATION:  Left Ventricle: Left ventricular systolic function is normal, with an estimated ejection fraction of 65-70%. There are no regional wall motion abnormalities. The left ventricular cavity size is normal. The left ventricular septal wall thickness is normal. There is normal left ventricular posterior wall thickness. Spectral Doppler shows an impaired relaxation pattern of left ventricular diastolic filling.  Left Atrium: The left atrium is mildly dilated. Left atrial volume index 32.1 mL/m2.  Right Ventricle: The right ventricle is normal in size. There is normal right ventricular global systolic function. Normal right ventricular chamber size and function.  Right Atrium: The right atrium is normal in size.  Aortic Valve: The aortic valve is trileaflet. There is minimal aortic valve cusp calcification. There is trace to mild aortic  valve regurgitation. The peak instantaneous gradient of the aortic valve is 12.1 mmHg. The mean gradient of the aortic valve is 6.0 mmHg. Trivial to 1+ aortic regurgitation.  Mitral Valve: The mitral valve is normal in structure. There is trace mitral valve regurgitation.  Tricuspid Valve: The tricuspid valve is structurally normal. There is trace to mild tricuspid regurgitation. Trivial to 1+ tricuspid regurgitation with estimated RVSP 47 mmHg consistent with moderately increased right sided pressures.  Pulmonic Valve: The pulmonic valve is structurally normal. There is no indication of pulmonic valve regurgitation.  Pericardium: There is no pericardial effusion noted. There is a pericardial fat pad present.  Aorta: The aortic root is normal.  Systemic Veins: There is IVC inspiratory collapse greater than 50%.      CONCLUSIONS:  1. Left ventricular systolic function is normal with a 65-70% estimated ejection fraction.  2. Spectral Doppler shows an impaired relaxation pattern of left ventricular diastolic filling.  3. Normal right ventricular chamber size and function.  4. Trivial to 1+ tricuspid regurgitation with estimated RVSP 47 mmHg consistent with moderately increased right sided pressures.  5. Trivial to 1+ aortic regurgitation.  6. Comparison study dated January 23, 2021 showed estimated LV ejection fraction 60 to 65% with trivial tricuspid regurgitation. RVSP was unable to be estimated.    QUANTITATIVE DATA SUMMARY:  2D MEASUREMENTS:  Normal Ranges:  Ao Root d:     3.30 cm   (2.0-3.7cm)  LAs:           4.30 cm   (2.7-4.0cm)  IVSd:          1.16 cm   (0.6-1.1cm)  LVPWd:         1.12 cm   (0.6-1.1cm)  LVIDd:         5.40 cm   (3.9-5.9cm)  LVIDs:         2.70 cm  LV Mass Index: 96.9 g/m2  LV % FS        50.0 %    LA VOLUME:  Normal Ranges:  LA Vol A4C:        74.8 ml    (22+/-6mL/m2)  LA Vol A2C:        81.6 ml  LA Vol BP:         84.4 ml  LA Vol Index A4C:  29.4ml/m2  LA Vol Index A2C:  32.1 ml/m2  LA Vol  Index BP:   33.2 ml/m2  LA Area A4C:       21.8 cm2  LA Area A2C:       24.6 cm2  LA Major Axis A4C: 5.4 cm  LA Major Axis A2C: 6.3 cm  LA Volume Index:   30.7 ml/m2  LA Vol A4C:        69.0 ml  LA Vol A2C:        79.0 ml    RA VOLUME BY A/L METHOD:  Normal Ranges:  RA Vol A4C:        27.2 ml    (8.3-19.5ml)  RA Vol Index A4C:  10.7 ml/m2  RA Area A4C:       12.9 cm2  RA Major Axis A4C: 5.2 cm    LV SYSTOLIC FUNCTION BY 2D PLANIMETRY (MOD):  Normal Ranges:  EF-A4C View: 75.2 % (>=55%)  EF-A2C View: 62.7 %  EF-Biplane:  68.5 %    LV DIASTOLIC FUNCTION:  Normal Ranges:  MV Peak E:      0.81 m/s   (0.7-1.2 m/s)  MV Peak A:      0.87 m/s   (0.42-0.7 m/s)  E/A Ratio:      0.93       (1.0-2.2)  MV e'           0.11 m/s   (>8.0)  MV lateral e'   0.11 m/s  MV medial e'    0.07 m/s  E/e' Ratio:     7.35       (<8.0)  PulmV Sys Morris:  86.20 cm/s  PulmV Russell Morris: 47.60 cm/s  PulmV S/D Morris:  1.80    MITRAL VALVE:  Normal Ranges:  MV DT: 238 msec (150-240msec)    AORTIC VALVE:  Normal Ranges:  AoV Vmax:                1.74 m/s  (<=1.7m/s)  AoV Peak P.1 mmHg (<20mmHg)  AoV Mean P.0 mmHg  (1.7-11.5mmHg)  LVOT Max Morris:            1.15 m/s  (<=1.1m/s)  AoV VTI:                 37.70 cm  (18-25cm)  LVOT VTI:                26.70 cm  LVOT Diameter:           2.00 cm   (1.8-2.4cm)  AoV Area, VTI:           2.22 cm2  (2.5-5.5cm2)  AoV Area,Vmax:           2.08 cm2  (2.5-4.5cm2)  AoV Dimensionless Index: 0.71    RIGHT VENTRICLE:  RV 1 3.9 cm  RV 2 3.3 cm  RV 3 7.2 cm    TRICUSPID VALVE/RVSP:  Normal Ranges:  Peak TR Velocity: 3.34 m/s  RV Syst Pressure: 47.6 mmHg (< 30mmHg)    PULMONIC VALVE:  Normal Ranges:  PV Accel Time: 98 msec  (>120ms)  PV Max Morris:    1.3 m/s  (0.6-0.9m/s)  PV Max P.2 mmHg  PV Mean P.0 mmHg  PV VTI:        30.90 cm    Pulmonary Veins:  PulmV Russell Morris: 47.60 cm/s  PulmV S/D Morris:  1.80  PulmV Sys Morris:  86.20 cm/s      [id number] Scott Askew MD, FACC  Electronically  signed on 5/31/2023 at 12:58:23 PM      ASSESSMENT/PLAN     Mr. Mcfarland is a 73 y.o. male with a h/o COPD, Asthma, CHF, HTN, HLD and DM2, who is a never smoker, who presents to a [facility] for a follow up evaluation for COPD/Asthma .     *Bilateral calcified pleural plaque on 8/19/23 CT/PE *    Problem List and Orders  Diagnoses and all orders for this visit:  Chronic bronchitis, unspecified chronic bronchitis type (CMS/[facility])  -     fluticasone-umeclidin-vilanter (Trelegy Ellipta) 100-62.5-25 mcg blister with device; Inhale 1 puff once daily. RINSE  MOUTH AFTER EACH USE TO AVOID ORAL THRUSH.  Obstructive airway disease (CMS/[facility])  -     Aerosol Machine for home use - Purchase  Pneumonia due to infectious organism, unspecified laterality, unspecified part of lung  -     XR chest 2 views; Future    Assessment and Plan / Recommendations:  Problem List Items Addressed This Visit    None     COPD/Asthma overlap; (severe obstruction) with airway remodeling  - Continue Trelegy 1 puff once day  - Continue albuterol HFA inhaler or albuterol nebulizer every 4-6 hours as needed         Follow in 6 months or sooner if needed     If you have any questions please call the office [phone]    Thank you for visiting the Pulmonary clinic today!   Mary Beth Vo CNP  [phone]

## 2024-04-05 ENCOUNTER — PATIENT OUTREACH (OUTPATIENT)
Dept: CARE COORDINATION | Facility: CLINIC | Age: 74
End: 2024-04-05
Payer: MEDICARE

## 2024-04-05 NOTE — PROGRESS NOTES
Call from patient stating his wife's prescription for Dexlansoprazole is $280 per month and asks if there is a generic.   Per Good Rx website this drug is generally not covered by Medicare Part B and some plans may require prior auth.  Educated patient to contact Rehana, his  prescription provider to inquire if covered and if so measures required to cover. Such.   Educated patient process of prior auth if this is required by prescription plan provider.     Pam OLGUIN RN CCM  RN Care Coordinator  [facility]  Phone [phone]  
no

## 2024-04-11 ENCOUNTER — OFFICE VISIT (OUTPATIENT)
Dept: PULMONOLOGY | Facility: CLINIC | Age: 74
End: 2024-04-11
Payer: MEDICARE

## 2024-04-11 VITALS
HEART RATE: 69 BPM | BODY MASS INDEX: 39.36 KG/M2 | WEIGHT: 297 LBS | OXYGEN SATURATION: 93 % | SYSTOLIC BLOOD PRESSURE: 148 MMHG | TEMPERATURE: 98.4 F | HEIGHT: 73 IN | RESPIRATION RATE: 18 BRPM | DIASTOLIC BLOOD PRESSURE: 71 MMHG

## 2024-04-11 DIAGNOSIS — J44.9 OBSTRUCTIVE AIRWAY DISEASE (MULTI): ICD-10-CM

## 2024-04-11 DIAGNOSIS — I10 ESSENTIAL HYPERTENSION, BENIGN: ICD-10-CM

## 2024-04-11 PROCEDURE — 4010F ACE/ARB THERAPY RXD/TAKEN: CPT

## 2024-04-11 PROCEDURE — 1159F MED LIST DOCD IN RCRD: CPT

## 2024-04-11 PROCEDURE — 3077F SYST BP >= 140 MM HG: CPT

## 2024-04-11 PROCEDURE — 3078F DIAST BP <80 MM HG: CPT

## 2024-04-11 PROCEDURE — 1160F RVW MEDS BY RX/DR IN RCRD: CPT

## 2024-04-11 PROCEDURE — 1123F ACP DISCUSS/DSCN MKR DOCD: CPT

## 2024-04-11 PROCEDURE — 3008F BODY MASS INDEX DOCD: CPT

## 2024-04-11 PROCEDURE — 1036F TOBACCO NON-USER: CPT

## 2024-04-11 PROCEDURE — 1157F ADVNC CARE PLAN IN RCRD: CPT

## 2024-04-11 PROCEDURE — 3052F HG A1C>EQUAL 8.0%<EQUAL 9.0%: CPT

## 2024-04-11 PROCEDURE — 99214 OFFICE O/P EST MOD 30 MIN: CPT

## 2024-04-11 RX ORDER — FLUTICASONE FUROATE, UMECLIDINIUM BROMIDE AND VILANTEROL TRIFENATATE 100; 62.5; 25 UG/1; UG/1; UG/1
1 POWDER RESPIRATORY (INHALATION) DAILY
Qty: 1 EACH | Refills: 6 | Status: SHIPPED | OUTPATIENT
Start: 2024-04-11

## 2024-04-11 RX ORDER — IPRATROPIUM BROMIDE AND ALBUTEROL SULFATE 2.5; .5 MG/3ML; MG/3ML
SOLUTION RESPIRATORY (INHALATION)
Qty: 180 ML | Refills: 0 | Status: SHIPPED | OUTPATIENT
Start: 2024-04-11 | End: 2024-04-12 | Stop reason: SDUPTHER

## 2024-04-11 ASSESSMENT — COLUMBIA-SUICIDE SEVERITY RATING SCALE - C-SSRS
2. HAVE YOU ACTUALLY HAD ANY THOUGHTS OF KILLING YOURSELF?: NO
1. IN THE PAST MONTH, HAVE YOU WISHED YOU WERE DEAD OR WISHED YOU COULD GO TO SLEEP AND NOT WAKE UP?: NO
6. HAVE YOU EVER DONE ANYTHING, STARTED TO DO ANYTHING, OR PREPARED TO DO ANYTHING TO END YOUR LIFE?: NO

## 2024-04-11 ASSESSMENT — PATIENT HEALTH QUESTIONNAIRE - PHQ9
2. FEELING DOWN, DEPRESSED OR HOPELESS: NOT AT ALL
SUM OF ALL RESPONSES TO PHQ9 QUESTIONS 1 AND 2: 0
1. LITTLE INTEREST OR PLEASURE IN DOING THINGS: NOT AT ALL

## 2024-04-11 ASSESSMENT — ENCOUNTER SYMPTOMS
OCCASIONAL FEELINGS OF UNSTEADINESS: 0
LOSS OF SENSATION IN FEET: 0
DEPRESSION: 0

## 2024-04-12 DIAGNOSIS — J44.9 CHRONIC OBSTRUCTIVE PULMONARY DISEASE, UNSPECIFIED COPD TYPE (MULTI): ICD-10-CM

## 2024-04-12 RX ORDER — IPRATROPIUM BROMIDE AND ALBUTEROL SULFATE 2.5; .5 MG/3ML; MG/3ML
SOLUTION RESPIRATORY (INHALATION)
Qty: 180 ML | Refills: 0 | Status: SHIPPED | OUTPATIENT
Start: 2024-04-12

## 2024-04-14 DIAGNOSIS — E11.3293 TYPE 2 DIABETES MELLITUS WITH BOTH EYES AFFECTED BY MILD NONPROLIFERATIVE RETINOPATHY WITHOUT MACULAR EDEMA, WITHOUT LONG-TERM CURRENT USE OF INSULIN (MULTI): Primary | ICD-10-CM

## 2024-04-15 RX ORDER — METFORMIN HYDROCHLORIDE 1000 MG/1
1000 TABLET ORAL
Qty: 180 TABLET | Refills: 0 | Status: SHIPPED | OUTPATIENT
Start: 2024-04-15

## 2024-04-19 ENCOUNTER — HOSPITAL ENCOUNTER (OUTPATIENT)
Dept: CARDIOLOGY | Facility: HOSPITAL | Age: 74
Discharge: HOME | End: 2024-04-19
Payer: MEDICARE

## 2024-04-19 DIAGNOSIS — R55 SYNCOPE AND COLLAPSE: ICD-10-CM

## 2024-04-19 DIAGNOSIS — Z95.818 PRESENCE OF CARDIAC DEVICE: ICD-10-CM

## 2024-04-22 DIAGNOSIS — J44.9 CHRONIC OBSTRUCTIVE PULMONARY DISEASE, UNSPECIFIED COPD TYPE (MULTI): ICD-10-CM

## 2024-04-22 RX ORDER — ALBUTEROL SULFATE 0.83 MG/ML
SOLUTION RESPIRATORY (INHALATION)
Qty: 15 ML | Refills: 0 | Status: SHIPPED | OUTPATIENT
Start: 2024-04-22

## 2024-04-22 RX ORDER — ALBUTEROL SULFATE 0.83 MG/ML
SOLUTION RESPIRATORY (INHALATION)
Qty: 15 ML | Refills: 0 | Status: SHIPPED | OUTPATIENT
Start: 2024-04-22 | End: 2024-04-22

## 2024-04-26 ENCOUNTER — HOSPITAL ENCOUNTER (OUTPATIENT)
Dept: CARDIOLOGY | Facility: HOSPITAL | Age: 74
Discharge: HOME | End: 2024-04-26
Payer: MEDICARE

## 2024-04-26 DIAGNOSIS — Z95.818 PRESENCE OF CARDIAC DEVICE: ICD-10-CM

## 2024-04-26 DIAGNOSIS — R55 SYNCOPE AND COLLAPSE: ICD-10-CM

## 2024-04-26 PROCEDURE — 93298 REM INTERROG DEV EVAL SCRMS: CPT | Performed by: INTERNAL MEDICINE

## 2024-04-26 PROCEDURE — 93298 REM INTERROG DEV EVAL SCRMS: CPT

## 2024-05-03 ENCOUNTER — PATIENT OUTREACH (OUTPATIENT)
Dept: CARE COORDINATION | Facility: CLINIC | Age: 74
End: 2024-05-03
Payer: MEDICARE

## 2024-05-03 ENCOUNTER — HOSPITAL ENCOUNTER (OUTPATIENT)
Dept: CARDIOLOGY | Facility: HOSPITAL | Age: 74
Discharge: HOME | End: 2024-05-03
Payer: MEDICARE

## 2024-05-03 DIAGNOSIS — R55 SYNCOPE AND COLLAPSE: ICD-10-CM

## 2024-05-03 DIAGNOSIS — Z95.818 PRESENCE OF CARDIAC DEVICE: ICD-10-CM

## 2024-05-03 NOTE — PROGRESS NOTES
Reviewed current pulmonary progress note.  Appts scheduled in the future with ophthalmology, PCP, and cardiology.  Outreach to patient who reports improvement with Trelegy.     Informed patient this writer is retiring in near future and will transition to another .    Pam OLGUIN RN CCM  RN Care Coordinator  [facility]  Phone [phone]

## 2024-05-29 ENCOUNTER — OFFICE VISIT (OUTPATIENT)
Dept: OPHTHALMOLOGY | Facility: CLINIC | Age: 74
End: 2024-05-29
Payer: MEDICARE

## 2024-05-29 DIAGNOSIS — E11.3511 PROLIFERATIVE DIABETIC RETINOPATHY OF RIGHT EYE WITH MACULAR EDEMA ASSOCIATED WITH TYPE 2 DIABETES MELLITUS (MULTI): ICD-10-CM

## 2024-05-29 DIAGNOSIS — E11.39 NEOVASCULAR GLAUCOMA DUE TO TYPE 2 DIABETES MELLITUS (MULTI): ICD-10-CM

## 2024-05-29 DIAGNOSIS — H34.8120 CENTRAL RETINAL VEIN OCCLUSION WITH MACULAR EDEMA OF LEFT EYE (CMS-HCC): Primary | ICD-10-CM

## 2024-05-29 DIAGNOSIS — H42 NEOVASCULAR GLAUCOMA DUE TO TYPE 2 DIABETES MELLITUS (MULTI): ICD-10-CM

## 2024-05-29 DIAGNOSIS — H43.822 VITREOMACULAR TRACTION SYNDROME OF LEFT EYE: ICD-10-CM

## 2024-05-29 PROCEDURE — 99213 OFFICE O/P EST LOW 20 MIN: CPT | Performed by: OPHTHALMOLOGY

## 2024-05-29 PROCEDURE — 92134 CPTRZ OPH DX IMG PST SGM RTA: CPT | Mod: BILATERAL PROCEDURE | Performed by: OPHTHALMOLOGY

## 2024-05-29 ASSESSMENT — PACHYMETRY
OS_CT(UM): 656
OD_CT(UM): 700

## 2024-05-29 ASSESSMENT — ENCOUNTER SYMPTOMS
HEMATOLOGIC/LYMPHATIC NEGATIVE: 0
GASTROINTESTINAL NEGATIVE: 0
ALLERGIC/IMMUNOLOGIC NEGATIVE: 0
CONSTITUTIONAL NEGATIVE: 0
PSYCHIATRIC NEGATIVE: 0
NEUROLOGICAL NEGATIVE: 0
CARDIOVASCULAR NEGATIVE: 0
RESPIRATORY NEGATIVE: 0
EYES NEGATIVE: 1
ENDOCRINE NEGATIVE: 0
MUSCULOSKELETAL NEGATIVE: 0

## 2024-05-29 ASSESSMENT — VISUAL ACUITY
CORRECTION_TYPE: GLASSES
OS_SC: 20/25
METHOD: SNELLEN - LINEAR
OS_SC+: -1

## 2024-05-29 ASSESSMENT — SLIT LAMP EXAM - LIDS
COMMENTS: NORMAL
COMMENTS: NORMAL

## 2024-05-29 ASSESSMENT — TONOMETRY
IOP_METHOD: GOLDMANN APPLANATION
OD_IOP_MMHG: 12
OS_IOP_MMHG: 16

## 2024-05-29 ASSESSMENT — CUP TO DISC RATIO
OS_RATIO: 0.3
OD_RATIO: 0.5

## 2024-05-29 ASSESSMENT — EXTERNAL EXAM - RIGHT EYE: OD_EXAM: NORMAL

## 2024-05-29 ASSESSMENT — EXTERNAL EXAM - LEFT EYE: OS_EXAM: NORMAL

## 2024-05-29 NOTE — PROGRESS NOTES
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 Following CPC diode laser [place]/Mona, IOP today 2.  - Retina attached all 4 quadrants, with good PRP laser, shallow choroidals peripherally s/p CPC diode laser  - Stop alphagan and cosopt today in setting of hypotony  - Continue atropine BID and prednisolone QID   left eye DOES NOT HAVE PDR      #VMT with cystoid macular edema (CME) OS  VMT is stable, patient' s vision is 20/25 and he is denying metamorphosia  R/b of pars plana vitrectomy (PPV) os were discussed, will defer  surgery for now.         Left eye  has a PCO that is interfering w retina care       today there is an element of VMT OS    consider PPV also OS if warranted  since VA OS is good    4m f/u

## 2024-05-30 ENCOUNTER — PATIENT OUTREACH (OUTPATIENT)
Dept: CARE COORDINATION | Facility: CLINIC | Age: 74
End: 2024-05-30
Payer: MEDICARE

## 2024-05-30 NOTE — PROGRESS NOTES
Attempted outreach to introduce myself to Haile as his new care manager due to the prior nurse retiring.   Left a voice message with my contact information and explanation for my call.  Will continue to follow.  tigist

## 2024-05-31 ENCOUNTER — LAB (OUTPATIENT)
Dept: LAB | Facility: LAB | Age: 74
End: 2024-05-31
Payer: MEDICARE

## 2024-05-31 ENCOUNTER — HOSPITAL ENCOUNTER (OUTPATIENT)
Dept: CARDIOLOGY | Facility: HOSPITAL | Age: 74
Discharge: HOME | End: 2024-05-31
Payer: MEDICARE

## 2024-05-31 DIAGNOSIS — R55 SYNCOPE AND COLLAPSE: ICD-10-CM

## 2024-05-31 DIAGNOSIS — Z79.4 TYPE 2 DIABETES MELLITUS WITH BOTH EYES AFFECTED BY MILD NONPROLIFERATIVE RETINOPATHY WITHOUT MACULAR EDEMA, WITH LONG-TERM CURRENT USE OF INSULIN (MULTI): ICD-10-CM

## 2024-05-31 DIAGNOSIS — Z95.818 PRESENCE OF CARDIAC DEVICE: ICD-10-CM

## 2024-05-31 DIAGNOSIS — I25.118 ATHEROSCLEROTIC HEART DISEASE OF NATIVE CORONARY ARTERY WITH OTHER FORMS OF ANGINA PECTORIS (CMS-HCC): ICD-10-CM

## 2024-05-31 DIAGNOSIS — E11.3293 TYPE 2 DIABETES MELLITUS WITH BOTH EYES AFFECTED BY MILD NONPROLIFERATIVE RETINOPATHY WITHOUT MACULAR EDEMA, WITH LONG-TERM CURRENT USE OF INSULIN (MULTI): ICD-10-CM

## 2024-05-31 LAB
ALBUMIN SERPL BCP-MCNC: 4.3 G/DL (ref 3.4–5)
ALP SERPL-CCNC: 127 U/L (ref 33–136)
ALT SERPL W P-5'-P-CCNC: 19 U/L (ref 10–52)
ANION GAP SERPL CALC-SCNC: 13 MMOL/L (ref 10–20)
AST SERPL W P-5'-P-CCNC: 16 U/L (ref 9–39)
BASOPHILS # BLD AUTO: 0.09 X10*3/UL (ref 0–0.1)
BASOPHILS NFR BLD AUTO: 0.7 %
BILIRUB SERPL-MCNC: 0.7 MG/DL (ref 0–1.2)
BUN SERPL-MCNC: 34 MG/DL (ref 6–23)
CALCIUM SERPL-MCNC: 9.1 MG/DL (ref 8.6–10.3)
CHLORIDE SERPL-SCNC: 106 MMOL/L (ref 98–107)
CHOLEST SERPL-MCNC: 94 MG/DL (ref 0–199)
CHOLESTEROL/HDL RATIO: 3.4
CO2 SERPL-SCNC: 27 MMOL/L (ref 21–32)
CREAT SERPL-MCNC: 1.07 MG/DL (ref 0.5–1.3)
EGFRCR SERPLBLD CKD-EPI 2021: 73 ML/MIN/1.73M*2
EOSINOPHIL # BLD AUTO: 0.7 X10*3/UL (ref 0–0.4)
EOSINOPHIL NFR BLD AUTO: 5.6 %
ERYTHROCYTE [DISTWIDTH] IN BLOOD BY AUTOMATED COUNT: 15.9 % (ref 11.5–14.5)
EST. AVERAGE GLUCOSE BLD GHB EST-MCNC: 183 MG/DL
GLUCOSE SERPL-MCNC: 100 MG/DL (ref 74–99)
HBA1C MFR BLD: 8 %
HCT VFR BLD AUTO: 38.7 % (ref 41–52)
HDLC SERPL-MCNC: 27.6 MG/DL
HGB BLD-MCNC: 12.6 G/DL (ref 13.5–17.5)
IMM GRANULOCYTES # BLD AUTO: 0.08 X10*3/UL (ref 0–0.5)
IMM GRANULOCYTES NFR BLD AUTO: 0.6 % (ref 0–0.9)
LDLC SERPL CALC-MCNC: 54 MG/DL
LYMPHOCYTES # BLD AUTO: 1.7 X10*3/UL (ref 0.8–3)
LYMPHOCYTES NFR BLD AUTO: 13.5 %
MCH RBC QN AUTO: 28.8 PG (ref 26–34)
MCHC RBC AUTO-ENTMCNC: 32.6 G/DL (ref 32–36)
MCV RBC AUTO: 89 FL (ref 80–100)
MONOCYTES # BLD AUTO: 1.14 X10*3/UL (ref 0.05–0.8)
MONOCYTES NFR BLD AUTO: 9.1 %
NEUTROPHILS # BLD AUTO: 8.87 X10*3/UL (ref 1.6–5.5)
NEUTROPHILS NFR BLD AUTO: 70.5 %
NON HDL CHOLESTEROL: 66 MG/DL (ref 0–149)
NRBC BLD-RTO: 0 /100 WBCS (ref 0–0)
PLATELET # BLD AUTO: 309 X10*3/UL (ref 150–450)
POTASSIUM SERPL-SCNC: 4.3 MMOL/L (ref 3.5–5.3)
PROT SERPL-MCNC: 7 G/DL (ref 6.4–8.2)
RBC # BLD AUTO: 4.37 X10*6/UL (ref 4.5–5.9)
SODIUM SERPL-SCNC: 142 MMOL/L (ref 136–145)
TRIGL SERPL-MCNC: 63 MG/DL (ref 0–149)
VLDL: 13 MG/DL (ref 0–40)
WBC # BLD AUTO: 12.6 X10*3/UL (ref 4.4–11.3)

## 2024-05-31 PROCEDURE — 83036 HEMOGLOBIN GLYCOSYLATED A1C: CPT

## 2024-05-31 PROCEDURE — 80053 COMPREHEN METABOLIC PANEL: CPT

## 2024-05-31 PROCEDURE — 85025 COMPLETE CBC W/AUTO DIFF WBC: CPT

## 2024-05-31 PROCEDURE — 36415 COLL VENOUS BLD VENIPUNCTURE: CPT

## 2024-05-31 PROCEDURE — 80061 LIPID PANEL: CPT

## 2024-05-31 PROCEDURE — 93298 REM INTERROG DEV EVAL SCRMS: CPT | Performed by: INTERNAL MEDICINE

## 2024-05-31 PROCEDURE — 93298 REM INTERROG DEV EVAL SCRMS: CPT

## 2024-06-02 DIAGNOSIS — I10 ESSENTIAL HYPERTENSION, BENIGN: ICD-10-CM

## 2024-06-02 RX ORDER — CARVEDILOL 12.5 MG/1
12.5 TABLET ORAL
Qty: 180 TABLET | Refills: 0 | Status: SHIPPED | OUTPATIENT
Start: 2024-06-02

## 2024-06-02 RX ORDER — LOSARTAN POTASSIUM 50 MG/1
50 TABLET ORAL 2 TIMES DAILY
Qty: 180 TABLET | Refills: 0 | Status: SHIPPED | OUTPATIENT
Start: 2024-06-02

## 2024-06-06 ENCOUNTER — OFFICE VISIT (OUTPATIENT)
Dept: PRIMARY CARE | Facility: CLINIC | Age: 74
End: 2024-06-06
Payer: MEDICARE

## 2024-06-06 VITALS
DIASTOLIC BLOOD PRESSURE: 80 MMHG | TEMPERATURE: 96.2 F | BODY MASS INDEX: 39.23 KG/M2 | SYSTOLIC BLOOD PRESSURE: 122 MMHG | HEART RATE: 65 BPM | WEIGHT: 296 LBS | HEIGHT: 73 IN

## 2024-06-06 DIAGNOSIS — I25.118 ATHEROSCLEROTIC HEART DISEASE OF NATIVE CORONARY ARTERY WITH OTHER FORMS OF ANGINA PECTORIS (CMS-HCC): Primary | ICD-10-CM

## 2024-06-06 DIAGNOSIS — I10 ESSENTIAL HYPERTENSION, BENIGN: ICD-10-CM

## 2024-06-06 DIAGNOSIS — E11.3293 TYPE 2 DIABETES MELLITUS WITH BOTH EYES AFFECTED BY MILD NONPROLIFERATIVE RETINOPATHY WITHOUT MACULAR EDEMA, WITH LONG-TERM CURRENT USE OF INSULIN (MULTI): ICD-10-CM

## 2024-06-06 DIAGNOSIS — Z79.4 TYPE 2 DIABETES MELLITUS WITH BOTH EYES AFFECTED BY MILD NONPROLIFERATIVE RETINOPATHY WITHOUT MACULAR EDEMA, WITH LONG-TERM CURRENT USE OF INSULIN (MULTI): ICD-10-CM

## 2024-06-06 DIAGNOSIS — J44.9 CHRONIC OBSTRUCTIVE PULMONARY DISEASE, UNSPECIFIED COPD TYPE (MULTI): ICD-10-CM

## 2024-06-06 PROCEDURE — 3052F HG A1C>EQUAL 8.0%<EQUAL 9.0%: CPT | Performed by: INTERNAL MEDICINE

## 2024-06-06 PROCEDURE — 1157F ADVNC CARE PLAN IN RCRD: CPT | Performed by: INTERNAL MEDICINE

## 2024-06-06 PROCEDURE — 1160F RVW MEDS BY RX/DR IN RCRD: CPT | Performed by: INTERNAL MEDICINE

## 2024-06-06 PROCEDURE — 1159F MED LIST DOCD IN RCRD: CPT | Performed by: INTERNAL MEDICINE

## 2024-06-06 PROCEDURE — 3074F SYST BP LT 130 MM HG: CPT | Performed by: INTERNAL MEDICINE

## 2024-06-06 PROCEDURE — 3008F BODY MASS INDEX DOCD: CPT | Performed by: INTERNAL MEDICINE

## 2024-06-06 PROCEDURE — 1036F TOBACCO NON-USER: CPT | Performed by: INTERNAL MEDICINE

## 2024-06-06 PROCEDURE — 3079F DIAST BP 80-89 MM HG: CPT | Performed by: INTERNAL MEDICINE

## 2024-06-06 PROCEDURE — 1123F ACP DISCUSS/DSCN MKR DOCD: CPT | Performed by: INTERNAL MEDICINE

## 2024-06-06 PROCEDURE — 99213 OFFICE O/P EST LOW 20 MIN: CPT | Performed by: INTERNAL MEDICINE

## 2024-06-06 PROCEDURE — 3048F LDL-C <100 MG/DL: CPT | Performed by: INTERNAL MEDICINE

## 2024-06-06 PROCEDURE — 4010F ACE/ARB THERAPY RXD/TAKEN: CPT | Performed by: INTERNAL MEDICINE

## 2024-06-06 ASSESSMENT — ENCOUNTER SYMPTOMS
DIZZINESS: 0
ARTHRALGIAS: 0
SHORTNESS OF BREATH: 0
CARDIOVASCULAR NEGATIVE: 1
CONSTITUTIONAL NEGATIVE: 1
GASTROINTESTINAL NEGATIVE: 1
WHEEZING: 0

## 2024-06-06 NOTE — PROGRESS NOTES
"Subjective   Patient ID: Haile Mcfarland is a 73 y.o. male who presents for Follow-up (3 mo fu).    HPI   Heart Problem  This is a chronic problem. The current episode started more than 1 year ago. The problem occurs intermittently. The problem has been resolved. Associated symptoms include a visual change. Pertinent negatives include no abdominal pain, chills, coughing, diaphoresis or fatigue. The treatment provided mild relief.   Diabetes  He presents for his follow-up diabetic visit. He has type 2 diabetes mellitus. No MedicAlert identification noted. His disease course has been stable. Associated symptoms include blurred vision, foot paresthesias and visual change. Pertinent negatives for diabetes include no fatigue. Symptoms are stable. Diabetic complications include heart disease and peripheral neuropathy. Risk factors for coronary artery disease include dyslipidemia, hypertension and obesity. Current diabetic treatment includes insulin injections and oral agent (monotherapy). He is compliant with treatment all of the time.   COPD: Patient complains of dyspnea. Symptoms began several years ago. Symptoms chronic dyspnea does worsen with exertion. Sputum is clear in small amounts. Fever has been no fever. Patient uses 1 pillows at night. Patient can walk 100 feet before resting. Patient currently is not on home oxygen therapy.. Respiratory history: chronic bronchitis, COPD, and emphysema, no recent admission.  Review of Systems   Constitutional: Negative.    HENT: Negative.     Respiratory:  Negative for shortness of breath and wheezing.    Cardiovascular: Negative.    Gastrointestinal: Negative.    Musculoskeletal:  Negative for arthralgias.   Skin: Negative.    Neurological:  Negative for dizziness.       Objective   /80 (BP Location: Left arm, Patient Position: Sitting, BP Cuff Size: Adult)   Pulse 65   Temp 35.7 °C (96.2 °F) (Temporal)   Ht 1.854 m (6' 1\")   Wt 134 kg (296 lb)   BMI 39.05 kg/m² "     Physical Exam  Vitals reviewed.   Constitutional:       Appearance: Normal appearance. He is obese.   HENT:      Head: Normocephalic and atraumatic.   Eyes:      Conjunctiva/sclera: Conjunctivae normal.   Cardiovascular:      Rate and Rhythm: Normal rate and regular rhythm.      Pulses: Normal pulses.           Carotid pulses are 2+ on the right side and 2+ on the left side.       Dorsalis pedis pulses are 2+ on the right side and 2+ on the left side.        Posterior tibial pulses are 2+ on the right side and 2+ on the left side.   Pulmonary:      Effort: Pulmonary effort is normal.      Breath sounds: Normal breath sounds.   Abdominal:      General: Abdomen is protuberant.      Palpations: Abdomen is soft.   Musculoskeletal:         General: Normal range of motion.      Cervical back: Neck supple.   Skin:     General: Skin is warm and dry.   Neurological:      General: No focal deficit present.   Psychiatric:         Mood and Affect: Mood normal.         Assessment/Plan   Problem List Items Addressed This Visit             ICD-10-CM    Atherosclerotic heart disease of native coronary artery with other forms of angina pectoris (CMS-HCC) - Primary I25.118    Chronic obstructive pulmonary disease (Multi) J44.9    Essential hypertension, benign I10    Type 2 diabetes mellitus 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 edema (Multi) E11.3299   Pt has moderate to severe COPD. It is progressive disease, use of MDI and proper technique discussed, rinse mouth after inhaled corticosteroids. Notify if progressive dyspnea or cough or lethargy, monitor oxygen saturation if possible with home based pulse oximetry. Avoid hospitalization and call us if any flare ups are about to happen, be sure that annual flu vaccine are uptodate and review need for pneumococcal vaccine. Light to moderate low impact exercises are very helpful and deep breathing  exercises are beneficial in chronic lung diseases.   Pt  does not have any angina lately, aware of using NTG if needed, aware to notify MD if any new chest pains happens. Compliance is appropriate. Statin therapy reviewed, interestingly unfortunately he did not have any hospitalization, hemoglobin A1c of 8 is great for him, further loading with, due to hypoglycemia.  Today we did feet care, there is no callus, there is no deformity, sensory disturbances present, he remains on twice a day Toujeo Premeal insulin.  Carotids has been checked, less than 50% disease, BP readings are excellent, no exacerbation of COPD or cough or mucopurulent sputum.  No angina.  He is a heavyset male patient and his visual acuity remains poor.  He is trying his best to keep his health in a good shape and trying to stay out from the recurrent hospitalization which was happening.  Diabetes education was provided again, list of medication was reviewed and updated, hemoglobin A1c next time, follow-up in 3 months.

## 2024-06-13 ENCOUNTER — HOSPITAL ENCOUNTER (OUTPATIENT)
Dept: CARDIOLOGY | Facility: HOSPITAL | Age: 74
Discharge: HOME | End: 2024-06-13
Payer: MEDICARE

## 2024-06-13 DIAGNOSIS — Z95.818 PRESENCE OF CARDIAC DEVICE: ICD-10-CM

## 2024-06-13 DIAGNOSIS — R55 SYNCOPE AND COLLAPSE: ICD-10-CM

## 2024-06-20 ENCOUNTER — APPOINTMENT (OUTPATIENT)
Dept: CARDIOLOGY | Facility: CLINIC | Age: 74
End: 2024-06-20
Payer: MEDICARE

## 2024-06-20 VITALS
WEIGHT: 296 LBS | BODY MASS INDEX: 39.23 KG/M2 | DIASTOLIC BLOOD PRESSURE: 70 MMHG | SYSTOLIC BLOOD PRESSURE: 120 MMHG | HEIGHT: 73 IN | HEART RATE: 66 BPM

## 2024-06-20 DIAGNOSIS — I65.23 BILATERAL CAROTID ARTERY STENOSIS: ICD-10-CM

## 2024-06-20 DIAGNOSIS — Z45.09 ENCOUNTER FOR LOOP RECORDER AT END OF BATTERY LIFE: Primary | ICD-10-CM

## 2024-06-20 DIAGNOSIS — I25.118 ATHEROSCLEROTIC HEART DISEASE OF NATIVE CORONARY ARTERY WITH OTHER FORMS OF ANGINA PECTORIS (CMS-HCC): ICD-10-CM

## 2024-06-20 DIAGNOSIS — E78.2 MIXED HYPERLIPIDEMIA: ICD-10-CM

## 2024-06-20 DIAGNOSIS — R55 SYNCOPE, UNSPECIFIED SYNCOPE TYPE: ICD-10-CM

## 2024-06-20 DIAGNOSIS — E11.3293 TYPE 2 DIABETES MELLITUS WITH BOTH EYES AFFECTED BY MILD NONPROLIFERATIVE RETINOPATHY WITHOUT MACULAR EDEMA, WITH LONG-TERM CURRENT USE OF INSULIN (MULTI): ICD-10-CM

## 2024-06-20 DIAGNOSIS — Z79.4 TYPE 2 DIABETES MELLITUS WITH BOTH EYES AFFECTED BY MILD NONPROLIFERATIVE RETINOPATHY WITHOUT MACULAR EDEMA, WITH LONG-TERM CURRENT USE OF INSULIN (MULTI): ICD-10-CM

## 2024-06-20 DIAGNOSIS — I10 ESSENTIAL HYPERTENSION, BENIGN: ICD-10-CM

## 2024-06-20 DIAGNOSIS — J44.9 CHRONIC OBSTRUCTIVE PULMONARY DISEASE, UNSPECIFIED COPD TYPE (MULTI): ICD-10-CM

## 2024-06-20 PROCEDURE — 99214 OFFICE O/P EST MOD 30 MIN: CPT | Performed by: NURSE PRACTITIONER

## 2024-06-20 PROCEDURE — 1123F ACP DISCUSS/DSCN MKR DOCD: CPT | Performed by: NURSE PRACTITIONER

## 2024-06-20 PROCEDURE — 3052F HG A1C>EQUAL 8.0%<EQUAL 9.0%: CPT | Performed by: NURSE PRACTITIONER

## 2024-06-20 PROCEDURE — 1036F TOBACCO NON-USER: CPT | Performed by: NURSE PRACTITIONER

## 2024-06-20 PROCEDURE — 93000 ELECTROCARDIOGRAM COMPLETE: CPT | Performed by: INTERNAL MEDICINE

## 2024-06-20 PROCEDURE — 4010F ACE/ARB THERAPY RXD/TAKEN: CPT | Performed by: NURSE PRACTITIONER

## 2024-06-20 PROCEDURE — 1157F ADVNC CARE PLAN IN RCRD: CPT | Performed by: NURSE PRACTITIONER

## 2024-06-20 PROCEDURE — 1160F RVW MEDS BY RX/DR IN RCRD: CPT | Performed by: NURSE PRACTITIONER

## 2024-06-20 PROCEDURE — 3074F SYST BP LT 130 MM HG: CPT | Performed by: NURSE PRACTITIONER

## 2024-06-20 PROCEDURE — 3078F DIAST BP <80 MM HG: CPT | Performed by: NURSE PRACTITIONER

## 2024-06-20 PROCEDURE — 1159F MED LIST DOCD IN RCRD: CPT | Performed by: NURSE PRACTITIONER

## 2024-06-20 PROCEDURE — 3048F LDL-C <100 MG/DL: CPT | Performed by: NURSE PRACTITIONER

## 2024-06-20 PROCEDURE — 3008F BODY MASS INDEX DOCD: CPT | Performed by: NURSE PRACTITIONER

## 2024-06-20 NOTE — H&P (VIEW-ONLY)
"CARDIOLOGY OFFICE VISIT      CHIEF COMPLAINT  Chief Complaint   Patient presents with    Device Check     Patient is here today due the the device reaching GREICA       Chief complaint: \"The battery is dead on this thing.\"  HISTORY OF PRESENT ILLNESS  HPI  History: The patient is a 73-year-old  male who is followed for coronary artery disease status post remote PTCA, COPD, hypertension, hyperlipidemia, type 2 diabetes, CKD stage III, essential tremor, and syncope.  He underwent implantation of a loop recorder in November 23, 2020 and presents to the office today as the device has reached elective replacement indicator as of June 13, 2024.  He denies dizziness, lightheadedness, near or quiana syncope, chest pain, palpitations, shortness of breath, abdominal distention, or lower extremity edema.  He states that he lost his vision after undergoing an eye injection through the [facility] several years ago.  He states he is still able to pass the driving test.  He does have a history of carotid vascular disease status post right carotid endarterectomy.  Past Medical History  History reviewed. No pertinent past medical history.    Social History  Social History     Tobacco Use    Smoking status: Never    Smokeless tobacco: Never   Vaping Use    Vaping status: Never Used   Substance Use Topics    Alcohol use: Never    Drug use: Never       Family History   No family history on file.     Allergies:  Allergies   Allergen Reactions    Penicillins Other and Unknown     From childhood. Unsure of reaction.        Outpatient Medications:  Current Outpatient Medications   Medication Instructions    albuterol 2.5 mg /3 mL (0.083 %) nebulizer solution USE 1 VIAL IN NEBULIZER EVERY 6 HOURS AS NEEDED FOR WHEEZING    amLODIPine (NORVASC) 10 mg, oral, Every evening    aspirin 81 mg EC tablet 1 tablet, oral, Daily    atorvastatin (LIPITOR) 80 mg, oral, Nightly    carvedilol (COREG) 12.5 mg, oral, 2 times daily (morning and " "late afternoon)    clopidogrel (PLAVIX) 75 mg, oral, Daily    fluticasone-umeclidin-vilanter (Trelegy Ellipta) 100-62.5-25 mcg blister with device 1 puff, inhalation, Daily, Rinse mouth with water after use to reduce aftertaste and incidence of candidiasis. Do not swallow.    hydroCHLOROthiazide (HYDRODIURIL) 25 mg, oral, Daily before breakfast    ipratropium-albuteroL (Duo-Neb) 0.5-2.5 mg/3 mL nebulizer solution USE 1 AMPULE IN NEBULIZER EVERY 8 HOURS AS NEEDED FOR WHEEZING FOR SHORTNESS OF BREATH    losartan (COZAAR) 50 mg, oral, 2 times daily    Lumigan 0.01 % ophthalmic solution INSTILL 1 DROP INTO RIGHT EYE AT BEDTIME    magnesium oxide (Mag-Ox) 400 mg (241.3 mg magnesium) tablet 1 tablet, oral, Daily    metFORMIN (GLUCOPHAGE) 1,000 mg, oral, 2 times daily (morning and late afternoon)    nitroglycerin (Nitrostat) 0.4 mg SL tablet DISSOLVE ONE TABLET UNDER THE TONGUE EVERY 5 MINUTES AS NEEDED FOR CHEST PAIN.  DO NOT EXCEED A TOTAL OF 3 DOSES IN 15 MINUTES    NovoLOG FlexPen U-100 Insulin 100 unit/mL (3 mL) pen INJECT 25 UNITS SUBCUTANEOUSLY THREE TIMES DAILY BEFORE MEAL(S)    pen needle, diabetic 32 gauge x 5/32\" needle USE 1 NEEDLE 3 TO 4 TIMES DAILY AS NEEDED    prednisoLONE acetate (Pred-Forte) 1 % ophthalmic suspension 1 drop, Left Eye, 2 times daily    Simbrinza 1-0.2 % drops,suspension ophthalmic suspension 1 drop, Right Eye, 2 times daily    Toujeo SoloStar U-300 Insulin 300 unit/mL (1.5 mL) injection INJECT 50 UNITS SUBCUTANEOUSLY TWICE DAILY          REVIEW OF SYSTEMS  Review of Systems   All other systems reviewed and are negative.        VITALS  Vitals:    06/20/24 0926   BP: 120/70   Pulse: 66       PHYSICAL EXAM  Vitals and nursing note reviewed.   Constitutional:       Appearance: Normal appearance.   HENT:      Head: Normocephalic.   Neck:      Vascular: No JVD.  Right carotid endarterectomy scar is well-healed.    Cardiovascular:      Rate and Rhythm: Normal rate and regular rhythm.      Pulses: " Normal pulses.      Heart sounds: Normal S1 S2, no S3 S4.  No murmurs or rubs.  Pulmonary:      Effort: Pulmonary effort is normal. Respirations regular and nonlabored.     Breath sounds: Clear to auscultation posterior laterally.  Abdominal:      General: Bowel sounds are normal.      Palpations: Abdomen is soft.   Musculoskeletal:         General: Normal range of motion.      Cervical back: Normal range of motion.   Skin:     General: Skin is warm and dry.  Left parasternal loop recorder pocket is well-healed without redness swelling or drainage.  Neurological:      General: No focal deficit present.      Mental Status: Alert and oriented to person, place, and time.      Motor: Motor function is intact.   Psychiatric:         Attention and Perception: Attention and perception normal.         Mood and Affect: Mood and affect normal.         Speech: Speech normal.         Behavior: Behavior normal. Behavior is cooperative.         Thought Content: Thought content normal.         Cognition and Memory: Cognition and memory normal.     Labs and testing: Twelve-lead EKG reveals sinus rhythm with 1 PVC.  QRS durations 130 ms,  ms, QTc 419 ms.  No acute ischemic changes are noted.  2D echocardiogram dated May 31, 2023 revealed an ejection fraction of 65 to 70%, 1+ TR and AR, pulmonary hypertension with right ventricular systolic pressure 47 mmHg, and left atrial enlargement with left atrial size of 4.3 cm.  Lexiscan stress test dated October 27, 2022 revealed an ejection fraction of 60% with no acute ischemic changes.  A small distal anterior septal apical MI is noted.  Carotid duplex scan dated March 7, 2022 revealed significant left internal carotid artery plaque on the right carotid with out signs of stenosis status post endarterectomy.      ASSESSMENT AND PLAN    Clinical impressions:  1.  Syncope of unknown etiology status post loop recorder implant on November 23, 2020 for evaluation of arrhythmic event  (Medtronic reveal Linq) at elective replacement indicator as of June 11, 2024.  2.  Remote TIA status post right carotid endarterectomy with duplex scan dated March 7, 2022 revealing significant left internal carotid artery plaque and no significant stenosis of the right carotid.  3.  Coronary artery disease status post remote PTCA.  Lexiscan stress test dated October 27, 2022 revealed no ischemia with a small distal anterior septal apical MI and normal left ventricular function with an ejection fraction of 60%.  4.  Chronic obstructive pulmonary disease.  5.  Pulmonary hypertension with right ventricular systolic pressure 47 mmHg per 2D echo dated May 31, 2023.  6.  Hypertension, controlled with a blood pressure today 120/70.  7.  Hyperlipidemia on statin.  8.  Loss of vision in the right eye after ophthalmologic procedure.  9.  Diabetes mellitus.  10.  Class II obesity, BMI 39.05.    Recommendations:  1.  The patient request the loop recorder be removed with only local anesthesia.  He states that he needs to be able to drive himself.  Patient will undergo loop recorder removal with Dr. Valiente as soon as possible.  No food to eat or drink after midnight the day of the procedure.  Will take all current medications as prescribed the day of the procedure with a small sip of water.  2.  Follow-ups will be pending the clinical course.  3.  Follow-up in office with Dr. Tatum on September 18, 2024 at 9:30 AM as scheduled.  4.  Obtain a carotid duplex scan on March 14, 2025 at 10 AM.  5.  Follow-up in office with Dr. Tomlinson on March 20, 2025 at 3 PM as scheduled or sooner if needed.  6.  Continue lifestyle modifications as discussed.    Evaluation and note by Carol Briggs CNP  **Please excuse any errors in grammar or translation related to this dictation.  Voice recognition software was utilized to prepare this document.**

## 2024-06-20 NOTE — PROGRESS NOTES
"CARDIOLOGY OFFICE VISIT      CHIEF COMPLAINT  Chief Complaint   Patient presents with    Device Check     Patient is here today due the the device reaching GRECIA       Chief complaint: \"The battery is dead on this thing.\"  HISTORY OF PRESENT ILLNESS  HPI  History: The patient is a 73-year-old  male who is followed for coronary artery disease status post remote PTCA, COPD, hypertension, hyperlipidemia, type 2 diabetes, CKD stage III, essential tremor, and syncope.  He underwent implantation of a loop recorder in November 23, 2020 and presents to the office today as the device has reached elective replacement indicator as of June 13, 2024.  He denies dizziness, lightheadedness, near or quiana syncope, chest pain, palpitations, shortness of breath, abdominal distention, or lower extremity edema.  He states that he lost his vision after undergoing an eye injection through the [facility] several years ago.  He states he is still able to pass the driving test.  He does have a history of carotid vascular disease status post right carotid endarterectomy.  Past Medical History  History reviewed. No pertinent past medical history.    Social History  Social History     Tobacco Use    Smoking status: Never    Smokeless tobacco: Never   Vaping Use    Vaping status: Never Used   Substance Use Topics    Alcohol use: Never    Drug use: Never       Family History   No family history on file.     Allergies:  Allergies   Allergen Reactions    Penicillins Other and Unknown     From childhood. Unsure of reaction.        Outpatient Medications:  Current Outpatient Medications   Medication Instructions    albuterol 2.5 mg /3 mL (0.083 %) nebulizer solution USE 1 VIAL IN NEBULIZER EVERY 6 HOURS AS NEEDED FOR WHEEZING    amLODIPine (NORVASC) 10 mg, oral, Every evening    aspirin 81 mg EC tablet 1 tablet, oral, Daily    atorvastatin (LIPITOR) 80 mg, oral, Nightly    carvedilol (COREG) 12.5 mg, oral, 2 times daily (morning and " "late afternoon)    clopidogrel (PLAVIX) 75 mg, oral, Daily    fluticasone-umeclidin-vilanter (Trelegy Ellipta) 100-62.5-25 mcg blister with device 1 puff, inhalation, Daily, Rinse mouth with water after use to reduce aftertaste and incidence of candidiasis. Do not swallow.    hydroCHLOROthiazide (HYDRODIURIL) 25 mg, oral, Daily before breakfast    ipratropium-albuteroL (Duo-Neb) 0.5-2.5 mg/3 mL nebulizer solution USE 1 AMPULE IN NEBULIZER EVERY 8 HOURS AS NEEDED FOR WHEEZING FOR SHORTNESS OF BREATH    losartan (COZAAR) 50 mg, oral, 2 times daily    Lumigan 0.01 % ophthalmic solution INSTILL 1 DROP INTO RIGHT EYE AT BEDTIME    magnesium oxide (Mag-Ox) 400 mg (241.3 mg magnesium) tablet 1 tablet, oral, Daily    metFORMIN (GLUCOPHAGE) 1,000 mg, oral, 2 times daily (morning and late afternoon)    nitroglycerin (Nitrostat) 0.4 mg SL tablet DISSOLVE ONE TABLET UNDER THE TONGUE EVERY 5 MINUTES AS NEEDED FOR CHEST PAIN.  DO NOT EXCEED A TOTAL OF 3 DOSES IN 15 MINUTES    NovoLOG FlexPen U-100 Insulin 100 unit/mL (3 mL) pen INJECT 25 UNITS SUBCUTANEOUSLY THREE TIMES DAILY BEFORE MEAL(S)    pen needle, diabetic 32 gauge x 5/32\" needle USE 1 NEEDLE 3 TO 4 TIMES DAILY AS NEEDED    prednisoLONE acetate (Pred-Forte) 1 % ophthalmic suspension 1 drop, Left Eye, 2 times daily    Simbrinza 1-0.2 % drops,suspension ophthalmic suspension 1 drop, Right Eye, 2 times daily    Toujeo SoloStar U-300 Insulin 300 unit/mL (1.5 mL) injection INJECT 50 UNITS SUBCUTANEOUSLY TWICE DAILY          REVIEW OF SYSTEMS  Review of Systems   All other systems reviewed and are negative.        VITALS  Vitals:    06/20/24 0926   BP: 120/70   Pulse: 66       PHYSICAL EXAM  Vitals and nursing note reviewed.   Constitutional:       Appearance: Normal appearance.   HENT:      Head: Normocephalic.   Neck:      Vascular: No JVD.  Right carotid endarterectomy scar is well-healed.    Cardiovascular:      Rate and Rhythm: Normal rate and regular rhythm.      Pulses: " Normal pulses.      Heart sounds: Normal S1 S2, no S3 S4.  No murmurs or rubs.  Pulmonary:      Effort: Pulmonary effort is normal. Respirations regular and nonlabored.     Breath sounds: Clear to auscultation posterior laterally.  Abdominal:      General: Bowel sounds are normal.      Palpations: Abdomen is soft.   Musculoskeletal:         General: Normal range of motion.      Cervical back: Normal range of motion.   Skin:     General: Skin is warm and dry.  Left parasternal loop recorder pocket is well-healed without redness swelling or drainage.  Neurological:      General: No focal deficit present.      Mental Status: Alert and oriented to person, place, and time.      Motor: Motor function is intact.   Psychiatric:         Attention and Perception: Attention and perception normal.         Mood and Affect: Mood and affect normal.         Speech: Speech normal.         Behavior: Behavior normal. Behavior is cooperative.         Thought Content: Thought content normal.         Cognition and Memory: Cognition and memory normal.     Labs and testing: Twelve-lead EKG reveals sinus rhythm with 1 PVC.  QRS durations 130 ms,  ms, QTc 419 ms.  No acute ischemic changes are noted.  2D echocardiogram dated May 31, 2023 revealed an ejection fraction of 65 to 70%, 1+ TR and AR, pulmonary hypertension with right ventricular systolic pressure 47 mmHg, and left atrial enlargement with left atrial size of 4.3 cm.  Lexiscan stress test dated October 27, 2022 revealed an ejection fraction of 60% with no acute ischemic changes.  A small distal anterior septal apical MI is noted.  Carotid duplex scan dated March 7, 2022 revealed significant left internal carotid artery plaque on the right carotid with out signs of stenosis status post endarterectomy.      ASSESSMENT AND PLAN    Clinical impressions:  1.  Syncope of unknown etiology status post loop recorder implant on November 23, 2020 for evaluation of arrhythmic event  (Medtronic reveal Linq) at elective replacement indicator as of June 11, 2024.  2.  Remote TIA status post right carotid endarterectomy with duplex scan dated March 7, 2022 revealing significant left internal carotid artery plaque and no significant stenosis of the right carotid.  3.  Coronary artery disease status post remote PTCA.  Lexiscan stress test dated October 27, 2022 revealed no ischemia with a small distal anterior septal apical MI and normal left ventricular function with an ejection fraction of 60%.  4.  Chronic obstructive pulmonary disease.  5.  Pulmonary hypertension with right ventricular systolic pressure 47 mmHg per 2D echo dated May 31, 2023.  6.  Hypertension, controlled with a blood pressure today 120/70.  7.  Hyperlipidemia on statin.  8.  Loss of vision in the right eye after ophthalmologic procedure.  9.  Diabetes mellitus.  10.  Class II obesity, BMI 39.05.    Recommendations:  1.  The patient request the loop recorder be removed with only local anesthesia.  He states that he needs to be able to drive himself.  Patient will undergo loop recorder removal with Dr. Valiente as soon as possible.  No food to eat or drink after midnight the day of the procedure.  Will take all current medications as prescribed the day of the procedure with a small sip of water.  2.  Follow-ups will be pending the clinical course.  3.  Follow-up in office with Dr. Tatum on September 18, 2024 at 9:30 AM as scheduled.  4.  Obtain a carotid duplex scan on March 14, 2025 at 10 AM.  5.  Follow-up in office with Dr. Tomlinson on March 20, 2025 at 3 PM as scheduled or sooner if needed.  6.  Continue lifestyle modifications as discussed.    Evaluation and note by Carol Briggs CNP  **Please excuse any errors in grammar or translation related to this dictation.  Voice recognition software was utilized to prepare this document.**

## 2024-06-20 NOTE — PATIENT INSTRUCTIONS
Loop recorder removal:  Take all current medications as prescribed the day of the procedure with a sip of water.  No food to eat or drink after midnight the day of the procedure.    Patient requests only local anesthesia.  He will need to drive himself home after the procedure.

## 2024-06-23 DIAGNOSIS — J44.9 CHRONIC OBSTRUCTIVE PULMONARY DISEASE, UNSPECIFIED COPD TYPE (MULTI): ICD-10-CM

## 2024-06-23 DIAGNOSIS — E11.51 TYPE 2 DIABETES MELLITUS WITH DIABETIC PERIPHERAL ANGIOPATHY WITHOUT GANGRENE, WITH LONG-TERM CURRENT USE OF INSULIN (MULTI): ICD-10-CM

## 2024-06-23 DIAGNOSIS — Z79.4 TYPE 2 DIABETES MELLITUS WITH DIABETIC PERIPHERAL ANGIOPATHY WITHOUT GANGRENE, WITH LONG-TERM CURRENT USE OF INSULIN (MULTI): ICD-10-CM

## 2024-06-23 RX ORDER — IPRATROPIUM BROMIDE AND ALBUTEROL SULFATE 2.5; .5 MG/3ML; MG/3ML
SOLUTION RESPIRATORY (INHALATION)
Qty: 180 ML | Refills: 0 | Status: SHIPPED | OUTPATIENT
Start: 2024-06-23

## 2024-06-23 RX ORDER — BLOOD SUGAR DIAGNOSTIC
STRIP MISCELLANEOUS
Qty: 250 EACH | Refills: 0 | Status: SHIPPED | OUTPATIENT
Start: 2024-06-23 | End: 2024-06-24

## 2024-06-24 DIAGNOSIS — E11.51 TYPE 2 DIABETES MELLITUS WITH DIABETIC PERIPHERAL ANGIOPATHY WITHOUT GANGRENE, WITH LONG-TERM CURRENT USE OF INSULIN (MULTI): ICD-10-CM

## 2024-06-24 DIAGNOSIS — Z79.4 TYPE 2 DIABETES MELLITUS WITH DIABETIC PERIPHERAL ANGIOPATHY WITHOUT GANGRENE, WITH LONG-TERM CURRENT USE OF INSULIN (MULTI): ICD-10-CM

## 2024-06-24 RX ORDER — BLOOD SUGAR DIAGNOSTIC
STRIP MISCELLANEOUS
Qty: 250 EACH | Refills: 0 | Status: SHIPPED | OUTPATIENT
Start: 2024-06-24

## 2024-06-26 ENCOUNTER — PATIENT OUTREACH (OUTPATIENT)
Dept: CARE COORDINATION | Facility: CLINIC | Age: 74
End: 2024-06-26
Payer: MEDICARE

## 2024-06-26 NOTE — PROGRESS NOTES
"Complex care Management:   Called and spoke with Haile, he states he is doing well.   He is scheduled for a procedure in early July.   Inquired if he knew what procedure he was having, he stated, \"I am having the loop recorder removed\"   At this time he had no questions about his plan of care, medications, and had no questions/needs of this care manager.        Alice DAWKINS, RN, [facility]  Accountable Care Organization  O: [phone]       "

## 2024-06-28 ENCOUNTER — APPOINTMENT (OUTPATIENT)
Dept: OPHTHALMOLOGY | Facility: CLINIC | Age: 74
End: 2024-06-28
Payer: MEDICARE

## 2024-06-28 ENCOUNTER — HOSPITAL ENCOUNTER (OUTPATIENT)
Dept: CARDIOLOGY | Facility: HOSPITAL | Age: 74
Discharge: HOME | End: 2024-06-28
Payer: MEDICARE

## 2024-06-28 DIAGNOSIS — E11.39 NEOVASCULAR GLAUCOMA DUE TO TYPE 2 DIABETES MELLITUS (MULTI): Primary | ICD-10-CM

## 2024-06-28 DIAGNOSIS — Z95.818 PRESENCE OF CARDIAC DEVICE: ICD-10-CM

## 2024-06-28 DIAGNOSIS — Z96.1 PSEUDOPHAKIA: ICD-10-CM

## 2024-06-28 DIAGNOSIS — H42 NEOVASCULAR GLAUCOMA DUE TO TYPE 2 DIABETES MELLITUS (MULTI): Primary | ICD-10-CM

## 2024-06-28 DIAGNOSIS — R55 SYNCOPE AND COLLAPSE: ICD-10-CM

## 2024-06-28 DIAGNOSIS — H34.8120 CENTRAL RETINAL VEIN OCCLUSION WITH MACULAR EDEMA OF LEFT EYE (CMS-HCC): ICD-10-CM

## 2024-06-28 PROCEDURE — 92133 CPTRZD OPH DX IMG PST SGM ON: CPT | Performed by: OPHTHALMOLOGY

## 2024-06-28 PROCEDURE — 99214 OFFICE O/P EST MOD 30 MIN: CPT | Performed by: OPHTHALMOLOGY

## 2024-06-28 PROCEDURE — 92083 EXTENDED VISUAL FIELD XM: CPT | Performed by: OPHTHALMOLOGY

## 2024-06-28 ASSESSMENT — TONOMETRY
IOP_METHOD: GOLDMANN APPLANATION
OS_IOP_MMHG: 16
OD_IOP_MMHG: 13

## 2024-06-28 ASSESSMENT — ENCOUNTER SYMPTOMS
CARDIOVASCULAR NEGATIVE: 0
ALLERGIC/IMMUNOLOGIC NEGATIVE: 0
HEMATOLOGIC/LYMPHATIC NEGATIVE: 0
CONSTITUTIONAL NEGATIVE: 0
PSYCHIATRIC NEGATIVE: 0
MUSCULOSKELETAL NEGATIVE: 0
NEUROLOGICAL NEGATIVE: 0
RESPIRATORY NEGATIVE: 0
EYES NEGATIVE: 1
GASTROINTESTINAL NEGATIVE: 0
ENDOCRINE NEGATIVE: 0

## 2024-06-28 ASSESSMENT — SLIT LAMP EXAM - LIDS
COMMENTS: NORMAL
COMMENTS: NORMAL

## 2024-06-28 ASSESSMENT — REFRACTION_WEARINGRX
OS_ADD: +2.50
OS_SPHERE: +1.00
OS_AXIS: 065
OD_AXIS: 170
OD_SPHERE: +1.00
OD_CYLINDER: -0.75
OS_CYLINDER: -1.50
OD_ADD: +2.50

## 2024-06-28 ASSESSMENT — CUP TO DISC RATIO
OD_RATIO: 0.5
OS_RATIO: 0.3

## 2024-06-28 ASSESSMENT — VISUAL ACUITY
OS_CC: 20/25
METHOD: SNELLEN - LINEAR

## 2024-06-28 ASSESSMENT — PACHYMETRY
OD_CT(UM): 700
OS_CT(UM): 656

## 2024-06-28 ASSESSMENT — EXTERNAL EXAM - LEFT EYE: OS_EXAM: NORMAL

## 2024-06-28 ASSESSMENT — EXTERNAL EXAM - RIGHT EYE: OD_EXAM: NORMAL

## 2024-06-28 NOTE — PROGRESS NOTES
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  IOP had been controlled, but recently had been elevating.  On gonio, there is no active NVA, but the angle is 90% scarred with PAS.  Given that IOP is only 30 mmHg on no meds, there is probably acqueous hyposecretion.  Will try medications first.  Pt with h/o quad bypass, so will avoid beta-blocker       On simbrinza and lumigan OD -> IOP much better.  IOP remains well controlled and no need for surgery     Plan:  cont simbrinza OD BID               cont lumigan OD QHS               f/u 4 months     2.  Pseudophakia (PCIOL) OU:  minimal PCO OD, s/p YAG Cap OS 5/15/23 with dramatic improvement of VA      Plan:  monitor    3.  Proliferative Diabetic Retinopathy OU:  s/p PRP OU.  +CSME OS       Plan:  cont prednisolone and ketorolac OS QID                 As per Dr. Chapa

## 2024-07-09 ENCOUNTER — APPOINTMENT (OUTPATIENT)
Dept: CARDIOLOGY | Facility: HOSPITAL | Age: 74
End: 2024-07-09
Payer: MEDICARE

## 2024-07-09 ENCOUNTER — HOSPITAL ENCOUNTER (OUTPATIENT)
Facility: HOSPITAL | Age: 74
Setting detail: OUTPATIENT SURGERY
Discharge: HOME | End: 2024-07-09
Attending: INTERNAL MEDICINE | Admitting: INTERNAL MEDICINE
Payer: MEDICARE

## 2024-07-09 VITALS
TEMPERATURE: 97.7 F | OXYGEN SATURATION: 97 % | HEART RATE: 75 BPM | DIASTOLIC BLOOD PRESSURE: 77 MMHG | RESPIRATION RATE: 18 BRPM | HEIGHT: 74 IN | SYSTOLIC BLOOD PRESSURE: 156 MMHG | BODY MASS INDEX: 37.63 KG/M2 | WEIGHT: 293.21 LBS

## 2024-07-09 DIAGNOSIS — I65.23 BILATERAL CAROTID ARTERY STENOSIS: ICD-10-CM

## 2024-07-09 DIAGNOSIS — R55 SYNCOPE, UNSPECIFIED SYNCOPE TYPE: Primary | ICD-10-CM

## 2024-07-09 LAB
ANION GAP SERPL CALC-SCNC: 15 MMOL/L (ref 10–20)
APTT PPP: 31 SECONDS (ref 27–38)
BUN SERPL-MCNC: 30 MG/DL (ref 6–23)
CALCIUM SERPL-MCNC: 9.5 MG/DL (ref 8.6–10.3)
CHLORIDE SERPL-SCNC: 103 MMOL/L (ref 98–107)
CO2 SERPL-SCNC: 26 MMOL/L (ref 21–32)
CREAT SERPL-MCNC: 1.11 MG/DL (ref 0.5–1.3)
EGFRCR SERPLBLD CKD-EPI 2021: 70 ML/MIN/1.73M*2
ERYTHROCYTE [DISTWIDTH] IN BLOOD BY AUTOMATED COUNT: 15.8 % (ref 11.5–14.5)
GLUCOSE SERPL-MCNC: 103 MG/DL (ref 74–99)
HCT VFR BLD AUTO: 41.3 % (ref 41–52)
HGB BLD-MCNC: 14 G/DL (ref 13.5–17.5)
INR PPP: 1 (ref 0.9–1.1)
MCH RBC QN AUTO: 29 PG (ref 26–34)
MCHC RBC AUTO-ENTMCNC: 33.9 G/DL (ref 32–36)
MCV RBC AUTO: 86 FL (ref 80–100)
NRBC BLD-RTO: 0 /100 WBCS (ref 0–0)
PLATELET # BLD AUTO: 289 X10*3/UL (ref 150–450)
POTASSIUM SERPL-SCNC: 3.8 MMOL/L (ref 3.5–5.3)
PROTHROMBIN TIME: 11.4 SECONDS (ref 9.8–12.8)
RBC # BLD AUTO: 4.82 X10*6/UL (ref 4.5–5.9)
SODIUM SERPL-SCNC: 140 MMOL/L (ref 136–145)
WBC # BLD AUTO: 13.3 X10*3/UL (ref 4.4–11.3)

## 2024-07-09 PROCEDURE — 85610 PROTHROMBIN TIME: CPT | Performed by: NURSE PRACTITIONER

## 2024-07-09 PROCEDURE — 93005 ELECTROCARDIOGRAM TRACING: CPT | Mod: 59

## 2024-07-09 PROCEDURE — 33286 RMVL SUBQ CAR RHYTHM MNTR: CPT | Performed by: INTERNAL MEDICINE

## 2024-07-09 PROCEDURE — 2500000004 HC RX 250 GENERAL PHARMACY W/ HCPCS (ALT 636 FOR OP/ED): Performed by: NURSE PRACTITIONER

## 2024-07-09 PROCEDURE — 85027 COMPLETE CBC AUTOMATED: CPT | Performed by: NURSE PRACTITIONER

## 2024-07-09 PROCEDURE — 82374 ASSAY BLOOD CARBON DIOXIDE: CPT | Performed by: NURSE PRACTITIONER

## 2024-07-09 PROCEDURE — 36415 COLL VENOUS BLD VENIPUNCTURE: CPT | Performed by: NURSE PRACTITIONER

## 2024-07-09 PROCEDURE — 93010 ELECTROCARDIOGRAM REPORT: CPT | Performed by: INTERNAL MEDICINE

## 2024-07-09 PROCEDURE — 93291 INTERROG DEV EVAL SCRMS IP: CPT | Mod: 59 | Performed by: INTERNAL MEDICINE

## 2024-07-09 PROCEDURE — 2500000005 HC RX 250 GENERAL PHARMACY W/O HCPCS: Performed by: INTERNAL MEDICINE

## 2024-07-09 PROCEDURE — 2500000001 HC RX 250 WO HCPCS SELF ADMINISTERED DRUGS (ALT 637 FOR MEDICARE OP): Performed by: NURSE PRACTITIONER

## 2024-07-09 PROCEDURE — 2720000007 HC OR 272 NO HCPCS: Performed by: INTERNAL MEDICINE

## 2024-07-09 RX ORDER — CHLORHEXIDINE GLUCONATE 40 MG/ML
SOLUTION TOPICAL ONCE
Status: COMPLETED | OUTPATIENT
Start: 2024-07-09 | End: 2024-07-09

## 2024-07-09 RX ORDER — VANCOMYCIN HYDROCHLORIDE 1 G/200ML
1000 INJECTION, SOLUTION INTRAVENOUS ONCE
Status: COMPLETED | OUTPATIENT
Start: 2024-07-09 | End: 2024-07-09

## 2024-07-09 RX ORDER — MUPIROCIN 20 MG/G
1 OINTMENT TOPICAL ONCE
Status: COMPLETED | OUTPATIENT
Start: 2024-07-09 | End: 2024-07-09

## 2024-07-09 RX ORDER — SODIUM CHLORIDE 9 MG/ML
20 INJECTION, SOLUTION INTRAVENOUS CONTINUOUS
Status: DISCONTINUED | OUTPATIENT
Start: 2024-07-09 | End: 2024-07-09

## 2024-07-09 RX ORDER — LIDOCAINE HYDROCHLORIDE 10 MG/ML
INJECTION, SOLUTION EPIDURAL; INFILTRATION; INTRACAUDAL; PERINEURAL AS NEEDED
Status: DISCONTINUED | OUTPATIENT
Start: 2024-07-09 | End: 2024-07-09 | Stop reason: HOSPADM

## 2024-07-09 ASSESSMENT — PAIN - FUNCTIONAL ASSESSMENT: PAIN_FUNCTIONAL_ASSESSMENT: 0-10

## 2024-07-09 ASSESSMENT — PAIN SCALES - GENERAL
PAINLEVEL_OUTOF10: 0 - NO PAIN

## 2024-07-09 ASSESSMENT — COLUMBIA-SUICIDE SEVERITY RATING SCALE - C-SSRS
1. IN THE PAST MONTH, HAVE YOU WISHED YOU WERE DEAD OR WISHED YOU COULD GO TO SLEEP AND NOT WAKE UP?: NO
2. HAVE YOU ACTUALLY HAD ANY THOUGHTS OF KILLING YOURSELF?: NO
6. HAVE YOU EVER DONE ANYTHING, STARTED TO DO ANYTHING, OR PREPARED TO DO ANYTHING TO END YOUR LIFE?: NO

## 2024-07-09 NOTE — NURSING NOTE
Reviewed discharge instructions with patient. All questions answered. Verbalize understanding of incisional care, medications and follow-up appointment.

## 2024-07-09 NOTE — DISCHARGE INSTRUCTIONS
DISCHARGE INSTRUCTIONS FOR LOOP RECORDER EXPLANT    ACTIVITY  DO NOT drive a car for 24 hrs.  DO NOT drive until cleared by your provider if you have had a recent passing out spell or previously restricted from driving.  DO NOT operate power equipment/heavy machinery for 24 hrs.  DO NOT sign any legal documents for 24 hrs.  DO NOT drink alcohol for 24 hrs.    WOUND CARE  DO NOT remove the water resistant dressing.  Keep the dressing clean and dry.  May shower in 24 hrs.   Avoid letting the water directly hit the wound.  May apply ice to the wound for comfort/swelling intermittently 20 mins on and 20 mins off for 24-48 hours.  May take Tylenol or Motrin for further pain relief.    CALL YOUR PHYSICIAN IMMEDIATELY FOR:  Wound edges that are gaping.  Wound that is red, swollen, painful or has foul smelling drainage.  Excessive bright red bleeding or saturated dressing.  Increased pain not controlled by medication.  Chills/fever over 100 degrees F.      Follow up in the office in 1 week for a wound check/dressing removal as scheduled above.  Follow up in 6 months with the electrophysiology service.

## 2024-07-11 LAB
ATRIAL RATE: 78 BPM
P AXIS: 44 DEGREES
P OFFSET: 158 MS
P ONSET: 131 MS
PR INTERVAL: 174 MS
Q ONSET: 218 MS
QRS COUNT: 13 BEATS
QRS DURATION: 126 MS
QT INTERVAL: 368 MS
QTC CALCULATION(BAZETT): 419 MS
QTC FREDERICIA: 401 MS
R AXIS: -20 DEGREES
T AXIS: 68 DEGREES
T OFFSET: 402 MS
VENTRICULAR RATE: 78 BPM

## 2024-07-16 ENCOUNTER — APPOINTMENT (OUTPATIENT)
Dept: CARDIOLOGY | Facility: CLINIC | Age: 74
End: 2024-07-16
Payer: MEDICARE

## 2024-07-16 DIAGNOSIS — R55 SYNCOPE, UNSPECIFIED SYNCOPE TYPE: ICD-10-CM

## 2024-07-16 NOTE — PROGRESS NOTES
Pt. here for wound check of  loop recorder explant  by  Dr Brain Valiente   on  07/9/2024  at [facility]. Mid sternal area is well approximated with no erythema or drainage. Pt. denies any fever or chills. Temp 98.5

## 2024-07-24 DIAGNOSIS — J44.9 CHRONIC OBSTRUCTIVE PULMONARY DISEASE, UNSPECIFIED COPD TYPE (MULTI): ICD-10-CM

## 2024-07-24 RX ORDER — IPRATROPIUM BROMIDE AND ALBUTEROL SULFATE 2.5; .5 MG/3ML; MG/3ML
SOLUTION RESPIRATORY (INHALATION)
Qty: 180 ML | Refills: 3 | Status: SHIPPED | OUTPATIENT
Start: 2024-07-24

## 2024-07-29 DIAGNOSIS — Z79.4 TYPE 2 DIABETES MELLITUS WITH DIABETIC PERIPHERAL ANGIOPATHY WITHOUT GANGRENE, WITH LONG-TERM CURRENT USE OF INSULIN (MULTI): ICD-10-CM

## 2024-07-29 DIAGNOSIS — E11.51 TYPE 2 DIABETES MELLITUS WITH DIABETIC PERIPHERAL ANGIOPATHY WITHOUT GANGRENE, WITH LONG-TERM CURRENT USE OF INSULIN (MULTI): ICD-10-CM

## 2024-07-29 RX ORDER — CLOPIDOGREL BISULFATE 75 MG/1
75 TABLET ORAL DAILY
Qty: 90 TABLET | Refills: 0 | Status: SHIPPED | OUTPATIENT
Start: 2024-07-29

## 2024-07-30 ENCOUNTER — PATIENT OUTREACH (OUTPATIENT)
Dept: CARE COORDINATION | Facility: CLINIC | Age: 74
End: 2024-07-30
Payer: MEDICARE

## 2024-07-30 NOTE — PROGRESS NOTES
Outreached Haile post discharge to assess for any needs or questions at this time.  He has no questions.  He understands what he was inpatient for and states he feels better.   Will continue to follow.    Alice PARDON, RN, [facility]  O: [phone]

## 2024-08-13 ENCOUNTER — CLINICAL SUPPORT (OUTPATIENT)
Dept: PRIMARY CARE | Facility: CLINIC | Age: 74
End: 2024-08-13
Payer: MEDICARE

## 2024-08-13 DIAGNOSIS — R05.9 COUGH, UNSPECIFIED TYPE: ICD-10-CM

## 2024-08-13 PROCEDURE — 87635 SARS-COV-2 COVID-19 AMP PRB: CPT

## 2024-08-14 ENCOUNTER — TELEPHONE (OUTPATIENT)
Dept: PRIMARY CARE | Facility: CLINIC | Age: 74
End: 2024-08-14
Payer: MEDICARE

## 2024-08-14 DIAGNOSIS — J44.1 CHRONIC OBSTRUCTIVE PULMONARY DISEASE WITH ACUTE EXACERBATION (MULTI): Primary | ICD-10-CM

## 2024-08-14 LAB — SARS-COV-2 RNA RESP QL NAA+PROBE: NOT DETECTED

## 2024-08-14 RX ORDER — AZITHROMYCIN 250 MG/1
TABLET, FILM COATED ORAL
Qty: 6 TABLET | Refills: 0 | Status: SHIPPED | OUTPATIENT
Start: 2024-08-14 | End: 2024-08-19

## 2024-08-26 ENCOUNTER — HOSPITAL ENCOUNTER (INPATIENT)
Facility: HOSPITAL | Age: 74
LOS: 3 days | Discharge: HOME | End: 2024-08-29
Attending: EMERGENCY MEDICINE | Admitting: STUDENT IN AN ORGANIZED HEALTH CARE EDUCATION/TRAINING PROGRAM
Payer: MEDICARE

## 2024-08-26 ENCOUNTER — APPOINTMENT (OUTPATIENT)
Dept: CARDIOLOGY | Facility: HOSPITAL | Age: 74
End: 2024-08-26
Payer: MEDICARE

## 2024-08-26 ENCOUNTER — APPOINTMENT (OUTPATIENT)
Dept: RADIOLOGY | Facility: HOSPITAL | Age: 74
End: 2024-08-26
Payer: MEDICARE

## 2024-08-26 DIAGNOSIS — M79.89 SWELLING OF LOWER LEG: ICD-10-CM

## 2024-08-26 DIAGNOSIS — J18.9 PNEUMONIA OF BOTH LUNGS DUE TO INFECTIOUS ORGANISM, UNSPECIFIED PART OF LUNG: Primary | ICD-10-CM

## 2024-08-26 DIAGNOSIS — E11.59 TYPE 2 DIABETES MELLITUS WITH OTHER CIRCULATORY COMPLICATION, WITH LONG-TERM CURRENT USE OF INSULIN (MULTI): ICD-10-CM

## 2024-08-26 DIAGNOSIS — J44.1 COPD EXACERBATION (MULTI): ICD-10-CM

## 2024-08-26 DIAGNOSIS — Z79.4 TYPE 2 DIABETES MELLITUS WITH OTHER CIRCULATORY COMPLICATION, WITH LONG-TERM CURRENT USE OF INSULIN (MULTI): ICD-10-CM

## 2024-08-26 LAB
ALBUMIN SERPL BCP-MCNC: 4 G/DL (ref 3.4–5)
ALP SERPL-CCNC: 112 U/L (ref 33–136)
ALT SERPL W P-5'-P-CCNC: 19 U/L (ref 10–52)
ANION GAP SERPL CALC-SCNC: 12 MMOL/L (ref 10–20)
AST SERPL W P-5'-P-CCNC: 17 U/L (ref 9–39)
ATRIAL RATE: 66 BPM
BASOPHILS # BLD AUTO: 0.09 X10*3/UL (ref 0–0.1)
BASOPHILS NFR BLD AUTO: 0.5 %
BILIRUB SERPL-MCNC: 0.7 MG/DL (ref 0–1.2)
BNP SERPL-MCNC: 71 PG/ML (ref 0–99)
BUN SERPL-MCNC: 37 MG/DL (ref 6–23)
CALCIUM SERPL-MCNC: 9.1 MG/DL (ref 8.6–10.3)
CARDIAC TROPONIN I PNL SERPL HS: 11 NG/L (ref 0–20)
CARDIAC TROPONIN I PNL SERPL HS: 11 NG/L (ref 0–20)
CHLORIDE SERPL-SCNC: 105 MMOL/L (ref 98–107)
CO2 SERPL-SCNC: 27 MMOL/L (ref 21–32)
CREAT SERPL-MCNC: 1.23 MG/DL (ref 0.5–1.3)
EGFRCR SERPLBLD CKD-EPI 2021: 62 ML/MIN/1.73M*2
EOSINOPHIL # BLD AUTO: 0.33 X10*3/UL (ref 0–0.4)
EOSINOPHIL NFR BLD AUTO: 1.7 %
ERYTHROCYTE [DISTWIDTH] IN BLOOD BY AUTOMATED COUNT: 15.6 % (ref 11.5–14.5)
GLUCOSE BLD MANUAL STRIP-MCNC: 223 MG/DL (ref 74–99)
GLUCOSE BLD MANUAL STRIP-MCNC: 331 MG/DL (ref 74–99)
GLUCOSE BLD MANUAL STRIP-MCNC: 90 MG/DL (ref 74–99)
GLUCOSE BLD MANUAL STRIP-MCNC: 99 MG/DL (ref 74–99)
GLUCOSE SERPL-MCNC: 56 MG/DL (ref 74–99)
HCT VFR BLD AUTO: 39.9 % (ref 41–52)
HGB BLD-MCNC: 13.3 G/DL (ref 13.5–17.5)
IMM GRANULOCYTES # BLD AUTO: 0.1 X10*3/UL (ref 0–0.5)
IMM GRANULOCYTES NFR BLD AUTO: 0.5 % (ref 0–0.9)
INR PPP: 1.1 (ref 0.9–1.1)
LACTATE SERPL-SCNC: 1.2 MMOL/L (ref 0.4–2)
LYMPHOCYTES # BLD AUTO: 0.88 X10*3/UL (ref 0.8–3)
LYMPHOCYTES NFR BLD AUTO: 4.4 %
MAGNESIUM SERPL-MCNC: 1.79 MG/DL (ref 1.6–2.4)
MCH RBC QN AUTO: 29 PG (ref 26–34)
MCHC RBC AUTO-ENTMCNC: 33.3 G/DL (ref 32–36)
MCV RBC AUTO: 87 FL (ref 80–100)
MONOCYTES # BLD AUTO: 1.78 X10*3/UL (ref 0.05–0.8)
MONOCYTES NFR BLD AUTO: 9 %
NEUTROPHILS # BLD AUTO: 16.62 X10*3/UL (ref 1.6–5.5)
NEUTROPHILS NFR BLD AUTO: 83.9 %
NRBC BLD-RTO: 0 /100 WBCS (ref 0–0)
P AXIS: 29 DEGREES
P OFFSET: 187 MS
P ONSET: 128 MS
PLATELET # BLD AUTO: 303 X10*3/UL (ref 150–450)
POTASSIUM SERPL-SCNC: 4.1 MMOL/L (ref 3.5–5.3)
PR INTERVAL: 182 MS
PROT SERPL-MCNC: 7.1 G/DL (ref 6.4–8.2)
PROTHROMBIN TIME: 11.9 SECONDS (ref 9.8–12.8)
Q ONSET: 219 MS
QRS COUNT: 11 BEATS
QRS DURATION: 128 MS
QT INTERVAL: 390 MS
QTC CALCULATION(BAZETT): 408 MS
QTC FREDERICIA: 403 MS
R AXIS: -23 DEGREES
RBC # BLD AUTO: 4.58 X10*6/UL (ref 4.5–5.9)
SARS-COV-2 RNA RESP QL NAA+PROBE: NOT DETECTED
SODIUM SERPL-SCNC: 140 MMOL/L (ref 136–145)
T AXIS: 44 DEGREES
T OFFSET: 414 MS
VENTRICULAR RATE: 66 BPM
WBC # BLD AUTO: 19.8 X10*3/UL (ref 4.4–11.3)

## 2024-08-26 PROCEDURE — 71045 X-RAY EXAM CHEST 1 VIEW: CPT

## 2024-08-26 PROCEDURE — 87899 AGENT NOS ASSAY W/OPTIC: CPT | Mod: ELYLAB

## 2024-08-26 PROCEDURE — 82947 ASSAY GLUCOSE BLOOD QUANT: CPT

## 2024-08-26 PROCEDURE — 87635 SARS-COV-2 COVID-19 AMP PRB: CPT | Performed by: EMERGENCY MEDICINE

## 2024-08-26 PROCEDURE — 36415 COLL VENOUS BLD VENIPUNCTURE: CPT | Performed by: EMERGENCY MEDICINE

## 2024-08-26 PROCEDURE — 99223 1ST HOSP IP/OBS HIGH 75: CPT

## 2024-08-26 PROCEDURE — 84484 ASSAY OF TROPONIN QUANT: CPT | Performed by: EMERGENCY MEDICINE

## 2024-08-26 PROCEDURE — 94640 AIRWAY INHALATION TREATMENT: CPT

## 2024-08-26 PROCEDURE — 2500000004 HC RX 250 GENERAL PHARMACY W/ HCPCS (ALT 636 FOR OP/ED)

## 2024-08-26 PROCEDURE — 2500000002 HC RX 250 W HCPCS SELF ADMINISTERED DRUGS (ALT 637 FOR MEDICARE OP, ALT 636 FOR OP/ED)

## 2024-08-26 PROCEDURE — 83880 ASSAY OF NATRIURETIC PEPTIDE: CPT | Performed by: EMERGENCY MEDICINE

## 2024-08-26 PROCEDURE — 71045 X-RAY EXAM CHEST 1 VIEW: CPT | Mod: FOREIGN READ | Performed by: RADIOLOGY

## 2024-08-26 PROCEDURE — 87449 NOS EACH ORGANISM AG IA: CPT | Mod: ELYLAB

## 2024-08-26 PROCEDURE — 99285 EMERGENCY DEPT VISIT HI MDM: CPT | Mod: CS,25

## 2024-08-26 PROCEDURE — 96367 TX/PROPH/DG ADDL SEQ IV INF: CPT

## 2024-08-26 PROCEDURE — 96365 THER/PROPH/DIAG IV INF INIT: CPT

## 2024-08-26 PROCEDURE — 1200000002 HC GENERAL ROOM WITH TELEMETRY DAILY

## 2024-08-26 PROCEDURE — 85025 COMPLETE CBC W/AUTO DIFF WBC: CPT | Performed by: EMERGENCY MEDICINE

## 2024-08-26 PROCEDURE — 2500000002 HC RX 250 W HCPCS SELF ADMINISTERED DRUGS (ALT 637 FOR MEDICARE OP, ALT 636 FOR OP/ED): Performed by: STUDENT IN AN ORGANIZED HEALTH CARE EDUCATION/TRAINING PROGRAM

## 2024-08-26 PROCEDURE — 93005 ELECTROCARDIOGRAM TRACING: CPT

## 2024-08-26 PROCEDURE — 80053 COMPREHEN METABOLIC PANEL: CPT | Performed by: EMERGENCY MEDICINE

## 2024-08-26 PROCEDURE — 2500000001 HC RX 250 WO HCPCS SELF ADMINISTERED DRUGS (ALT 637 FOR MEDICARE OP): Performed by: STUDENT IN AN ORGANIZED HEALTH CARE EDUCATION/TRAINING PROGRAM

## 2024-08-26 PROCEDURE — 2500000002 HC RX 250 W HCPCS SELF ADMINISTERED DRUGS (ALT 637 FOR MEDICARE OP, ALT 636 FOR OP/ED): Performed by: EMERGENCY MEDICINE

## 2024-08-26 PROCEDURE — 83735 ASSAY OF MAGNESIUM: CPT | Performed by: EMERGENCY MEDICINE

## 2024-08-26 PROCEDURE — 2500000004 HC RX 250 GENERAL PHARMACY W/ HCPCS (ALT 636 FOR OP/ED): Performed by: EMERGENCY MEDICINE

## 2024-08-26 PROCEDURE — 96375 TX/PRO/DX INJ NEW DRUG ADDON: CPT

## 2024-08-26 PROCEDURE — 85610 PROTHROMBIN TIME: CPT | Performed by: EMERGENCY MEDICINE

## 2024-08-26 PROCEDURE — 83605 ASSAY OF LACTIC ACID: CPT | Performed by: EMERGENCY MEDICINE

## 2024-08-26 RX ORDER — LEVOFLOXACIN 5 MG/ML
750 INJECTION, SOLUTION INTRAVENOUS ONCE
Status: COMPLETED | OUTPATIENT
Start: 2024-08-26 | End: 2024-08-26

## 2024-08-26 RX ORDER — IPRATROPIUM BROMIDE AND ALBUTEROL SULFATE 2.5; .5 MG/3ML; MG/3ML
3 SOLUTION RESPIRATORY (INHALATION) 3 TIMES DAILY
Status: DISCONTINUED | OUTPATIENT
Start: 2024-08-27 | End: 2024-08-29 | Stop reason: HOSPADM

## 2024-08-26 RX ORDER — ATORVASTATIN CALCIUM 80 MG/1
80 TABLET, FILM COATED ORAL NIGHTLY
Status: DISCONTINUED | OUTPATIENT
Start: 2024-08-26 | End: 2024-08-29 | Stop reason: HOSPADM

## 2024-08-26 RX ORDER — ACETAMINOPHEN 650 MG/1
650 SUPPOSITORY RECTAL EVERY 4 HOURS PRN
Status: DISCONTINUED | OUTPATIENT
Start: 2024-08-26 | End: 2024-08-29 | Stop reason: HOSPADM

## 2024-08-26 RX ORDER — INSULIN GLARGINE 100 [IU]/ML
40 INJECTION, SOLUTION SUBCUTANEOUS 2 TIMES DAILY
Status: DISCONTINUED | OUTPATIENT
Start: 2024-08-26 | End: 2024-08-29 | Stop reason: HOSPADM

## 2024-08-26 RX ORDER — DEXTROSE 50 % IN WATER (D50W) INTRAVENOUS SYRINGE
12.5
Status: DISCONTINUED | OUTPATIENT
Start: 2024-08-26 | End: 2024-08-29 | Stop reason: HOSPADM

## 2024-08-26 RX ORDER — ENOXAPARIN SODIUM 100 MG/ML
40 INJECTION SUBCUTANEOUS EVERY 24 HOURS
Status: DISCONTINUED | OUTPATIENT
Start: 2024-08-26 | End: 2024-08-29 | Stop reason: HOSPADM

## 2024-08-26 RX ORDER — INSULIN LISPRO 100 [IU]/ML
0-10 INJECTION, SOLUTION INTRAVENOUS; SUBCUTANEOUS NIGHTLY
Status: DISCONTINUED | OUTPATIENT
Start: 2024-08-26 | End: 2024-08-27

## 2024-08-26 RX ORDER — CLOPIDOGREL BISULFATE 75 MG/1
75 TABLET ORAL DAILY
Status: DISCONTINUED | OUTPATIENT
Start: 2024-08-27 | End: 2024-08-29 | Stop reason: HOSPADM

## 2024-08-26 RX ORDER — ONDANSETRON 4 MG/1
4 TABLET, FILM COATED ORAL EVERY 8 HOURS PRN
Status: DISCONTINUED | OUTPATIENT
Start: 2024-08-26 | End: 2024-08-29 | Stop reason: HOSPADM

## 2024-08-26 RX ORDER — ACETAMINOPHEN 325 MG/1
650 TABLET ORAL EVERY 4 HOURS PRN
Status: DISCONTINUED | OUTPATIENT
Start: 2024-08-26 | End: 2024-08-29 | Stop reason: HOSPADM

## 2024-08-26 RX ORDER — LEVOFLOXACIN 5 MG/ML
500 INJECTION, SOLUTION INTRAVENOUS EVERY 24 HOURS
Status: DISCONTINUED | OUTPATIENT
Start: 2024-08-27 | End: 2024-08-27

## 2024-08-26 RX ORDER — IPRATROPIUM BROMIDE AND ALBUTEROL SULFATE 2.5; .5 MG/3ML; MG/3ML
3 SOLUTION RESPIRATORY (INHALATION) EVERY 4 HOURS PRN
Status: DISCONTINUED | OUTPATIENT
Start: 2024-08-26 | End: 2024-08-29 | Stop reason: HOSPADM

## 2024-08-26 RX ORDER — INSULIN LISPRO 100 [IU]/ML
0-10 INJECTION, SOLUTION INTRAVENOUS; SUBCUTANEOUS
Status: DISCONTINUED | OUTPATIENT
Start: 2024-08-26 | End: 2024-08-27

## 2024-08-26 RX ORDER — LOSARTAN POTASSIUM 50 MG/1
50 TABLET ORAL DAILY
Status: DISCONTINUED | OUTPATIENT
Start: 2024-08-27 | End: 2024-08-29 | Stop reason: HOSPADM

## 2024-08-26 RX ORDER — LANOLIN ALCOHOL/MO/W.PET/CERES
400 CREAM (GRAM) TOPICAL DAILY
Status: DISCONTINUED | OUTPATIENT
Start: 2024-08-27 | End: 2024-08-29 | Stop reason: HOSPADM

## 2024-08-26 RX ORDER — ONDANSETRON HYDROCHLORIDE 2 MG/ML
4 INJECTION, SOLUTION INTRAVENOUS EVERY 8 HOURS PRN
Status: DISCONTINUED | OUTPATIENT
Start: 2024-08-26 | End: 2024-08-29 | Stop reason: HOSPADM

## 2024-08-26 RX ORDER — INSULIN GLARGINE 300 [IU]/ML
INJECTION, SOLUTION SUBCUTANEOUS
Qty: 36 ML | Refills: 3 | Status: SHIPPED | OUTPATIENT
Start: 2024-08-26

## 2024-08-26 RX ORDER — IPRATROPIUM BROMIDE AND ALBUTEROL SULFATE 2.5; .5 MG/3ML; MG/3ML
3 SOLUTION RESPIRATORY (INHALATION) EVERY 20 MIN
Status: COMPLETED | OUTPATIENT
Start: 2024-08-26 | End: 2024-08-26

## 2024-08-26 RX ORDER — CARVEDILOL 12.5 MG/1
12.5 TABLET ORAL
Status: DISCONTINUED | OUTPATIENT
Start: 2024-08-27 | End: 2024-08-29 | Stop reason: HOSPADM

## 2024-08-26 RX ORDER — DEXTROSE 50 % IN WATER (D50W) INTRAVENOUS SYRINGE
25
Status: DISCONTINUED | OUTPATIENT
Start: 2024-08-26 | End: 2024-08-29 | Stop reason: HOSPADM

## 2024-08-26 RX ORDER — ACETAMINOPHEN 160 MG/5ML
650 SOLUTION ORAL EVERY 4 HOURS PRN
Status: DISCONTINUED | OUTPATIENT
Start: 2024-08-26 | End: 2024-08-29 | Stop reason: HOSPADM

## 2024-08-26 RX ORDER — MAGNESIUM SULFATE HEPTAHYDRATE 40 MG/ML
2 INJECTION, SOLUTION INTRAVENOUS ONCE
Status: COMPLETED | OUTPATIENT
Start: 2024-08-26 | End: 2024-08-26

## 2024-08-26 RX ORDER — AMLODIPINE BESYLATE 5 MG/1
10 TABLET ORAL EVERY EVENING
Status: DISCONTINUED | OUTPATIENT
Start: 2024-08-27 | End: 2024-08-29 | Stop reason: HOSPADM

## 2024-08-26 RX ORDER — ASPIRIN 81 MG/1
81 TABLET ORAL DAILY
Status: DISCONTINUED | OUTPATIENT
Start: 2024-08-27 | End: 2024-08-29 | Stop reason: HOSPADM

## 2024-08-26 RX ORDER — IPRATROPIUM BROMIDE AND ALBUTEROL SULFATE 2.5; .5 MG/3ML; MG/3ML
3 SOLUTION RESPIRATORY (INHALATION)
Status: DISCONTINUED | OUTPATIENT
Start: 2024-08-26 | End: 2024-08-26

## 2024-08-26 SDOH — SOCIAL STABILITY: SOCIAL INSECURITY: DO YOU FEEL ANYONE HAS EXPLOITED OR TAKEN ADVANTAGE OF YOU FINANCIALLY OR OF YOUR PERSONAL PROPERTY?: NO

## 2024-08-26 SDOH — SOCIAL STABILITY: SOCIAL INSECURITY: DO YOU FEEL UNSAFE GOING BACK TO THE PLACE WHERE YOU ARE LIVING?: NO

## 2024-08-26 SDOH — SOCIAL STABILITY: SOCIAL INSECURITY: WERE YOU ABLE TO COMPLETE ALL THE BEHAVIORAL HEALTH SCREENINGS?: YES

## 2024-08-26 SDOH — SOCIAL STABILITY: SOCIAL INSECURITY: HAS ANYONE EVER THREATENED TO HURT YOUR FAMILY OR YOUR PETS?: NO

## 2024-08-26 SDOH — SOCIAL STABILITY: SOCIAL INSECURITY: DOES ANYONE TRY TO KEEP YOU FROM HAVING/CONTACTING OTHER FRIENDS OR DOING THINGS OUTSIDE YOUR HOME?: NO

## 2024-08-26 SDOH — SOCIAL STABILITY: SOCIAL INSECURITY: HAVE YOU HAD ANY THOUGHTS OF HARMING ANYONE ELSE?: NO

## 2024-08-26 SDOH — SOCIAL STABILITY: SOCIAL INSECURITY: ABUSE: ADULT

## 2024-08-26 SDOH — SOCIAL STABILITY: SOCIAL INSECURITY: ARE YOU OR HAVE YOU BEEN THREATENED OR ABUSED PHYSICALLY, EMOTIONALLY, OR SEXUALLY BY ANYONE?: NO

## 2024-08-26 SDOH — SOCIAL STABILITY: SOCIAL INSECURITY: ARE THERE ANY APPARENT SIGNS OF INJURIES/BEHAVIORS THAT COULD BE RELATED TO ABUSE/NEGLECT?: NO

## 2024-08-26 SDOH — SOCIAL STABILITY: SOCIAL INSECURITY: HAVE YOU HAD THOUGHTS OF HARMING ANYONE ELSE?: NO

## 2024-08-26 ASSESSMENT — COGNITIVE AND FUNCTIONAL STATUS - GENERAL
PATIENT BASELINE BEDBOUND: NO
WALKING IN HOSPITAL ROOM: A LITTLE
DRESSING REGULAR LOWER BODY CLOTHING: A LITTLE
MOVING FROM LYING ON BACK TO SITTING ON SIDE OF FLAT BED WITH BEDRAILS: A LITTLE
TURNING FROM BACK TO SIDE WHILE IN FLAT BAD: A LITTLE
DAILY ACTIVITIY SCORE: 23
CLIMB 3 TO 5 STEPS WITH RAILING: A LOT
MOBILITY SCORE: 18
DAILY ACTIVITIY SCORE: 23
WALKING IN HOSPITAL ROOM: A LITTLE
MOBILITY SCORE: 20
CLIMB 3 TO 5 STEPS WITH RAILING: A LOT
DRESSING REGULAR LOWER BODY CLOTHING: A LITTLE
MOVING TO AND FROM BED TO CHAIR: A LITTLE
TURNING FROM BACK TO SIDE WHILE IN FLAT BAD: A LITTLE

## 2024-08-26 ASSESSMENT — PATIENT HEALTH QUESTIONNAIRE - PHQ9
SUM OF ALL RESPONSES TO PHQ9 QUESTIONS 1 & 2: 0
2. FEELING DOWN, DEPRESSED OR HOPELESS: NOT AT ALL
1. LITTLE INTEREST OR PLEASURE IN DOING THINGS: NOT AT ALL

## 2024-08-26 ASSESSMENT — ACTIVITIES OF DAILY LIVING (ADL)
HEARING - LEFT EAR: FUNCTIONAL
ADEQUATE_TO_COMPLETE_ADL: YES
BATHING: INDEPENDENT
GROOMING: INDEPENDENT
PATIENT'S MEMORY ADEQUATE TO SAFELY COMPLETE DAILY ACTIVITIES?: YES
FEEDING YOURSELF: INDEPENDENT
LACK_OF_TRANSPORTATION: NO
DRESSING YOURSELF: INDEPENDENT
WALKS IN HOME: INDEPENDENT
JUDGMENT_ADEQUATE_SAFELY_COMPLETE_DAILY_ACTIVITIES: YES
TOILETING: INDEPENDENT
HEARING - RIGHT EAR: FUNCTIONAL

## 2024-08-26 ASSESSMENT — LIFESTYLE VARIABLES
HAVE PEOPLE ANNOYED YOU BY CRITICIZING YOUR DRINKING: NO
AUDIT-C TOTAL SCORE: 0
SKIP TO QUESTIONS 9-10: 1
HOW MANY STANDARD DRINKS CONTAINING ALCOHOL DO YOU HAVE ON A TYPICAL DAY: PATIENT DOES NOT DRINK
AUDIT-C TOTAL SCORE: 0
EVER FELT BAD OR GUILTY ABOUT YOUR DRINKING: NO
HOW OFTEN DO YOU HAVE 6 OR MORE DRINKS ON ONE OCCASION: NEVER
TOTAL SCORE: 0
HOW OFTEN DO YOU HAVE A DRINK CONTAINING ALCOHOL: NEVER
HAVE YOU EVER FELT YOU SHOULD CUT DOWN ON YOUR DRINKING: NO
EVER HAD A DRINK FIRST THING IN THE MORNING TO STEADY YOUR NERVES TO GET RID OF A HANGOVER: NO

## 2024-08-26 ASSESSMENT — PAIN SCALES - GENERAL
PAINLEVEL_OUTOF10: 0 - NO PAIN
PAINLEVEL_OUTOF10: 0 - NO PAIN

## 2024-08-26 ASSESSMENT — COLUMBIA-SUICIDE SEVERITY RATING SCALE - C-SSRS
2. HAVE YOU ACTUALLY HAD ANY THOUGHTS OF KILLING YOURSELF?: NO
1. IN THE PAST MONTH, HAVE YOU WISHED YOU WERE DEAD OR WISHED YOU COULD GO TO SLEEP AND NOT WAKE UP?: NO
6. HAVE YOU EVER DONE ANYTHING, STARTED TO DO ANYTHING, OR PREPARED TO DO ANYTHING TO END YOUR LIFE?: NO
1. IN THE PAST MONTH, HAVE YOU WISHED YOU WERE DEAD OR WISHED YOU COULD GO TO SLEEP AND NOT WAKE UP?: NO
6. HAVE YOU EVER DONE ANYTHING, STARTED TO DO ANYTHING, OR PREPARED TO DO ANYTHING TO END YOUR LIFE?: NO
2. HAVE YOU ACTUALLY HAD ANY THOUGHTS OF KILLING YOURSELF?: NO

## 2024-08-26 ASSESSMENT — PAIN - FUNCTIONAL ASSESSMENT: PAIN_FUNCTIONAL_ASSESSMENT: 0-10

## 2024-08-26 NOTE — H&P
History Of Present Illness  Haile Mcfarland is a 73 y.o. male presenting with worsening shortness of breath.  Patient with history of COPD does not wear supplemental O2 at home.  Patient states he has been having increased shortness of breath and cough for the past month.  He was treated by his PCP 10 days ago and finishes antibiotic however symptoms continued.  Patient states he has an exacerbation of COPD once a year.  Patient currently on 2 L of oxygen..  Patient states he does have a productive cough.  Denies chest pain.  He denies abdominal pain, nausea or vomiting.  He denies fever or chills or being around anyone ill.  He also had 2 negative COVID test in the past week.  Glucose on admission 56.  WBCs 19.8, CXR shows bilateral pulmonary infiltrates right greater than left.  Patient being admitted for further evaluation and treatment.     Past Medical History  He has a past medical history of Chronic kidney disease, COPD (chronic obstructive pulmonary disease) (Multi), Coronary artery disease, Diabetes mellitus (Multi), Hyperlipidemia, Hypertension, and TIA (transient ischemic attack).    Surgical History  He has a past surgical history that includes Other surgical history (04/24/2019); Other surgical history (10/21/2021); Other surgical history (10/21/2021); Other surgical history (10/21/2021); Other surgical history (10/21/2021); Other surgical history (10/21/2021); Other surgical history (10/21/2021); Other surgical history (10/21/2021); CT angio neck (02/03/2021); Cataract extraction; and Cardiac electrophysiology procedure (Left, 7/9/2024).     Social History  He reports that he has never smoked. He has never used smokeless tobacco. He reports that he does not drink alcohol and does not use drugs.    Family History  No family history on file.     Allergies  Penicillins    Review of Systems   Review of systems: 10 system were reviewed and were negative except what was mentioned in history of present  illness    Physical Exam  Constitutional:       Appearance: He is obese. He is ill-appearing.   HENT:      Head: Normocephalic.      Mouth/Throat:      Mouth: Mucous membranes are moist.   Eyes:      Pupils: Pupils are equal, round, and reactive to light.   Cardiovascular:      Rate and Rhythm: Normal rate and regular rhythm.      Heart sounds: Normal heart sounds, S1 normal and S2 normal.   Pulmonary:      Effort: Pulmonary effort is normal.      Breath sounds: Rhonchi present.      Comments: 2L/min NC  Abdominal:      General: Bowel sounds are normal.      Palpations: Abdomen is soft.   Musculoskeletal:         General: Normal range of motion.      Cervical back: Neck supple.   Skin:     General: Skin is warm.   Neurological:      Mental Status: He is alert and oriented to person, place, and time.      Motor: Weakness present.   Psychiatric:         Mood and Affect: Mood normal.         Behavior: Behavior normal.          Last Recorded Vitals  /89 (BP Location: Left arm, Patient Position: Sitting)   Pulse 80   Temp 36.6 °C (97.9 °F) (Temporal)   Resp 18   Wt 127 kg (280 lb)   SpO2 96%     Relevant Results  CBC:   Results from last 7 days   Lab Units 08/26/24  0855   WBC AUTO x10*3/uL 19.8*   RBC AUTO x10*6/uL 4.58   HEMOGLOBIN g/dL 13.3*   HEMATOCRIT % 39.9*   MCV fL 87   MCH pg 29.0   MCHC g/dL 33.3   RDW % 15.6*   PLATELETS AUTO x10*3/uL 303     CMP:    Results from last 7 days   Lab Units 08/26/24  0855   SODIUM mmol/L 140   POTASSIUM mmol/L 4.1   CHLORIDE mmol/L 105   CO2 mmol/L 27   BUN mg/dL 37*   CREATININE mg/dL 1.23   GLUCOSE mg/dL 56*   PROTEIN TOTAL g/dL 7.1   CALCIUM mg/dL 9.1   BILIRUBIN TOTAL mg/dL 0.7   ALK PHOS U/L 112   AST U/L 17   ALT U/L 19     BMP:    Results from last 7 days   Lab Units 08/26/24  0855   SODIUM mmol/L 140   POTASSIUM mmol/L 4.1   CHLORIDE mmol/L 105   CO2 mmol/L 27   BUN mg/dL 37*   CREATININE mg/dL 1.23   CALCIUM mg/dL 9.1   GLUCOSE mg/dL 56*      Magnesium:  Results from last 7 days   Lab Units 08/26/24  0855   MAGNESIUM mg/dL 1.79     Troponin:    Results from last 7 days   Lab Units 08/26/24  1016 08/26/24  0855   TROPHS ng/L 11 11     BNP:   Results from last 7 days   Lab Units 08/26/24  0855   BNP pg/mL 71     Lipid Panel:    Imagining  ECG 12 lead  Normal sinus rhythm  Left ventricular hypertrophy with QRS widening  Abnormal ECG  When compared with ECG of 09-JUL-2024 06:48,  No significant change was found  XR chest 1 view  Narrative: STUDY:  Chest Radiograph;  8/26/2024 9:30 AM.  INDICATION:  Chest pain.  COMPARISON:  Chest radiograph and CTA chest 5/29/2023.  ACCESSION NUMBER(S):  [id number]  ORDERING CLINICIAN:  ADALI AMAYA  TECHNIQUE:  Frontal chest was obtained at 09:29 hours.  FINDINGS:  CARDIOMEDIASTINAL SILHOUETTE:  Cardiomediastinal silhouette is normal in size and configuration.     LUNGS:  There are bilateral pulmonary infiltrates most pronounced on the  right.  There is blunting the costophrenic angles and small pleural  effusions cannot be excluded.  ABDOMEN:  No remarkable upper abdominal findings.     BONES:  No acute osseous changes.  Impression: Bilateral pulmonary infiltrates right greater than left.  Signed by Rafiq Templeton MD       Assessment/Plan    Acute hypoxic respiratory failure  Pneumonia  COPD exacerbation  T2DM  Loop recorder explant July 2024  HTN/HLD  CAD  Morbid obesity    -Continue IV Levaquin  -Continue Solu-Medrol and DuoNebs  -CBC BMP daily  -Continue to wean O2  -Resume home meds  -Urine for Legionella and strep  -BMI 35.0, encouraged healthy lifestyle change  -Diabetic diet with Accu-Cheks and sliding scale  -Most recent A1c, 8.0%  -PT/OT evaluation  -TCC for discharge planning      DVTp: Lovenox subcu    PLAN: Home with wife    Vanessa Aguilar, APRN-CNP    Plan of care was discussed extensively with patient.  Patient verbalized understanding through teach back method.  All question and concerns addressed  upon examination.    Of note, this documentation is completed using the Dragon Dictation system (voice recognition software). There may be spelling and/or grammatical errors that were not corrected prior to final submission.

## 2024-08-26 NOTE — ED PROVIDER NOTES
HPI   Chief Complaint   Patient presents with    Shortness of Breath         History provided by:  Patient and relative  History of Present Illness:  73-year-old male presents with increasing shortness of breath of the past couple of days.  No fever or chills.  Mild cough.  No chest pain.  No back or flank pain.  No lightheadedness or dizziness.  No nausea or vomiting.  No leg swelling.  Patient has a known history of COPD but his inhalers are not helping.  Symptoms are worse with exertion.  No sick contacts.  No trauma or travel.      PMFSH:   As per HPI, otherwise nurses notes reviewed in EMR.    Past Medical History:   Active Ambulatory Problems     Diagnosis Date Noted    Bilateral carotid artery stenosis 06/04/2018    Atherosclerotic heart disease of native coronary artery with other forms of angina pectoris (CMS-[facility])     Central retinal vein occlusion with macular edema of left eye (CMS-[facility]) 06/08/2023    Chronic obstructive pulmonary disease (Multi) 06/04/2018    Essential hypertension, benign 02/01/2018    GERD without esophagitis 04/07/2019    Mixed hyperlipidemia 05/04/2018    Proliferative diabetic retinopathy of right eye with macular edema associated with type 2 diabetes mellitus (Multi) 06/08/2023    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) 09/12/2023    Type 2 diabetes mellitus 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 edema (Multi) 05/02/2016    Vitreomacular traction syndrome of left eye 09/12/2023    Syncope 06/20/2024     Resolved Ambulatory Problems     Diagnosis Date Noted    No Resolved Ambulatory Problems     Past Medical History:   Diagnosis Date    Chronic kidney disease     COPD (chronic obstructive pulmonary disease) (Multi)     Coronary artery disease     Diabetes mellitus (Multi)     Hyperlipidemia     Hypertension     TIA (transient ischemic attack)       Past Surgical History:   Past Surgical History:   Procedure Laterality Date    CARDIAC  ELECTROPHYSIOLOGY PROCEDURE Left 7/9/2024    Procedure: Loop Recorder Explant;  Surgeon: Brain Valiente MD;  Location: [facility];  Service: Electrophysiology;  Laterality: Left;    CATARACT EXTRACTION      CT ANGIO NECK  02/03/2021    CT NECK ANGIO W AND WO IV CONTRAST 2/3/2021 [facility] CL[facility]ICAL LEGACY    OTHER SURGICAL HISTORY  04/24/2019    Cervical laminectomy    OTHER SURGICAL HISTORY  10/21/2021    Knee replacement    OTHER SURGICAL HISTORY  10/21/2021    Percutaneous transluminal coronary angioplasty    OTHER SURGICAL HISTORY  10/21/2021    Kidney surgery    OTHER SURGICAL HISTORY  10/21/2021    Neck surgery    OTHER SURGICAL HISTORY  10/21/2021    Colonoscopy    OTHER SURGICAL HISTORY  10/21/2021    Tonsillectomy    OTHER SURGICAL HISTORY  10/21/2021    PTA carotid      Family History: No family history on file.   Social History:    Social History     Socioeconomic History    Marital status:      Spouse name: Not on file    Number of children: Not on file    Years of education: Not on file    Highest education level: Not on file   Occupational History    Not on file   Tobacco Use    Smoking status: Never    Smokeless tobacco: Never   Vaping Use    Vaping status: Never Used   Substance and Sexual Activity    Alcohol use: Never    Drug use: Never    Sexual activity: Not on file   Other Topics Concern    Not on file   Social History Narrative    Not on file     Social Determinants of Health     Financial Resource Strain: Low Risk  (4/10/2023)    Overall Financial Resource Strain (CARDIA)     Difficulty of Paying Living Expenses: Not hard at all   Food Insecurity: Unknown (6/2/2023)    Hunger Vital Sign     Worried About Running Out of Food in the Last Year: Never true     Ran Out of Food in the Last Year: Not on file   Transportation Needs: No Transportation Needs (6/2/2023)    PRAPARE - Transportation     Lack of Transportation (Medical): No     Lack of Transportation (Non-Medical): No   Physical  Activity: Not on file   Stress: Not on file   Social Connections: Unknown (6/2/2023)    Social Connection and Isolation Panel [NHANES]     Frequency of Communication with Friends and Family: Not on file     Frequency of Social Gatherings with Friends and Family: Not on file     Attends Tenriism Services: Not on file     Active Member of Clubs or Organizations: Not on file     Attends Club or Organization Meetings: Not on file     Marital Status:    Intimate Partner Violence: Not on file   Housing Stability: Unknown (6/2/2023)    Housing Stability Vital Sign     Unable to Pay for Housing in the Last Year: No     Number of Places Lived in the Last Year: Not on file     Unstable Housing in the Last Year: Not on file              Patient History   Past Medical History:   Diagnosis Date    Chronic kidney disease     COPD (chronic obstructive pulmonary disease) (Multi)     Coronary artery disease     Diabetes mellitus (Multi)     Hyperlipidemia     Hypertension     TIA (transient ischemic attack)      Past Surgical History:   Procedure Laterality Date    CARDIAC ELECTROPHYSIOLOGY PROCEDURE Left 7/9/2024    Procedure: Loop Recorder Explant;  Surgeon: Brain Valiente MD;  Location: [facility];  Service: Electrophysiology;  Laterality: Left;    CATARACT EXTRACTION      CT ANGIO NECK  02/03/2021    CT NECK ANGIO W AND WO IV CONTRAST 2/3/2021 [facility] CLINICAL LEGACY    OTHER SURGICAL HISTORY  04/24/2019    Cervical laminectomy    OTHER SURGICAL HISTORY  10/21/2021    Knee replacement    OTHER SURGICAL HISTORY  10/21/2021    Percutaneous transluminal coronary angioplasty    OTHER SURGICAL HISTORY  10/21/2021    Kidney surgery    OTHER SURGICAL HISTORY  10/21/2021    Neck surgery    OTHER SURGICAL HISTORY  10/21/2021    Colonoscopy    OTHER SURGICAL HISTORY  10/21/2021    Tonsillectomy    OTHER SURGICAL HISTORY  10/21/2021    PTA carotid     No family history on file.  Social History     Tobacco Use    Smoking  status: Never    Smokeless tobacco: Never   Vaping Use    Vaping status: Never Used   Substance Use Topics    Alcohol use: Never    Drug use: Never       Physical Exam   ED Triage Vitals   Temp Pulse Resp BP   -- -- -- --      SpO2 Temp src Heart Rate Source Patient Position   -- -- -- --      BP Location FiO2 (%)     -- --       Physical Exam  Physical Exam:    ED Triage Vitals   Temp Pulse Resp BP   -- -- -- --      SpO2 Temp src Heart Rate Source Patient Position   -- -- -- --      BP Location FiO2 (%)     -- --         Constitutional: Vital signs per nursing notes.  Well developed, well nourished.  Mild acute distress.    Psychiatric: alert and oriented to person, place, and time; no abnormalities of mood or affect; memory intact  Eyes: PERRL; conjunctivae and lids normal; EOMI  ENT: otoscopic exam of external canal and TM´s normal; nasal mucosa, turbinates, and septum normal; mouth, tongue, and pharynx normal; pharynx without edema, exudate, or injection  Respiratory: normal respiratory effort and excursion; no rales, rhonchi, or wheezes; equal but diminished air entry  Cardiovascular: regular rate and rhythm; no murmurs, rubs or gallops; symmetric pulses; no edema; normal capillary refill; distal pulses present  Neurological: normal speech; CN II-XII grossly intact; normal motor and sensory function; no nystagmus; no pronator drift  GI: no masses, tenderness, rebound or guarding; no palpable, pulsatile mass; no organomegaly; no hernia; normal bowel sounds; (-) Wasserman´s sign; (-) McBurney´s sign; (-) CVA tenderness  Lymphatic: no adenopathy of neck  Musculoskeletal: normal digits and nails; no gross tendon or ligament injury; normal to palpation; normal strength/tone; neurovascular status intact; (-) Rao´s sign; (-) straight leg raise  Skin: normal to inspection; normal to palpation; no rash  GCS: 15      ED Course & MDM   Diagnoses as of 08/26/24 1201   Pneumonia of both lungs due to infectious organism,  unspecified part of lung   COPD exacerbation (Multi)                 No data recorded     Yeyo Coma Scale Score: 15 (08/26/24 0837 : Afshan Tristan RN)                           Medical Decision Making  Medical Decision Making:    Differential Diagnoses Considered: ACS, arrhythmia, COPD, CHF, pneumonia, COVID     EKG: My interpretation of EKG is normal sinus rhythm at 73 bpm with nonspecific ST-T changes             My interpretation of second EKG is normal sinus rhythm at 66 bpm with nonspecific ST-T changes    Labs Reviewed   CBC WITH AUTO DIFFERENTIAL - Abnormal       Result Value    WBC 19.8 (*)     nRBC 0.0      RBC 4.58      Hemoglobin 13.3 (*)     Hematocrit 39.9 (*)     MCV 87      MCH 29.0      MCHC 33.3      RDW 15.6 (*)     Platelets 303      Neutrophils % 83.9      Immature Granulocytes %, Automated 0.5      Lymphocytes % 4.4      Monocytes % 9.0      Eosinophils % 1.7      Basophils % 0.5      Neutrophils Absolute 16.62 (*)     Immature Granulocytes Absolute, Automated 0.10      Lymphocytes Absolute 0.88      Monocytes Absolute 1.78 (*)     Eosinophils Absolute 0.33      Basophils Absolute 0.09     COMPREHENSIVE METABOLIC PANEL - Abnormal    Glucose 56 (*)     Sodium 140      Potassium 4.1      Chloride 105      Bicarbonate 27      Anion Gap 12      Urea Nitrogen 37 (*)     Creatinine 1.23      eGFR 62      Calcium 9.1      Albumin 4.0      Alkaline Phosphatase 112      Total Protein 7.1      AST 17      Bilirubin, Total 0.7      ALT 19     MAGNESIUM - Normal    Magnesium 1.79     B-TYPE NATRIURETIC PEPTIDE - Normal    BNP 71      Narrative:        <100 pg/mL - Heart failure unlikely  100-299 pg/mL - Intermediate probability of acute heart                  failure exacerbation. Correlate with clinical                  context and patient history.    >=300 pg/mL - Heart Failure likely. Correlate with clinical                  context and patient history.    BNP testing is performed using different  testing methodology at [facility] than at [facility]. Direct result comparisons should only be made within the same method.      LACTATE - Normal    Lactate 1.2      Narrative:     Venipuncture immediately after or during the administration of Metamizole may lead to falsely low results. Testing should be performed immediately  prior to Metamizole dosing.   PROTIME-INR - Normal    Protime 11.9      INR 1.1     SARS-COV-2 PCR - Normal    Coronavirus 2019, PCR Not Detected      Narrative:     This assay has received FDA Emergency Use Authorization (EUA) and is only authorized for the duration of time that circumstances exist to justify the authorization of the emergency use of in vitro diagnostic tests for the detection of SARS-CoV-2 virus and/or diagnosis of COVID-19 infection under section 564(b)(1) of the Act, 21 U.S.C. 360bbb-3(b)(1). This assay is an in vitro diagnostic nucleic acid amplification test for the qualitative detection of SARS-CoV-2 from nasopharyngeal specimens and has been validated for use at [facility]. Negative results do not preclude COVID-19 infections and should not be used as the sole basis for diagnosis, treatment, or other management decisions.     SERIAL TROPONIN-INITIAL - Normal    Troponin I, High Sensitivity 11      Narrative:     Less than 99th percentile of normal range cutoff-  Female and children under 18 years old <14 ng/L; Male <21 ng/L: Negative  Repeat testing should be performed if clinically indicated.     Female and children under 18 years old 14-50 ng/L; Male 21-50 ng/L:  Consistent with possible cardiac damage and possible increased clinical   risk. Serial measurements may help to assess extent of myocardial damage.     >50 ng/L: Consistent with cardiac damage, increased clinical risk and  myocardial infarction. Serial measurements may help assess extent of   myocardial damage.      NOTE: Children less than 1 year old may  have higher baseline troponin   levels and results should be interpreted in conjunction with the overall   clinical context.     NOTE: Troponin I testing is performed using a different   testing methodology at [facility] than at other   [facility]. Direct result comparisons should only   be made within the same method.   SERIAL TROPONIN, 1 HOUR - Normal    Troponin I, High Sensitivity 11      Narrative:     Less than 99th percentile of normal range cutoff-  Female and children under 18 years old <14 ng/L; Male <21 ng/L: Negative  Repeat testing should be performed if clinically indicated.     Female and children under 18 years old 14-50 ng/L; Male 21-50 ng/L:  Consistent with possible cardiac damage and possible increased clinical   risk. Serial measurements may help to assess extent of myocardial damage.     >50 ng/L: Consistent with cardiac damage, increased clinical risk and  myocardial infarction. Serial measurements may help assess extent of   myocardial damage.      NOTE: Children less than 1 year old may have higher baseline troponin   levels and results should be interpreted in conjunction with the overall   clinical context.     NOTE: Troponin I testing is performed using a different   testing methodology at [facility] than at other   [facility]. Direct result comparisons should only   be made within the same method.   POCT GLUCOSE - Normal    POCT Glucose 90     TROPONIN SERIES- (INITIAL, 1 HR)    Narrative:     The following orders were created for panel order Troponin I Series, High Sensitivity (0, 1 HR).  Procedure                               Abnormality         Status                     ---------                               -----------         ------                     Troponin I, High Sensiti...[[id number]]  Normal              Final result               Troponin, High Sensitivi...[[id number]]  Normal              Final result                 Please view  results for these tests on the individual orders.       XR chest 1 view   Final Result   Bilateral pulmonary infiltrates right greater than left.   Signed by Rafiq Templeton MD          Diagnostic testing considered:         Review of recent and relevant records:    ED Medication Administration:   ED Medication Administration from 08/26/2024 0809 to 08/26/2024 1201         Date/Time Order Dose Route Action Action by     08/26/2024 0902 EDT magnesium sulfate 2 g in sterile water for injection 50 mL 2 g intravenous New Bag Woehrman, A     08/26/2024 0904 EDT methylPREDNISolone sod succinate (SOLU-Medrol) injection 125 mg 125 mg intravenous Given Woehrman, A     08/26/2024 0915 EDT ipratropium-albuteroL (Duo-Neb) 0.5-2.5 mg/3 mL nebulizer solution 3 mL 3 mL nebulization Given Rozsahegyi, O     08/26/2024 0918 EDT ipratropium-albuteroL (Duo-Neb) 0.5-2.5 mg/3 mL nebulizer solution 3 mL 3 mL nebulization Given Rozsahegyi, O     08/26/2024 0922 EDT magnesium sulfate 2 g in sterile water for injection 50 mL 0 g intravenous Stopped Woehrman, A     08/26/2024 0923 EDT ipratropium-albuteroL (Duo-Neb) 0.5-2.5 mg/3 mL nebulizer solution 3 mL 3 mL nebulization Given Rozsahegyi, O            Prescription Medication Consideration/Given:     Social Determinants of Health Significantly Affecting Care:      Summary:    BP      Temp      Pulse     Resp      SpO2        Abnormal Labs Reviewed   CBC WITH AUTO DIFFERENTIAL - Abnormal; Notable for the following components:       Result Value    WBC 19.8 (*)     Hemoglobin 13.3 (*)     Hematocrit 39.9 (*)     RDW 15.6 (*)     Neutrophils Absolute 16.62 (*)     Monocytes Absolute 1.78 (*)     All other components within normal limits   COMPREHENSIVE METABOLIC PANEL - Abnormal; Notable for the following components:    Glucose 56 (*)     Urea Nitrogen 37 (*)     All other components within normal limits     Diagnostic evaluation was completed.  2 sets of high-sensitivity troponin are in the normal  range.  Therefore I do not suspect acute coronary syndrome.  BNP is in the normal range so I do not suspect CHF.  Lactates in the normal range so do not suspect sepsis.  Metabolic panel shows a low glucose of 56.  This was resolved with drinking orange juice.  The patient was asymptomatic with his low blood sugar.  Sodium potassium in the normal range.  Renal function is in the normal range except BUN is slightly elevated at 37.  Liver function is in the normal range.  COVID is negative.  INR is 1.1.  CBC shows an elevated white count of 19.8.  There is mild anemia with a hemoglobin of 13.3.  Chest x-ray shows bilateral pulmonary infiltrates right greater than left.    The patient was treated with multiple breathing treatments, IV Solu-Medrol and magnesium.  He is also treated with IV antibiotics.    The patient will be hospitalized for further treatment and evaluation.    I discussed the results and plan for hospitalization with the patient and/or family/friend if present.  Questions were addressed.  Patient and/or family/friend expressed understanding.    The patient's condition requires ongoing treatment and evaluation necessitating hospital admission.  I have reviewed the patient's history, physical exam, and test information with the admitting physician,    JAVAN Bangura/Dr. Alcazar               , who agrees to hospitalize the patient.         Diagnoses as of 08/26/24 1201   Pneumonia of both lungs due to infectious organism, unspecified part of lung   COPD exacerbation (Multi)           Procedure  Procedures     Lenny VALERA MD  08/26/24 1201

## 2024-08-27 LAB
ANION GAP SERPL CALC-SCNC: 17 MMOL/L (ref 10–20)
BUN SERPL-MCNC: 46 MG/DL (ref 6–23)
CALCIUM SERPL-MCNC: 8.7 MG/DL (ref 8.6–10.3)
CHLORIDE SERPL-SCNC: 102 MMOL/L (ref 98–107)
CO2 SERPL-SCNC: 20 MMOL/L (ref 21–32)
CREAT SERPL-MCNC: 1.42 MG/DL (ref 0.5–1.3)
EGFRCR SERPLBLD CKD-EPI 2021: 52 ML/MIN/1.73M*2
ERYTHROCYTE [DISTWIDTH] IN BLOOD BY AUTOMATED COUNT: 15.7 % (ref 11.5–14.5)
GLUCOSE BLD MANUAL STRIP-MCNC: 261 MG/DL (ref 74–99)
GLUCOSE BLD MANUAL STRIP-MCNC: 355 MG/DL (ref 74–99)
GLUCOSE BLD MANUAL STRIP-MCNC: 385 MG/DL (ref 74–99)
GLUCOSE BLD MANUAL STRIP-MCNC: 428 MG/DL (ref 74–99)
GLUCOSE SERPL-MCNC: 272 MG/DL (ref 74–99)
HCT VFR BLD AUTO: 34.5 % (ref 41–52)
HGB BLD-MCNC: 11.8 G/DL (ref 13.5–17.5)
HOLD SPECIMEN: NORMAL
MCH RBC QN AUTO: 29.4 PG (ref 26–34)
MCHC RBC AUTO-ENTMCNC: 34.2 G/DL (ref 32–36)
MCV RBC AUTO: 86 FL (ref 80–100)
NRBC BLD-RTO: 0 /100 WBCS (ref 0–0)
PLATELET # BLD AUTO: 270 X10*3/UL (ref 150–450)
POTASSIUM SERPL-SCNC: 4.1 MMOL/L (ref 3.5–5.3)
RBC # BLD AUTO: 4.01 X10*6/UL (ref 4.5–5.9)
SODIUM SERPL-SCNC: 135 MMOL/L (ref 136–145)
WBC # BLD AUTO: 15.4 X10*3/UL (ref 4.4–11.3)

## 2024-08-27 PROCEDURE — 94640 AIRWAY INHALATION TREATMENT: CPT

## 2024-08-27 PROCEDURE — 82947 ASSAY GLUCOSE BLOOD QUANT: CPT

## 2024-08-27 PROCEDURE — 85027 COMPLETE CBC AUTOMATED: CPT

## 2024-08-27 PROCEDURE — 99232 SBSQ HOSP IP/OBS MODERATE 35: CPT

## 2024-08-27 PROCEDURE — 2500000002 HC RX 250 W HCPCS SELF ADMINISTERED DRUGS (ALT 637 FOR MEDICARE OP, ALT 636 FOR OP/ED): Performed by: STUDENT IN AN ORGANIZED HEALTH CARE EDUCATION/TRAINING PROGRAM

## 2024-08-27 PROCEDURE — 36415 COLL VENOUS BLD VENIPUNCTURE: CPT

## 2024-08-27 PROCEDURE — 82374 ASSAY BLOOD CARBON DIOXIDE: CPT

## 2024-08-27 PROCEDURE — 2500000001 HC RX 250 WO HCPCS SELF ADMINISTERED DRUGS (ALT 637 FOR MEDICARE OP): Performed by: STUDENT IN AN ORGANIZED HEALTH CARE EDUCATION/TRAINING PROGRAM

## 2024-08-27 PROCEDURE — 2500000001 HC RX 250 WO HCPCS SELF ADMINISTERED DRUGS (ALT 637 FOR MEDICARE OP)

## 2024-08-27 PROCEDURE — 1200000002 HC GENERAL ROOM WITH TELEMETRY DAILY

## 2024-08-27 PROCEDURE — 2500000004 HC RX 250 GENERAL PHARMACY W/ HCPCS (ALT 636 FOR OP/ED)

## 2024-08-27 PROCEDURE — 2500000002 HC RX 250 W HCPCS SELF ADMINISTERED DRUGS (ALT 637 FOR MEDICARE OP, ALT 636 FOR OP/ED)

## 2024-08-27 PROCEDURE — 2500000004 HC RX 250 GENERAL PHARMACY W/ HCPCS (ALT 636 FOR OP/ED): Performed by: STUDENT IN AN ORGANIZED HEALTH CARE EDUCATION/TRAINING PROGRAM

## 2024-08-27 RX ORDER — PREDNISONE 5 MG/1
TABLET ORAL
Qty: 78 TABLET | Refills: 0 | OUTPATIENT
Start: 2024-08-27 | End: 2024-09-08

## 2024-08-27 RX ORDER — INSULIN LISPRO 100 [IU]/ML
0-20 INJECTION, SOLUTION INTRAVENOUS; SUBCUTANEOUS
Status: DISCONTINUED | OUTPATIENT
Start: 2024-08-27 | End: 2024-08-29 | Stop reason: HOSPADM

## 2024-08-27 RX ORDER — DOCUSATE SODIUM 100 MG/1
100 CAPSULE, LIQUID FILLED ORAL DAILY
Status: DISCONTINUED | OUTPATIENT
Start: 2024-08-27 | End: 2024-08-29 | Stop reason: HOSPADM

## 2024-08-27 RX ORDER — LEVOFLOXACIN 250 MG/1
500 TABLET ORAL
Status: DISCONTINUED | OUTPATIENT
Start: 2024-08-27 | End: 2024-08-29 | Stop reason: HOSPADM

## 2024-08-27 ASSESSMENT — PAIN SCALES - GENERAL
PAINLEVEL_OUTOF10: 0 - NO PAIN
PAINLEVEL_OUTOF10: 0 - NO PAIN

## 2024-08-27 NOTE — PROGRESS NOTES
Physical Therapy                 Therapy Communication Note    Patient Name: Haile Mcfarland  MRN: [id number]  Today's Date: 8/27/2024 1034    Discipline: Physical Therapy    Missed Visit Reason: Missed Visit Reason:  (Patient up moving independently in room Denies any PT needs)

## 2024-08-27 NOTE — PROGRESS NOTES
08/27/24 1837   Discharge Planning   Living Arrangements Spouse/significant other   Support Systems Spouse/significant other   Assistance Needed shares iadls w/ spouse. independent. drives. manages meds, no barriers. has no home o2. uses a cane.   Type of Residence Private residence  (2 story)   Number of Stairs to Enter Residence 2   Number of Stairs Within Residence 25  (15 to 2nd flr  10 to basement)   Do you have animals or pets at home? Yes   Type of Animals or Pets 1 dog -melvin   Who is requesting discharge planning? Provider   Home or Post Acute Services Community services;Other (Comment)  (? healthy at home- tbd)   Expected Discharge Disposition Home   Does the patient need discharge transport arranged? No     Met w/ pt who confirms demo' sins and pcp. Pt is a long conversationalist. Has no home o2. O2 at present. May require home o2 eval. Will see if he will agree to healthy at home. Adod 1-2 days.

## 2024-08-27 NOTE — PROGRESS NOTES
"Daily Progress Note    Haile Mcfarland is a 73 y.o. male on day 1 of admission presenting with Pneumonia due to infectious organism, unspecified laterality, unspecified part of lung.    Subjective   Patient seen up ambulating to the bathroom.  Patient feeling much better today still with a cough and short of breath with exertion.  Will continue to wean O2.  Denies chest pain or shortness of breath at rest.       Objective     Physical Exam    Physical Exam  Constitutional:       Appearance: He is obese. He is ill-appearing.   HENT:      Head: Normocephalic.      Mouth/Throat:      Mouth: Mucous membranes are moist.   Eyes:      Pupils: Pupils are equal, round, and reactive to light.   Cardiovascular:      Rate and Rhythm: Normal rate and regular rhythm.      Heart sounds: Normal heart sounds, S1 normal and S2 normal.   Pulmonary:      Effort: Pulmonary effort is normal.      Breath sounds: Scattered rhonchi present.      Comments: 2L/min NC  Abdominal:      General: Bowel sounds are normal.      Palpations: Abdomen is soft.   Musculoskeletal:         General: Normal range of motion.      Cervical back: Neck supple.   Skin:     General: Skin is warm.   Neurological:      Mental Status: He is alert and oriented to person, place, and time.      Motor: Weakness present.   Psychiatric:         Mood and Affect: Mood normal.         Behavior: Behavior normal.     Last Recorded Vitals  Blood pressure 132/86, pulse 94, temperature 36.3 °C (97.3 °F), resp. rate 17, height 1.905 m (6' 3\"), weight 127 kg (280 lb), SpO2 91%.  Intake/Output last 3 Shifts:  I/O last 3 completed shifts:  In: 200 (1.6 mL/kg) [I.V.:50 (0.4 mL/kg); IV Piggyback:150]  Out: 400 (3.1 mL/kg) [Urine:400 (0.1 mL/kg/hr)]  Weight: 127 kg     Medications  Scheduled medications  amLODIPine, 10 mg, oral, q PM  aspirin, 81 mg, oral, Daily  atorvastatin, 80 mg, oral, Nightly  carvedilol, 12.5 mg, oral, BID  clopidogrel, 75 mg, oral, Daily  enoxaparin, 40 mg, " subcutaneous, q24h  insulin glargine, 40 Units, subcutaneous, BID  insulin lispro, 0-20 Units, subcutaneous, TID  ipratropium-albuteroL, 3 mL, nebulization, TID  levoFLOXacin, 500 mg, oral, q24h CANDICE  losartan, 50 mg, oral, Daily  magnesium oxide, 400 mg, oral, Daily  methylPREDNISolone sodium succinate (PF), 40 mg, intravenous, q12h      Continuous medications     PRN medications  PRN medications: acetaminophen **OR** acetaminophen **OR** acetaminophen, dextrose, dextrose, glucagon, glucagon, ipratropium-albuteroL, ondansetron **OR** ondansetron    Labs  CBC:   Results from last 7 days   Lab Units 08/27/24  0551 08/26/24  0855   WBC AUTO x10*3/uL 15.4* 19.8*   RBC AUTO x10*6/uL 4.01* 4.58   HEMOGLOBIN g/dL 11.8* 13.3*   HEMATOCRIT % 34.5* 39.9*   MCV fL 86 87   MCH pg 29.4 29.0   MCHC g/dL 34.2 33.3   RDW % 15.7* 15.6*   PLATELETS AUTO x10*3/uL 270 303     CMP:    Results from last 7 days   Lab Units 08/27/24  0551 08/26/24  0855   SODIUM mmol/L 135* 140   POTASSIUM mmol/L 4.1 4.1   CHLORIDE mmol/L 102 105   CO2 mmol/L 20* 27   BUN mg/dL 46* 37*   CREATININE mg/dL 1.42* 1.23   GLUCOSE mg/dL 272* 56*   PROTEIN TOTAL g/dL  --  7.1   CALCIUM mg/dL 8.7 9.1   BILIRUBIN TOTAL mg/dL  --  0.7   ALK PHOS U/L  --  112   AST U/L  --  17   ALT U/L  --  19     BMP:    Results from last 7 days   Lab Units 08/27/24  0551 08/26/24  0855   SODIUM mmol/L 135* 140   POTASSIUM mmol/L 4.1 4.1   CHLORIDE mmol/L 102 105   CO2 mmol/L 20* 27   BUN mg/dL 46* 37*   CREATININE mg/dL 1.42* 1.23   CALCIUM mg/dL 8.7 9.1   GLUCOSE mg/dL 272* 56*     Magnesium:  Results from last 7 days   Lab Units 08/26/24  0855   MAGNESIUM mg/dL 1.79     Troponin:    Results from last 7 days   Lab Units 08/26/24  1016 08/26/24  0855   TROPHS ng/L 11 11     BNP:   Results from last 7 days   Lab Units 08/26/24  0855   BNP pg/mL 71     Lipid Panel:  Results from last 7 days   Lab Units 08/26/24  0855   INR  1.1   PROTIME seconds 11.9        Nutrition              Relevant Results  Results from last 7 days   Lab Units 08/27/24  1050 08/27/24  0613 08/27/24  0551 08/26/24  2103 08/26/24  1625 08/26/24  1253 08/26/24  1008 08/26/24  0855   POCT GLUCOSE mg/dL 428* 261*  --  331* 223* 99   < >  --    GLUCOSE mg/dL  --   --  272*  --   --   --   --  56*    < > = values in this interval not displayed.     Lab Results   Component Value Date    HGBA1C 8.0 (H) 05/31/2024        Assessment/Plan    Acute hypoxic respiratory failure  Pneumonia  COPD exacerbation  T2DM  Loop recorder explant July 2024  HTN/HLD  CAD  Morbid obesity    -Continue IV Levaquin  -Continue Solu-Medrol and DuoNebs  -CBC BMP daily  -Continue to wean O2  -Resume home meds  -Urine for Legionella and strep  -Diabetic diet with Accu-Cheks and sliding scale  -Resume Lantus 40 units at 8  -BMI 35.0, encouraged healthy lifestyle change  -PT/OT evaluation  -TCC for discharge planning      DVTp: Lovenox    PLAN: Home with spouse    YUAN Haskins-CNP    Plan of care was discussed extensively with patient.  Patient verbalized understanding through teach back method.  All question and concerns addressed upon examination.    Of note, this documentation is completed using the Dragon Dictation system (voice recognition software). There may be spelling and/or grammatical errors that were not corrected prior to final submission.

## 2024-08-27 NOTE — PROGRESS NOTES
Occupational Therapy                 Therapy Communication Note- SCREEN    Patient Name: Haile Mcfarland  MRN: [id number]  Today's Date: 8/27/2024     Discipline: Occupational Therapy    Missed Visit Reason:  screen completed, pt has been toileting self indep while in hospital, denies any concerns with ADLS or functional mobility. No additional OT indicated at this time

## 2024-08-28 ENCOUNTER — APPOINTMENT (OUTPATIENT)
Dept: RADIOLOGY | Facility: HOSPITAL | Age: 74
End: 2024-08-28
Payer: MEDICARE

## 2024-08-28 LAB
ANION GAP SERPL CALC-SCNC: 14 MMOL/L (ref 10–20)
BUN SERPL-MCNC: 49 MG/DL (ref 6–23)
CALCIUM SERPL-MCNC: 8.7 MG/DL (ref 8.6–10.3)
CHLORIDE SERPL-SCNC: 104 MMOL/L (ref 98–107)
CO2 SERPL-SCNC: 23 MMOL/L (ref 21–32)
CREAT SERPL-MCNC: 1.22 MG/DL (ref 0.5–1.3)
EGFRCR SERPLBLD CKD-EPI 2021: 63 ML/MIN/1.73M*2
ERYTHROCYTE [DISTWIDTH] IN BLOOD BY AUTOMATED COUNT: 15.9 % (ref 11.5–14.5)
GLUCOSE BLD MANUAL STRIP-MCNC: 257 MG/DL (ref 74–99)
GLUCOSE BLD MANUAL STRIP-MCNC: 264 MG/DL (ref 74–99)
GLUCOSE BLD MANUAL STRIP-MCNC: 282 MG/DL (ref 74–99)
GLUCOSE BLD MANUAL STRIP-MCNC: 290 MG/DL (ref 74–99)
GLUCOSE BLD MANUAL STRIP-MCNC: 305 MG/DL (ref 74–99)
GLUCOSE SERPL-MCNC: 300 MG/DL (ref 74–99)
HCT VFR BLD AUTO: 34.2 % (ref 41–52)
HGB BLD-MCNC: 11.5 G/DL (ref 13.5–17.5)
HOLD SPECIMEN: NORMAL
LEGIONELLA AG UR QL: NEGATIVE
MCH RBC QN AUTO: 28.9 PG (ref 26–34)
MCHC RBC AUTO-ENTMCNC: 33.6 G/DL (ref 32–36)
MCV RBC AUTO: 86 FL (ref 80–100)
NRBC BLD-RTO: 0 /100 WBCS (ref 0–0)
PLATELET # BLD AUTO: 273 X10*3/UL (ref 150–450)
POTASSIUM SERPL-SCNC: 4.1 MMOL/L (ref 3.5–5.3)
RBC # BLD AUTO: 3.98 X10*6/UL (ref 4.5–5.9)
S PNEUM AG UR QL: NEGATIVE
SODIUM SERPL-SCNC: 137 MMOL/L (ref 136–145)
WBC # BLD AUTO: 17.9 X10*3/UL (ref 4.4–11.3)

## 2024-08-28 PROCEDURE — 80048 BASIC METABOLIC PNL TOTAL CA: CPT

## 2024-08-28 PROCEDURE — 93971 EXTREMITY STUDY: CPT | Performed by: RADIOLOGY

## 2024-08-28 PROCEDURE — 2500000002 HC RX 250 W HCPCS SELF ADMINISTERED DRUGS (ALT 637 FOR MEDICARE OP, ALT 636 FOR OP/ED): Performed by: STUDENT IN AN ORGANIZED HEALTH CARE EDUCATION/TRAINING PROGRAM

## 2024-08-28 PROCEDURE — 2500000001 HC RX 250 WO HCPCS SELF ADMINISTERED DRUGS (ALT 637 FOR MEDICARE OP): Performed by: STUDENT IN AN ORGANIZED HEALTH CARE EDUCATION/TRAINING PROGRAM

## 2024-08-28 PROCEDURE — 36415 COLL VENOUS BLD VENIPUNCTURE: CPT

## 2024-08-28 PROCEDURE — 93971 EXTREMITY STUDY: CPT

## 2024-08-28 PROCEDURE — 2500000004 HC RX 250 GENERAL PHARMACY W/ HCPCS (ALT 636 FOR OP/ED): Performed by: STUDENT IN AN ORGANIZED HEALTH CARE EDUCATION/TRAINING PROGRAM

## 2024-08-28 PROCEDURE — 1200000002 HC GENERAL ROOM WITH TELEMETRY DAILY

## 2024-08-28 PROCEDURE — 82947 ASSAY GLUCOSE BLOOD QUANT: CPT

## 2024-08-28 PROCEDURE — 99232 SBSQ HOSP IP/OBS MODERATE 35: CPT | Performed by: STUDENT IN AN ORGANIZED HEALTH CARE EDUCATION/TRAINING PROGRAM

## 2024-08-28 PROCEDURE — 2500000001 HC RX 250 WO HCPCS SELF ADMINISTERED DRUGS (ALT 637 FOR MEDICARE OP)

## 2024-08-28 PROCEDURE — 94640 AIRWAY INHALATION TREATMENT: CPT

## 2024-08-28 PROCEDURE — 2500000005 HC RX 250 GENERAL PHARMACY W/O HCPCS: Performed by: STUDENT IN AN ORGANIZED HEALTH CARE EDUCATION/TRAINING PROGRAM

## 2024-08-28 PROCEDURE — 85027 COMPLETE CBC AUTOMATED: CPT

## 2024-08-28 PROCEDURE — 2500000004 HC RX 250 GENERAL PHARMACY W/ HCPCS (ALT 636 FOR OP/ED)

## 2024-08-28 RX ORDER — FUROSEMIDE 10 MG/ML
20 INJECTION INTRAMUSCULAR; INTRAVENOUS DAILY
Status: DISCONTINUED | OUTPATIENT
Start: 2024-08-28 | End: 2024-08-29 | Stop reason: HOSPADM

## 2024-08-28 RX ORDER — INSULIN LISPRO 100 [IU]/ML
0-10 INJECTION, SOLUTION INTRAVENOUS; SUBCUTANEOUS NIGHTLY
Status: DISCONTINUED | OUTPATIENT
Start: 2024-08-28 | End: 2024-08-29 | Stop reason: HOSPADM

## 2024-08-28 ASSESSMENT — PAIN SCALES - GENERAL
PAINLEVEL_OUTOF10: 0 - NO PAIN
PAINLEVEL_OUTOF10: 0 - NO PAIN

## 2024-08-28 ASSESSMENT — PAIN - FUNCTIONAL ASSESSMENT: PAIN_FUNCTIONAL_ASSESSMENT: 0-10

## 2024-08-28 NOTE — PROGRESS NOTES
Respiratory Therapy Note    Patient ordered for home o2 eval. Spoke with RN.  Patient refusing to go home until tomorrow or Friday.  Will ensure testing is done on day of discharge.

## 2024-08-28 NOTE — PROGRESS NOTES
"Haile Mcfarland is a 73 y.o. male on day 2 of admission presenting with Pneumonia due to infectious organism, unspecified laterality, unspecified part of lung.    Subjective   Patient seen and examined.  As soon as I entered the room, patient said \"I am not going home today\" denies any fevers, chest pain, still bringing up  phlegm was on oxygen which he does not have at home.  Complaining of swelling of his left lower extremity       Objective     Physical Exam  Constitutional:       Appearance: Normal appearance.   HENT:      Head: Normocephalic and atraumatic.   Cardiovascular:      Rate and Rhythm: Normal rate and regular rhythm.   Pulmonary:      Comments: Few scattered rales  Musculoskeletal:         General: Swelling present.      Cervical back: No rigidity.   Neurological:      General: No focal deficit present.      Mental Status: He is alert.         Last Recorded Vitals  Blood pressure 135/73, pulse 85, temperature 36.1 °C (97 °F), temperature source Temporal, resp. rate 18, height 1.905 m (6' 3\"), weight 127 kg (280 lb), SpO2 95%.  Intake/Output last 3 Shifts:  I/O last 3 completed shifts:  In: 500 (3.9 mL/kg) [P.O.:500]  Out: 400 (3.1 mL/kg) [Urine:400 (0.1 mL/kg/hr)]  Weight: 127 kg     Relevant Results                              Assessment/Plan   Assessment & Plan  Pneumonia due to infectious organism, unspecified laterality, unspecified part of lung    Pneumonia of both lungs due to infectious organism, unspecified part of lung    Acute hypoxic respiratory failure  Pneumonia  COPD exacerbation  T2DM  Loop recorder explant July 2024  HTN/HLD  CAD  Morbid obesity    Patient is complaining of left lower extremity swelling, will order a venous duplex to rule out underlying DVT  Clinically seems to be doing better we will continue IV Levaquin  He is on oxygen, will order home O2 eval  Continue Solu-Medrol, Edwardo  Will put him on Lasix 20 mg IV daily as well  Creatinine has normalized  Home medications " resumed  Insulin sliding scale, Lantus, diabetic diet, fingerstick blood sugar monitoring  PT OT ordered  Patient wants to be discharged home when ready  Supportive care, symptomatic treatment  DVT prophylaxis  Persistent leukocytosis might be steroid related, Legionella, strep pneumo antigens were negative             Ricki Maldonado MD

## 2024-08-29 VITALS
SYSTOLIC BLOOD PRESSURE: 160 MMHG | BODY MASS INDEX: 34.82 KG/M2 | TEMPERATURE: 98.2 F | HEART RATE: 70 BPM | HEIGHT: 75 IN | WEIGHT: 280 LBS | OXYGEN SATURATION: 91 % | RESPIRATION RATE: 18 BRPM | DIASTOLIC BLOOD PRESSURE: 77 MMHG

## 2024-08-29 LAB
ANION GAP SERPL CALC-SCNC: 12 MMOL/L (ref 10–20)
BUN SERPL-MCNC: 44 MG/DL (ref 6–23)
CALCIUM SERPL-MCNC: 8.5 MG/DL (ref 8.6–10.3)
CHLORIDE SERPL-SCNC: 106 MMOL/L (ref 98–107)
CO2 SERPL-SCNC: 27 MMOL/L (ref 21–32)
CREAT SERPL-MCNC: 1.11 MG/DL (ref 0.5–1.3)
EGFRCR SERPLBLD CKD-EPI 2021: 70 ML/MIN/1.73M*2
ERYTHROCYTE [DISTWIDTH] IN BLOOD BY AUTOMATED COUNT: 15.9 % (ref 11.5–14.5)
GLUCOSE BLD MANUAL STRIP-MCNC: 214 MG/DL (ref 74–99)
GLUCOSE BLD MANUAL STRIP-MCNC: 287 MG/DL (ref 74–99)
GLUCOSE SERPL-MCNC: 223 MG/DL (ref 74–99)
HCT VFR BLD AUTO: 36.2 % (ref 41–52)
HGB BLD-MCNC: 12.1 G/DL (ref 13.5–17.5)
HOLD SPECIMEN: NORMAL
MCH RBC QN AUTO: 29.1 PG (ref 26–34)
MCHC RBC AUTO-ENTMCNC: 33.4 G/DL (ref 32–36)
MCV RBC AUTO: 87 FL (ref 80–100)
NRBC BLD-RTO: 0 /100 WBCS (ref 0–0)
PLATELET # BLD AUTO: 269 X10*3/UL (ref 150–450)
POTASSIUM SERPL-SCNC: 4 MMOL/L (ref 3.5–5.3)
RBC # BLD AUTO: 4.16 X10*6/UL (ref 4.5–5.9)
SODIUM SERPL-SCNC: 141 MMOL/L (ref 136–145)
WBC # BLD AUTO: 14.3 X10*3/UL (ref 4.4–11.3)

## 2024-08-29 PROCEDURE — 2500000002 HC RX 250 W HCPCS SELF ADMINISTERED DRUGS (ALT 637 FOR MEDICARE OP, ALT 636 FOR OP/ED): Performed by: STUDENT IN AN ORGANIZED HEALTH CARE EDUCATION/TRAINING PROGRAM

## 2024-08-29 PROCEDURE — 94760 N-INVAS EAR/PLS OXIMETRY 1: CPT

## 2024-08-29 PROCEDURE — 36415 COLL VENOUS BLD VENIPUNCTURE: CPT

## 2024-08-29 PROCEDURE — 82947 ASSAY GLUCOSE BLOOD QUANT: CPT

## 2024-08-29 PROCEDURE — 2500000005 HC RX 250 GENERAL PHARMACY W/O HCPCS: Performed by: STUDENT IN AN ORGANIZED HEALTH CARE EDUCATION/TRAINING PROGRAM

## 2024-08-29 PROCEDURE — 2500000001 HC RX 250 WO HCPCS SELF ADMINISTERED DRUGS (ALT 637 FOR MEDICARE OP)

## 2024-08-29 PROCEDURE — 2500000004 HC RX 250 GENERAL PHARMACY W/ HCPCS (ALT 636 FOR OP/ED): Performed by: STUDENT IN AN ORGANIZED HEALTH CARE EDUCATION/TRAINING PROGRAM

## 2024-08-29 PROCEDURE — 85027 COMPLETE CBC AUTOMATED: CPT

## 2024-08-29 PROCEDURE — 2500000001 HC RX 250 WO HCPCS SELF ADMINISTERED DRUGS (ALT 637 FOR MEDICARE OP): Performed by: STUDENT IN AN ORGANIZED HEALTH CARE EDUCATION/TRAINING PROGRAM

## 2024-08-29 PROCEDURE — 2500000004 HC RX 250 GENERAL PHARMACY W/ HCPCS (ALT 636 FOR OP/ED)

## 2024-08-29 PROCEDURE — 94640 AIRWAY INHALATION TREATMENT: CPT

## 2024-08-29 PROCEDURE — 80048 BASIC METABOLIC PNL TOTAL CA: CPT

## 2024-08-29 PROCEDURE — 99239 HOSP IP/OBS DSCHRG MGMT >30: CPT | Performed by: STUDENT IN AN ORGANIZED HEALTH CARE EDUCATION/TRAINING PROGRAM

## 2024-08-29 RX ORDER — LEVOFLOXACIN 250 MG/1
750 TABLET ORAL DAILY
Qty: 12 TABLET | Refills: 0 | Status: SHIPPED | OUTPATIENT
Start: 2024-08-29 | End: 2024-09-02

## 2024-08-29 NOTE — PROGRESS NOTES
Home Oxygen Evaluation        Date/Time SpO2 Medical Gas Therapy Medical Gas Delivery Method Oxygen L/min Patient is on During the Study Patient Activity During Study    08/29/24 1205 97 %  None (Room air)  --  -- At rest     08/29/24 1208 91 %  None (Room air)  --  -- Ambulating                     Was a Home Oxygen Evaluation Performed? Yes  Based on the Home Oxygen Evaluation, Does the Patient Qualify for Home Oxygen Therapy? No - This patient does not qualify for home oxygen. They have O2 saturations greater than or equal to 89% while ambulating.            Recommendations:

## 2024-08-29 NOTE — PROGRESS NOTES
Discharge today. Pt does not need home o2 after evaluation completed. Discussed healthy at home program w/ pt. And spouse. Pt is agreeable. Given handout. Pt. ph # confirmed. Referral placed. Rn updated.

## 2024-08-29 NOTE — DISCHARGE SUMMARY
"Discharge Diagnosis  Pneumonia due to infectious organism, unspecified laterality, unspecified part of lung    Issues Requiring Follow-Up      Discharge Meds     Your medication list        CONTINUE taking these medications        Instructions Last Dose Given Next Dose Due   pen needle, diabetic 32 gauge x 5/32\" needle      USE 1 NEEDLE 3 TO 4 TIMES DAILY AS NEEDED              ASK your doctor about these medications        Instructions Last Dose Given Next Dose Due   albuterol 2.5 mg /3 mL (0.083 %) nebulizer solution      USE 1 VIAL IN NEBULIZER EVERY 6 HOURS AS NEEDED FOR WHEEZING       amLODIPine 10 mg tablet  Commonly known as: Norvasc      TAKE 1 TABLET BY MOUTH ONCE DAILY IN THE EVENING       aspirin 81 mg EC tablet           atorvastatin 80 mg tablet  Commonly known as: Lipitor      TAKE 1 TABLET BY MOUTH ONCE DAILY AT BEDTIME       azithromycin 250 mg tablet  Commonly known as: Zithromax  Start taking on: August 14, 2024  Ask about: Should I take this medication?      Take 2 tablets (500 mg) by mouth once daily for 1 day, THEN 1 tablet (250 mg) once daily for 4 days. Take 2 tabs (500 mg) by mouth today, than 1 daily for 4 days..       carvedilol 12.5 mg tablet  Commonly known as: Coreg      TAKE 1 TABLET BY MOUTH TWICE DAILY WITH MEALS       clopidogrel 75 mg tablet  Commonly known as: Plavix      Take 1 tablet by mouth once daily       Contour Next Test Strips strip  Generic drug: blood sugar diagnostic      USE 1 STRIP TO CHECK GLUCOSE THREE TIMES DAILY       hydroCHLOROthiazide 25 mg tablet  Commonly known as: HYDRODiuril      TAKE 1 TABLET BY MOUTH ONCE DAILY IN THE MORNING       insulin glargine 300 unit/mL (1.5 mL) injection  Commonly known as: Toujeo SoloStar U-300 Insulin  Ask about: Which instructions should I use?      INJECT 50 UNITS SUBCUTANEOUSLY TWICE DAILY       ipratropium-albuteroL 0.5-2.5 mg/3 mL nebulizer solution  Commonly known as: Duo-Neb      USE 1 AMPULE IN NEBULIZER EVERY 8 HOURS AS " NEEDED FOR WHEEZING FOR SHORTNESS OF BREATH       losartan 50 mg tablet  Commonly known as: Cozaar      Take 1 tablet by mouth twice daily       Lumigan 0.01 % ophthalmic solution  Generic drug: bimatoprost      INSTILL 1 DROP INTO RIGHT EYE AT BEDTIME       magnesium oxide 400 mg (241.3 mg magnesium) tablet  Commonly known as: Mag-Ox           metFORMIN 1,000 mg tablet  Commonly known as: Glucophage      Take 1 tablet by mouth twice daily with food       nitroglycerin 0.4 mg SL tablet  Commonly known as: Nitrostat      DISSOLVE ONE TABLET UNDER THE TONGUE EVERY 5 MINUTES AS NEEDED FOR CHEST PAIN.  DO NOT EXCEED A TOTAL OF 3 DOSES IN 15 MINUTES       NovoLOG Flexpen U-100 Insulin 100 unit/mL (3 mL) pen  Generic drug: insulin aspart           prednisoLONE acetate 1 % ophthalmic suspension  Commonly known as: Pred-Forte      Administer 1 drop into the left eye 2 times a day.       Simbrinza 1-0.2 % drops,suspension ophthalmic suspension  Generic drug: brinzolamide-brimonidine      Administer 1 drop into the right eye 2 times a day.       Trelegy Ellipta 100-62.5-25 mcg blister with device  Generic drug: fluticasone-umeclidin-vilanter      Inhale 1 puff once daily. Rinse mouth with water after use to reduce aftertaste and incidence of candidiasis. Do not swallow.                 Where to Get Your Medications        These medications were sent to [facility] [address] [place] [address] [id number]  [address]  [address] [address] [address]      Phone: [phone]   insulin glargine 300 unit/mL (1.5 mL) injection         Test Results Pending At Discharge  Pending Labs       No current pending labs.            Hospital Course   73-year-old male with past medical history of type 2 diabetes, COPD, status post loop recorder, hypertension, hyperlipidemia, coronary artery disease, morbid obesity who was admitted with acute hypoxemic respiratory failure secondary to pneumonia possibly gram-negative.  He was treated with IV  Levaquin, oxygen support, Solu-Medrol, DuoNebs.  He is doing better, feels better, phlegm has decreased in production, no chest pain, fever or chills.  Leukocytosis has been improving despite him being on steroids.  We will do a walking pulse ox before discharge, he is agreeable to using oxygen at home, I did have a face-to-face with him.  Will discharge him on Levaquin for 4 more days urine Legionella and strep pneumo were negative.  He was complaining of left lower extremity swelling I ordered a duplex which was negative.  Patient has nebulizer at home.    Pertinent Physical Exam At Time of Discharge  Physical Exam  HENT:      Head: Normocephalic and atraumatic.      Mouth/Throat:      Mouth: Mucous membranes are dry.   Eyes:      Extraocular Movements: Extraocular movements intact.   Cardiovascular:      Rate and Rhythm: Normal rate and regular rhythm.   Pulmonary:      Effort: Pulmonary effort is normal.      Breath sounds: No rhonchi.   Abdominal:      Palpations: Abdomen is soft.   Neurological:      General: No focal deficit present.   Psychiatric:         Mood and Affect: Mood normal.         Outpatient Follow-Up  Future Appointments   Date Time Provider Department Center   9/11/2024  9:30 AM Mario Tatum MD [id number] [place]   9/30/2024  9:45 AM Andres Chapa MD [id number] [place]   10/2/2024 12:30 PM Mustapha Ozuna MD [id number] [place]   10/16/2024 10:00 AM Mary Beth Vo APRN-CNP [id number] [place]   11/8/2024  8:30 AM Nolan Rosas MD [id number] [place]   1/9/2025  1:00 PM Carol Briggs APRN-CNP [id number] [place]   3/14/2025 10:00 AM ELY ULTRASOUND 2 ELYUS [place]   3/20/2025  3:00 PM Morgan Tomlinson MD [id number] [place]         Ricki Maldonado MD

## 2024-08-30 ENCOUNTER — PATIENT OUTREACH (OUTPATIENT)
Dept: PRIMARY CARE | Facility: CLINIC | Age: 74
End: 2024-08-30
Payer: MEDICARE

## 2024-08-30 NOTE — PROGRESS NOTES
Discharge Facility:[facility]  Discharge Diagnosis:Pneumonia due to infectious disease  Admission Date:8/26/24  Discharge Date: 8/29/24    PCP Appointment Date:Task sent to office  Specialist Appointment Date: Cardiology 9/11/24, Pulmonary 10/16/24  Hospital Encounter and Summary Linked: Yes  See discharge assessment below for further details    Medications  Medications reviewed with patient/caregiver?: Yes (8/30/2024  3:30 PM)  Is the patient having any side effects they believe may be caused by any medication additions or changes?: No (8/30/2024  3:30 PM)  Does the patient have all medications ordered at discharge?: Yes (8/30/2024  3:30 PM)  Care Management Interventions: Provided patient education (8/30/2024  3:30 PM)  Prescription Comments: Levofloxacin (8/30/2024  3:30 PM)  Is the patient taking all medications as directed (includes completed medication regime)?: Yes (8/30/2024  3:30 PM)  Care Management Interventions: Provided patient education (8/30/2024  3:30 PM)  Medication Comments: Stop Zithromax (8/30/2024  3:30 PM)    Appointments  Does the patient have a primary care provider?: Yes (8/30/2024  3:30 PM)  Care Management Interventions: Advised patient to make appointment (8/30/2024  3:30 PM)  Has the patient kept scheduled appointments due by today?: Yes (8/30/2024  3:30 PM)    Self Management  Has home health visited the patient within 72 hours of discharge?: Not applicable (8/30/2024  3:30 PM)  What Durable Medical Equipment (DME) was ordered?: N/A (8/30/2024  3:30 PM)    Patient Teaching  Does the patient have access to their discharge instructions?: Yes (8/30/2024  3:30 PM)  Care Management Interventions: Reviewed instructions with patient (8/30/2024  3:30 PM)  What is the patient's perception of their health status since discharge?: Improving (8/30/2024  3:30 PM)  Is the patient/caregiver able to teach back the hierarchy of who to call/visit for symptoms/problems? PCP, Specialist, Home Health nurse,  Urgent Care, ED, 911: Yes (8/30/2024  3:30 PM)

## 2024-09-02 ENCOUNTER — PATIENT OUTREACH (OUTPATIENT)
Dept: HOME HEALTH SERVICES | Age: 74
End: 2024-09-02
Payer: MEDICARE

## 2024-09-02 SDOH — ECONOMIC STABILITY: HOUSING INSECURITY: AT ANY TIME IN THE PAST 12 MONTHS, WERE YOU HOMELESS OR LIVING IN A SHELTER (INCLUDING NOW)?: NO

## 2024-09-02 SDOH — HEALTH STABILITY: MENTAL HEALTH: HOW OFTEN DO YOU HAVE A DRINK CONTAINING ALCOHOL?: NEVER

## 2024-09-02 SDOH — ECONOMIC STABILITY: INCOME INSECURITY: IN THE LAST 12 MONTHS, WAS THERE A TIME WHEN YOU WERE NOT ABLE TO PAY THE MORTGAGE OR RENT ON TIME?: NO

## 2024-09-02 SDOH — SOCIAL STABILITY: SOCIAL INSECURITY
WITHIN THE LAST YEAR, HAVE YOU BEEN KICKED, HIT, SLAPPED, OR OTHERWISE PHYSICALLY HURT BY YOUR PARTNER OR EX-PARTNER?: NO

## 2024-09-02 SDOH — SOCIAL STABILITY: SOCIAL INSECURITY
WITHIN THE LAST YEAR, HAVE TO BEEN RAPED OR FORCED TO HAVE ANY KIND OF SEXUAL ACTIVITY BY YOUR PARTNER OR EX-PARTNER?: NO

## 2024-09-02 SDOH — HEALTH STABILITY: MENTAL HEALTH
STRESS IS WHEN SOMEONE FEELS TENSE, NERVOUS, ANXIOUS, OR CAN'T SLEEP AT NIGHT BECAUSE THEIR MIND IS TROUBLED. HOW STRESSED ARE YOU?: ONLY A LITTLE

## 2024-09-02 SDOH — SOCIAL STABILITY: SOCIAL INSECURITY: WITHIN THE LAST YEAR, HAVE YOU BEEN HUMILIATED OR EMOTIONALLY ABUSED IN OTHER WAYS BY YOUR PARTNER OR EX-PARTNER?: NO

## 2024-09-02 SDOH — HEALTH STABILITY: MENTAL HEALTH: HOW OFTEN DO YOU HAVE 6 OR MORE DRINKS ON ONE OCCASION?: NEVER

## 2024-09-02 SDOH — ECONOMIC STABILITY: INCOME INSECURITY: HOW HARD IS IT FOR YOU TO PAY FOR THE VERY BASICS LIKE FOOD, HOUSING, MEDICAL CARE, AND HEATING?: NOT VERY HARD

## 2024-09-02 SDOH — HEALTH STABILITY: MENTAL HEALTH: HOW MANY STANDARD DRINKS CONTAINING ALCOHOL DO YOU HAVE ON A TYPICAL DAY?: PATIENT DOES NOT DRINK

## 2024-09-02 SDOH — ECONOMIC STABILITY: FOOD INSECURITY: WITHIN THE PAST 12 MONTHS, YOU WORRIED THAT YOUR FOOD WOULD RUN OUT BEFORE YOU GOT MONEY TO BUY MORE.: NEVER TRUE

## 2024-09-02 SDOH — SOCIAL STABILITY: SOCIAL INSECURITY: WITHIN THE LAST YEAR, HAVE YOU BEEN AFRAID OF YOUR PARTNER OR EX-PARTNER?: NO

## 2024-09-02 SDOH — ECONOMIC STABILITY: HOUSING INSECURITY: IN THE PAST 12 MONTHS, HOW MANY TIMES HAVE YOU MOVED WHERE YOU WERE LIVING?: 1

## 2024-09-02 SDOH — HEALTH STABILITY: PHYSICAL HEALTH: ON AVERAGE, HOW MANY MINUTES DO YOU ENGAGE IN EXERCISE AT THIS LEVEL?: 0 MIN

## 2024-09-02 SDOH — SOCIAL STABILITY: SOCIAL NETWORK: ARE YOU MARRIED, WIDOWED, DIVORCED, SEPARATED, NEVER MARRIED, OR LIVING WITH A PARTNER?: MARRIED

## 2024-09-02 SDOH — ECONOMIC STABILITY: INCOME INSECURITY: IN THE PAST 12 MONTHS, HAS THE ELECTRIC, GAS, OIL, OR WATER COMPANY THREATENED TO SHUT OFF SERVICE IN YOUR HOME?: NO

## 2024-09-02 SDOH — HEALTH STABILITY: PHYSICAL HEALTH: ON AVERAGE, HOW MANY DAYS PER WEEK DO YOU ENGAGE IN MODERATE TO STRENUOUS EXERCISE (LIKE A BRISK WALK)?: 0 DAYS

## 2024-09-02 ASSESSMENT — LIFESTYLE VARIABLES
SKIP TO QUESTIONS 9-10: 1
AUDIT-C TOTAL SCORE: 0

## 2024-09-03 ENCOUNTER — APPOINTMENT (OUTPATIENT)
Dept: CARE COORDINATION | Age: 74
End: 2024-09-03
Payer: MEDICARE

## 2024-09-03 ENCOUNTER — PATIENT OUTREACH (OUTPATIENT)
Dept: CARE COORDINATION | Facility: CLINIC | Age: 74
End: 2024-09-03

## 2024-09-03 ENCOUNTER — PATIENT OUTREACH (OUTPATIENT)
Dept: CARE COORDINATION | Age: 74
End: 2024-09-03

## 2024-09-03 LAB
P AXIS: 46 DEGREES
PR INTERVAL: 172 MS
QRS DURATION: 116 MS
QT INTERVAL: 368 MS
QTC CALCULATION(BAZETT): 406 MS
QTC FREDERICIA: 393 MS
R AXIS: -26 DEGREES
T AXIS: 58 DEGREES
VENTRICULAR RATE: 73 BPM

## 2024-09-03 NOTE — PROGRESS NOTES
Outreach call to patient to support a smooth transition of care from recent admission.  Left voicemail message for patient with my contact information.  Alice DAWKINS, RN, [facility]  O: [phone]

## 2024-09-03 NOTE — PROGRESS NOTES
"Daily Call Note:   1315 - Daily call completed with pt this afternoon. Pt stated that he's \"been better. This pneumonia's really got me whipped.\" He endorses SOB and is clearly dyspneic with conversation, but he is also quite verbose. He talked and talked about his hospitalization - from the physicians that he didn't like, to those that he does. And he truly loved the hamburgers.  Difficult to get in any questions, but was able to encourage him to take it easy and not push it. Reminded him that he'd just been discharged recently and that it takes time to recover. He did verbalized understanding.   And then cut the call short.   Initial [facility] is 9/5 at 1800.  No other questions or concerns.    Pt Education: per POC  Barriers: none  Topics for Daily Review: breathing, PNA. ATB  Pt demonstrates clear understanding: Yes    Daily Weight:  There were no vitals filed for this visit.   Last 3 Weights:  Wt Readings from Last 7 Encounters:   08/26/24 127 kg (280 lb)   07/09/24 133 kg (293 lb 3.4 oz)   06/20/24 134 kg (296 lb)   06/06/24 134 kg (296 lb)   04/11/24 135 kg (297 lb)   03/14/24 135 kg (297 lb)   03/08/24 135 kg (297 lb)       Masimo Device: No   Masimo Clinical Impression: n/a    Virtual Visits--Scheduled (Most Recent Date at Top)  Follow up Appointments  Recent Visits  Date Type Provider Dept   08/13/24 Clinical Support Nanette Loya MA Do [id number] [facility]   Showing recent visits within past 30 days and meeting all other requirements  Future Appointments  Date Type Provider Dept   09/10/24 Appointment Mustapha Ozuna MD Do [id number] [facility]   10/02/24 Appointment Mustapha Ozuna MD Do [id number] [facility]   Showing future appointments within next 90 days and meeting all other requirements       Frequency of RN Calls & Virtual Visits per Team Agreement: Healthy at Home Frequency: Daily    Medication issues Addressed (what was done): ATB    Follow up appointments scheduled by [facility] Staff: none  Referrals made by " [facility] staff: none

## 2024-09-04 ENCOUNTER — PATIENT OUTREACH (OUTPATIENT)
Dept: HOME HEALTH SERVICES | Age: 74
End: 2024-09-04
Payer: MEDICARE

## 2024-09-05 ENCOUNTER — PATIENT OUTREACH (OUTPATIENT)
Dept: HOME HEALTH SERVICES | Age: 74
End: 2024-09-05

## 2024-09-05 ENCOUNTER — APPOINTMENT (OUTPATIENT)
Dept: PRIMARY CARE | Facility: CLINIC | Age: 74
End: 2024-09-05
Payer: MEDICARE

## 2024-09-05 NOTE — PROGRESS NOTES
Pt returned call- He states he is feeling crummy and worn down so he was napping. He appreciates out program and he feels he just needs some time to start feeling better. [facility] rescheduled for 9/9 @ 1300       [facility]ute [facility] Care Transitions Assessment    Patient's Address:   [address] [address] [place] [id number]  **  If this is not the address patient will receive services - alert team and address in EMR**       Patient Contacts:  Extended Emergency Contact Information  Primary Emergency Contact: Krystal Mcfarland  Home Phone: [phone]  Work Phone: [phone]  Relation: Spouse                                Patient's Preferred Phone: [phone]  Patient's E-mail: No e-mail address on record

## 2024-09-06 ENCOUNTER — PATIENT OUTREACH (OUTPATIENT)
Dept: HOME HEALTH SERVICES | Age: 74
End: 2024-09-06
Payer: MEDICARE

## 2024-09-06 NOTE — PROGRESS NOTES
Daily Call Note:   Spoke to patient, he reports feeling a little jet lagged today. He reports being an early riser at about 4am and BG was down to 61 today. He admits not eating anything last night because he was feeling a little down. So he ate and BG recovered to 138 today.  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 He underwent retina surgery some time ago and he his outcome was lost sight in the surgical eye.   He uses a fingerstick glucometer to check levels. Advised eating his meals and maintaining hydration. He has wife at home that helps,  for over 40 years.  No further questions, next call reviewed.    Pt Education: POC  Barriers: na  Topics for Daily Review: POC  Pt demonstrates clear understanding: Yes    Daily Weight:  There were no vitals filed for this visit.   Last 3 Weights:  Wt Readings from Last 7 Encounters:   08/26/24 127 kg (280 lb)   07/09/24 133 kg (293 lb 3.4 oz)   06/20/24 134 kg (296 lb)   06/06/24 134 kg (296 lb)   04/11/24 135 kg (297 lb)   03/14/24 135 kg (297 lb)   03/08/24 135 kg (297 lb)       Masimo Device: No   Masimo Clinical Impression: na    Virtual Visits--Scheduled (Most Recent Date at Top)  Follow up Appointments  Recent Visits  Date Type Provider Dept   08/13/24 Clinical Support Nanette Loya MA Do [id number] [facility]   Showing recent visits within past 30 days and meeting all other requirements  Future Appointments  Date Type Provider Dept   09/10/24 Appointment Mustapha Ozuna MD Do [id number] [facility]   10/02/24 Appointment Mustapha Ozuna MD Do [id number] [facility]   Showing future appointments within next 90 days and meeting all other requirements       Frequency of RN Calls & Virtual Visits per Team Agreement: Healthy at Home Frequency: Daily    Medication issues Addressed (what was done): na    Follow up appointments scheduled by [facility] Staff: na  Referrals made by [facility] staff: na

## 2024-09-07 ENCOUNTER — PATIENT OUTREACH (OUTPATIENT)
Dept: HOME HEALTH SERVICES | Age: 74
End: 2024-09-07
Payer: MEDICARE

## 2024-09-07 NOTE — PROGRESS NOTES
Daily Call Note: [facility] daily call complete.  Pt reports he is feeling tired.  Reports moist cough, using breathing tx as ordered.    Denies CP/SOB/edema.    Pt very chatty on phone call.  Active listening performed.    No other questions/concerns voiced.    Verified upcoming weekly [facility] call.       Pt Education:  POC   Barriers:   Topics for Daily Review:   Pt demonstrates clear understanding: Yes    Daily Weight:  There were no vitals filed for this visit.   Last 3 Weights:  Wt Readings from Last 7 Encounters:   08/26/24 127 kg (280 lb)   07/09/24 133 kg (293 lb 3.4 oz)   06/20/24 134 kg (296 lb)   06/06/24 134 kg (296 lb)   04/11/24 135 kg (297 lb)   03/14/24 135 kg (297 lb)   03/08/24 135 kg (297 lb)       Masimo Device: No   Masimo Clinical Impression:     Virtual Visits--Scheduled (Most Recent Date at Top)  Follow up Appointments  Recent Visits  Date Type Provider Dept   08/13/24 Clinical Support Nanette Loya MA Do [id number] [facility]   Showing recent visits within past 30 days and meeting all other requirements  Future Appointments  Date Type Provider Dept   09/10/24 Appointment Mustapha Ozuna MD Do [id number] [facility]   10/02/24 Appointment Mustapha Ozuna MD Do [id number] [facility]   Showing future appointments within next 90 days and meeting all other requirements       Frequency of RN Calls & Virtual Visits per Team Agreement: Healthy at Home Frequency: Daily    Medication issues Addressed (what was done):     Follow up appointments scheduled by [facility] Staff:   Referrals made by [facility] staff:

## 2024-09-08 DIAGNOSIS — J44.9 CHRONIC OBSTRUCTIVE PULMONARY DISEASE, UNSPECIFIED COPD TYPE (MULTI): Primary | ICD-10-CM

## 2024-09-08 DIAGNOSIS — E11.3293 TYPE 2 DIABETES MELLITUS WITH BOTH EYES AFFECTED BY MILD NONPROLIFERATIVE RETINOPATHY WITHOUT MACULAR EDEMA, WITH LONG-TERM CURRENT USE OF INSULIN (MULTI): ICD-10-CM

## 2024-09-08 DIAGNOSIS — Z79.4 TYPE 2 DIABETES MELLITUS WITH BOTH EYES AFFECTED BY MILD NONPROLIFERATIVE RETINOPATHY WITHOUT MACULAR EDEMA, WITH LONG-TERM CURRENT USE OF INSULIN (MULTI): ICD-10-CM

## 2024-09-09 ENCOUNTER — PATIENT OUTREACH (OUTPATIENT)
Dept: HOME HEALTH SERVICES | Age: 74
End: 2024-09-09

## 2024-09-09 ENCOUNTER — PATIENT OUTREACH (OUTPATIENT)
Dept: CARE COORDINATION | Age: 74
End: 2024-09-09

## 2024-09-09 ENCOUNTER — APPOINTMENT (OUTPATIENT)
Dept: CARE COORDINATION | Age: 74
End: 2024-09-09
Payer: MEDICARE

## 2024-09-09 ENCOUNTER — APPOINTMENT (OUTPATIENT)
Dept: PHARMACY | Facility: HOSPITAL | Age: 74
End: 2024-09-09
Payer: MEDICARE

## 2024-09-09 VITALS — SYSTOLIC BLOOD PRESSURE: 120 MMHG | DIASTOLIC BLOOD PRESSURE: 70 MMHG

## 2024-09-09 DIAGNOSIS — E11.3293 TYPE 2 DIABETES MELLITUS WITH BOTH EYES AFFECTED BY MILD NONPROLIFERATIVE RETINOPATHY WITHOUT MACULAR EDEMA, WITH LONG-TERM CURRENT USE OF INSULIN (MULTI): ICD-10-CM

## 2024-09-09 DIAGNOSIS — Z79.4 TYPE 2 DIABETES MELLITUS WITH BOTH EYES AFFECTED BY MILD NONPROLIFERATIVE RETINOPATHY WITHOUT MACULAR EDEMA, WITH LONG-TERM CURRENT USE OF INSULIN (MULTI): ICD-10-CM

## 2024-09-09 DIAGNOSIS — I10 ESSENTIAL HYPERTENSION, BENIGN: ICD-10-CM

## 2024-09-09 DIAGNOSIS — J44.9 CHRONIC OBSTRUCTIVE PULMONARY DISEASE, UNSPECIFIED COPD TYPE (MULTI): ICD-10-CM

## 2024-09-09 RX ORDER — CARVEDILOL 12.5 MG/1
12.5 TABLET ORAL
Qty: 180 TABLET | Refills: 1 | Status: SHIPPED | OUTPATIENT
Start: 2024-09-09 | End: 2024-09-10 | Stop reason: SDUPTHER

## 2024-09-09 RX ORDER — ALBUTEROL SULFATE 0.83 MG/ML
2.5 SOLUTION RESPIRATORY (INHALATION) EVERY 6 HOURS PRN
Qty: 360 ML | Refills: 11 | Status: SHIPPED | OUTPATIENT
Start: 2024-09-09

## 2024-09-09 RX ORDER — LOSARTAN POTASSIUM 50 MG/1
50 TABLET ORAL DAILY
Qty: 90 TABLET | Refills: 1 | Status: SHIPPED | OUTPATIENT
Start: 2024-09-09

## 2024-09-09 RX ORDER — ALBUTEROL SULFATE 90 UG/1
2 INHALANT RESPIRATORY (INHALATION) EVERY 6 HOURS PRN
Qty: 18 G | Refills: 11 | Status: SHIPPED | OUTPATIENT
Start: 2024-09-09 | End: 2025-09-09

## 2024-09-09 ASSESSMENT — ENCOUNTER SYMPTOMS
DIZZINESS: 1
SWEATS: 1

## 2024-09-09 NOTE — PROGRESS NOTES
Pharmacy Post-Discharge Visit    Haile Mcfarland is a 73 y.o. male was referred to Clinical Pharmacy Team to complete a post-discharge medication optimization and monitoring visit.  The patient was referred for their COPD management while now recently enrolled in our [facility]. He also has diabetes which we will be monitoring as well while he is enrolled in the program.     Admission Date: 8/26/24  Discharge Date: 8/29/24    Referring Provider: Jonatan Davila MD  PCP: Mustapha Oznua MD - next visit: 9/10      Subjective   Allergies   Allergen Reactions    Penicillins Other and Unknown     From childhood. Unsure of reaction.       [facility] - [place], [place] - [address]  [address]  [facility] [address]  Phone: [phone] Fax: [phone]    [facility] - [place], [place] - [address]   [address] [place] [address]  Phone: [phone] Fax: [phone]      Medication System Management:  Affordability/Accessibility: try to enroll into PAP  Adherence/Organization: no issues       Social History     Social History Narrative    Not on file        Notable Medication changes following discharge:  Start: levofloxacin   Stop: none  Change: none    Diabetes  He presents for his initial diabetic visit. He has type 2 diabetes mellitus. His disease course has been stable. Hypoglycemia symptoms include dizziness and sweats. There are no hypoglycemic complications. Risk factors for coronary artery disease include diabetes mellitus, male sex, obesity, hypertension, sedentary lifestyle and tobacco exposure. Current diabetic treatment includes insulin injections and oral agent (monotherapy). He is compliant with treatment all of the time. When asked about meal planning, he reported none. His overall blood glucose range is 130-140 mg/dl. An ACE inhibitor/angiotensin II receptor blocker is being taken.     PULMONARY ASSESSMENT  Patient has been diagnosed  with: COPD  does see a pulmonologist  Next visit: 10/16    Current Regimen:  Trelegy   Albuterol   Duo nebs     Appropriate Inhaler Technique? yes  How often do you use your rescue inhaler? Not often     Symptom Assessment:  Current symptoms: dyspnea  Symptoms are improved  Triggers: exertion  Alleviating factors: rest, rescue inhaler     Exacerbation Hx:  When was your last hospitalization for an exacerbation? This past admisison   When was the last time you were treated with antibiotics and/or steroids? This past admission      Historical Treatment:  Symbicort   Breo     Immunization History:  Influenza: Date [9/7/2023]  PCV13: Date [n/a]  PPSV23: Date [2/27/2023]  PCV20: Date [n/a]  COVID: Date [11/11/2023]  RSV: Date [n/a]    Smoking history:  He has never smoked.     Review of Systems   Neurological:  Positive for dizziness.           Objective     There were no vitals taken for this visit.   BP Readings from Last 4 Encounters:   08/29/24 160/77   07/09/24 156/77   06/20/24 120/70   06/06/24 122/80      There were no vitals filed for this visit.     LAB  Lab Results   Component Value Date    BILITOT 0.7 08/26/2024    CALCIUM 8.5 (L) 08/29/2024    CO2 27 08/29/2024     08/29/2024    CREATININE 1.11 08/29/2024    GLUCOSE 223 (H) 08/29/2024    ALKPHOS 112 08/26/2024    K 4.0 08/29/2024    PROT 7.1 08/26/2024     08/29/2024    AST 17 08/26/2024    ALT 19 08/26/2024    BUN 44 (H) 08/29/2024    ANIONGAP 12 08/29/2024    MG 1.79 08/26/2024    PHOS 3.7 11/27/2020    ALBUMIN 4.0 08/26/2024    GFRF CANCELED 05/29/2023    GFRMALE 71 09/01/2023     Lab Results   Component Value Date    TRIG 63 05/31/2024    CHOL 94 05/31/2024    LDLCALC 54 05/31/2024    HDL 27.6 05/31/2024     Lab Results   Component Value Date    HGBA1C 8.0 (H) 05/31/2024         Current Outpatient Medications   Medication Instructions    albuterol 2.5 mg /3 mL (0.083 %) nebulizer solution USE 1 VIAL IN NEBULIZER EVERY 6 HOURS AS NEEDED FOR  "WHEEZING    amLODIPine (NORVASC) 10 mg, oral, Every evening    aspirin 81 mg EC tablet 1 tablet, oral, Daily    atorvastatin (LIPITOR) 80 mg, oral, Nightly    carvedilol (COREG) 12.5 mg, oral, 2 times daily (morning and late afternoon)    clopidogrel (PLAVIX) 75 mg, oral, Daily    Contour Next Test Strips strip USE 1 STRIP TO CHECK GLUCOSE THREE TIMES DAILY    fluticasone-umeclidin-vilanter (Trelegy Ellipta) 100-62.5-25 mcg blister with device 1 puff, inhalation, Daily, Rinse mouth with water after use to reduce aftertaste and incidence of candidiasis. Do not swallow.    hydroCHLOROthiazide (HYDRODIURIL) 25 mg, oral, Daily before breakfast    insulin glargine (Toujeo SoloStar U-300 Insulin) 300 unit/mL (1.5 mL) injection INJECT 50 UNITS SUBCUTANEOUSLY TWICE DAILY    ipratropium-albuteroL (Duo-Neb) 0.5-2.5 mg/3 mL nebulizer solution USE 1 AMPULE IN NEBULIZER EVERY 8 HOURS AS NEEDED FOR WHEEZING FOR SHORTNESS OF BREATH    losartan (COZAAR) 50 mg, oral, 2 times daily    Lumigan 0.01 % ophthalmic solution INSTILL 1 DROP INTO RIGHT EYE AT BEDTIME    magnesium oxide (Mag-Ox) 400 mg (241.3 mg magnesium) tablet 1 tablet, oral, Daily    metFORMIN (GLUCOPHAGE) 1,000 mg, oral, 2 times daily (morning and late afternoon)    nitroglycerin (Nitrostat) 0.4 mg SL tablet DISSOLVE ONE TABLET UNDER THE TONGUE EVERY 5 MINUTES AS NEEDED FOR CHEST PAIN.  DO NOT EXCEED A TOTAL OF 3 DOSES IN 15 MINUTES    NovoLOG FlexPen U-100 Insulin 100 unit/mL (3 mL) pen INJECT 25 UNITS SUBCUTANEOUSLY THREE TIMES DAILY BEFORE MEAL(S)    pen needle, diabetic 32 gauge x 5/32\" needle USE 1 NEEDLE 3 TO 4 TIMES DAILY AS NEEDED    prednisoLONE acetate (Pred-Forte) 1 % ophthalmic suspension 1 drop, Left Eye, 2 times daily    Simbrinza 1-0.2 % drops,suspension ophthalmic suspension 1 drop, Right Eye, 2 times daily        HISTORICAL PHARMACOTHERAPY  N/a    DRUG INTERACTIONS  None at time of review      Assessment/Plan   Problem List Items Addressed This Visit  "      Chronic obstructive pulmonary disease (Multi)    Type 2 diabetes mellitus 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 edema (Multi)     COPD  Does not wear oxygen, not on brandee and no cpap or bipap during the night   He has never smoked but he does have a 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   Completed remaining course of levofloxacin   Using trelegy now daily for his maintenance inhaler   Has albuterol and breathing treatments as needed   DM  Most recent A1c was 8.0% on 5/31  He does check his sugars 3x daily   Fasting today was 127  Highest he has seen since being home was around 180  He has had some hypo events too in the upper 60's and does get symptomatic too  Notices that it does usually happen when does not eat as much with his dinners the nights before   Currently doing 50 units of toujeo twice daily  Has novolog per SS with meals but has not had to use since being home   Also has been taking metformin 500mg twice daily --> discussed to stop this and then see how sugars are this next week   HTN  Does have his own BP cuff and checks daily   Says he has been running around 120's/60's since being home   Told to keep log for future visits   Taking amlodipine, coreg, losartan and hydrochlorothiazide   Also on plavix, aspirin and statin for his multiple stents     Follow Up: 1 week     Continue all meds under the continuation of care with the referring provider and clinical pharmacy team.    Tito Collazo PharmD     Verbal consent to manage patient's drug therapy was obtained from the patient. They were informed they may decline to participate or withdraw from participation in pharmacy services at any time.

## 2024-09-09 NOTE — PROGRESS NOTES
Pt phoned in said he wasn't to dis enroll. He said its just not working for him. Today on [facility] we told him to hold metformin for low BG. He said today at 1630 that its not working and BG is high. He said he prefers to just go through his PCP from now on.   Pt dis enrolled.

## 2024-09-09 NOTE — PROGRESS NOTES
[facility] Call Note:   Spoke to patient, he reports getting better, not at 100% yet, but improving. He sees PCP tomorrow 9/10. And is using his Trelegy as ordered and does state cost is high. PAP reviewed. He may be over income with wife jointly limit, but he will check.   Finished antibiotic. Reports BG. Meds reviewed.   Has glucose tabs for lows at home, highest BG he reports is 185.   Provider advised holding metformin.   Room air.   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  Next [facility] scheduled.     Pt Education: POC  Barriers: na  Topics for Daily Review: POC  Pt demonstrates clear understanding: Yes    Daily Weight:  There were no vitals filed for this visit.   Last 3 Weights:  Wt Readings from Last 7 Encounters:   08/26/24 127 kg (280 lb)   07/09/24 133 kg (293 lb 3.4 oz)   06/20/24 134 kg (296 lb)   06/06/24 134 kg (296 lb)   04/11/24 135 kg (297 lb)   03/14/24 135 kg (297 lb)   03/08/24 135 kg (297 lb)       Masimo Device: No   Masimo Clinical Impression: na    Virtual Visits--Scheduled (Most Recent Date at Top)  Follow up Appointments  Recent Visits  Date Type Provider Dept   08/13/24 Clinical Support Nanette Loya MA Do [id number] [facility]   Showing recent visits within past 30 days and meeting all other requirements  Future Appointments  Date Type Provider Dept   09/10/24 Appointment Mustapha Ozuna MD Do [id number] [facility]   10/02/24 Appointment Mustapha Ozuna MD Do [id number] [facility]   Showing future appointments within next 90 days and meeting all other requirements       Frequency of RN Calls & Virtual Visits per Team Agreement: Healthy at Home Frequency: Daily    Medication issues Addressed (what was done): reviewed     Follow up appointments scheduled by [facility] Staff: na  Referrals made by [facility] staff: na

## 2024-09-10 ENCOUNTER — APPOINTMENT (OUTPATIENT)
Dept: PRIMARY CARE | Facility: CLINIC | Age: 74
End: 2024-09-10
Payer: MEDICARE

## 2024-09-10 VITALS
TEMPERATURE: 96.4 F | HEIGHT: 75 IN | WEIGHT: 291 LBS | DIASTOLIC BLOOD PRESSURE: 80 MMHG | BODY MASS INDEX: 36.18 KG/M2 | HEART RATE: 67 BPM | SYSTOLIC BLOOD PRESSURE: 120 MMHG

## 2024-09-10 DIAGNOSIS — E11.3293 TYPE 2 DIABETES MELLITUS WITH BOTH EYES AFFECTED BY MILD NONPROLIFERATIVE RETINOPATHY WITHOUT MACULAR EDEMA, WITHOUT LONG-TERM CURRENT USE OF INSULIN (MULTI): ICD-10-CM

## 2024-09-10 DIAGNOSIS — J18.9 PNEUMONIA OF BOTH LUNGS DUE TO INFECTIOUS ORGANISM, UNSPECIFIED PART OF LUNG: ICD-10-CM

## 2024-09-10 DIAGNOSIS — J44.1 CHRONIC OBSTRUCTIVE PULMONARY DISEASE WITH ACUTE EXACERBATION (MULTI): ICD-10-CM

## 2024-09-10 DIAGNOSIS — I10 ESSENTIAL HYPERTENSION, BENIGN: Primary | ICD-10-CM

## 2024-09-10 PROCEDURE — 99495 TRANSJ CARE MGMT MOD F2F 14D: CPT | Performed by: INTERNAL MEDICINE

## 2024-09-10 PROCEDURE — 1159F MED LIST DOCD IN RCRD: CPT | Performed by: INTERNAL MEDICINE

## 2024-09-10 PROCEDURE — 3074F SYST BP LT 130 MM HG: CPT | Performed by: INTERNAL MEDICINE

## 2024-09-10 PROCEDURE — 3008F BODY MASS INDEX DOCD: CPT | Performed by: INTERNAL MEDICINE

## 2024-09-10 PROCEDURE — 1036F TOBACCO NON-USER: CPT | Performed by: INTERNAL MEDICINE

## 2024-09-10 PROCEDURE — 3048F LDL-C <100 MG/DL: CPT | Performed by: INTERNAL MEDICINE

## 2024-09-10 PROCEDURE — 1160F RVW MEDS BY RX/DR IN RCRD: CPT | Performed by: INTERNAL MEDICINE

## 2024-09-10 PROCEDURE — 1123F ACP DISCUSS/DSCN MKR DOCD: CPT | Performed by: INTERNAL MEDICINE

## 2024-09-10 PROCEDURE — 1157F ADVNC CARE PLAN IN RCRD: CPT | Performed by: INTERNAL MEDICINE

## 2024-09-10 PROCEDURE — 1111F DSCHRG MED/CURRENT MED MERGE: CPT | Performed by: INTERNAL MEDICINE

## 2024-09-10 PROCEDURE — 4010F ACE/ARB THERAPY RXD/TAKEN: CPT | Performed by: INTERNAL MEDICINE

## 2024-09-10 PROCEDURE — 3052F HG A1C>EQUAL 8.0%<EQUAL 9.0%: CPT | Performed by: INTERNAL MEDICINE

## 2024-09-10 PROCEDURE — 3079F DIAST BP 80-89 MM HG: CPT | Performed by: INTERNAL MEDICINE

## 2024-09-10 RX ORDER — METFORMIN HYDROCHLORIDE 1000 MG/1
500 TABLET ORAL
Start: 2024-09-10

## 2024-09-10 RX ORDER — CARVEDILOL 12.5 MG/1
12.5 TABLET ORAL
Qty: 180 TABLET | Refills: 3 | Status: SHIPPED | OUTPATIENT
Start: 2024-09-10

## 2024-09-10 ASSESSMENT — ENCOUNTER SYMPTOMS
DIZZINESS: 0
CARDIOVASCULAR NEGATIVE: 1
COUGH: 1
APPETITE CHANGE: 0
FATIGUE: 0
NAUSEA: 0
WEAKNESS: 0
WOUND: 0
SHORTNESS OF BREATH: 0
ARTHRALGIAS: 1
ABDOMINAL PAIN: 0
APNEA: 0

## 2024-09-10 NOTE — PROGRESS NOTES
"Patient: Haile Mcfarland  : 1950  PCP: Mustapha Ozuna MD  MRN: [id number]  Program: Care Management  Status: Enrolled  Effective Dates: 2020 - present  Responsible Staff: Alice Salas RN  Social Determinants to be Addressed: No information to display    Transitional Care Management  Status: Enrolled  Effective Dates: 2024 - present  Responsible Staff: Odalis Montes LPN  Social Determinants to be Addressed: Physical Activity, Social Connections, Stress    Patient was hospitalized because he continued to have a shortness of breath and productive sputum, they found bibasilar infiltrates, there was no exact etiology of this pneumonia again, he has a tendency to get pulmonary infiltrates, this was the longest time he stayed out of hospital, it was 2 years in between since he was hospitalized, treated with antibiotics, steroids, improved, when he was discharged he was leukocytotic.  He feels much better, he did not require any oxygen, all the report and information including chest radiograph were reviewed, today he came by himself, he was not hypoxic, he was walking by himself.  List of medications at the time of discharge were reviewed, nurse called information was reviewed.     Haile Mcfarland is a 73 y.o. male presenting today for follow-up after being discharged from the hospital 12 days ago. The main problem requiring admission was Pneumonia. The discharge summary and/or Transitional Care Management documentation was reviewed. Medication reconciliation was performed as indicated via the \"Yosvany as Reviewed\" timestamp.     Haile Mcfarland was contacted by Transitional Care Management services two days after his discharge. This encounter and supporting documentation was reviewed.    Review of Systems   Constitutional:  Negative for appetite change and fatigue.   Respiratory:  Positive for cough. Negative for apnea and shortness of breath.    Cardiovascular: Negative.    Gastrointestinal:  Negative " "for abdominal pain and nausea.   Musculoskeletal:  Positive for arthralgias.   Skin:  Negative for wound.   Neurological:  Negative for dizziness and weakness.       /80 (BP Location: Left arm, Patient Position: Sitting, BP Cuff Size: Adult)   Pulse 67   Temp 35.8 °C (96.4 °F) (Temporal)   Ht 1.905 m (6' 3\")   Wt 132 kg (291 lb)   BMI 36.37 kg/m²     Physical Exam  Constitutional:       Appearance: Normal appearance. He is obese.   HENT:      Head: Normocephalic.   Eyes:      Conjunctiva/sclera: Conjunctivae normal.   Cardiovascular:      Rate and Rhythm: Normal rate and regular rhythm.      Pulses: Normal pulses.      Heart sounds: Normal heart sounds.   Pulmonary:      Effort: Pulmonary effort is normal.      Breath sounds: Normal breath sounds.   Abdominal:      General: Abdomen is protuberant.      Palpations: Abdomen is soft.   Musculoskeletal:         General: No tenderness or deformity.      Cervical back: Neck supple.   Skin:     General: Skin is warm and dry.   Neurological:      General: No focal deficit present.      Mental Status: He is oriented to person, place, and time. Mental status is at baseline.   Psychiatric:         Mood and Affect: Mood normal.         The complexity of medical decision making for this patient's transitional care is moderate.    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 Pt is doing well, precautions to be taken in the future for acute ailments or acute illness and change of condition are discussed, will continue to have close follow up, medication list was reviewed and updated and necessary changes were applied.   It was a transitional care visit with the hospitalization data reviewed, longstanding diabetes fluctuating blood sugars prednisone will not help to improve glycemic control.  Pulmonary infiltrates were seen we hope it has been resolved, he has a weak lungs, he is defense mechanism in the lung is not great, he has a tendency to retain secretions, he has a tendency to have inspissated secretions and that can be the nidus for bacterial proliferation and it can cause pulmonary infiltrates.  Deep breathing exercises, upright posture, regular use of inhaler, rest of medications to be continued.  He feels better and he should be doing well, any change in the condition needs to be notified to me, he will keep his next appointment.

## 2024-09-11 ENCOUNTER — PATIENT OUTREACH (OUTPATIENT)
Dept: CARE COORDINATION | Facility: CLINIC | Age: 74
End: 2024-09-11

## 2024-09-11 ENCOUNTER — APPOINTMENT (OUTPATIENT)
Dept: CARDIOLOGY | Facility: CLINIC | Age: 74
End: 2024-09-11
Payer: MEDICARE

## 2024-09-11 VITALS
SYSTOLIC BLOOD PRESSURE: 150 MMHG | BODY MASS INDEX: 36.66 KG/M2 | WEIGHT: 293.3 LBS | DIASTOLIC BLOOD PRESSURE: 66 MMHG | HEART RATE: 64 BPM

## 2024-09-11 DIAGNOSIS — R55 SYNCOPE AND COLLAPSE: ICD-10-CM

## 2024-09-11 DIAGNOSIS — Z98.890 H/O CAROTID ENDARTERECTOMY: ICD-10-CM

## 2024-09-11 DIAGNOSIS — Z79.4 TYPE 2 DIABETES MELLITUS WITH BOTH EYES AFFECTED BY MILD NONPROLIFERATIVE RETINOPATHY WITHOUT MACULAR EDEMA, WITH LONG-TERM CURRENT USE OF INSULIN (MULTI): ICD-10-CM

## 2024-09-11 DIAGNOSIS — Z98.61 CAD S/P PERCUTANEOUS CORONARY ANGIOPLASTY: ICD-10-CM

## 2024-09-11 DIAGNOSIS — E78.2 MIXED HYPERLIPIDEMIA: ICD-10-CM

## 2024-09-11 DIAGNOSIS — J44.9 CHRONIC OBSTRUCTIVE PULMONARY DISEASE, UNSPECIFIED COPD TYPE (MULTI): ICD-10-CM

## 2024-09-11 DIAGNOSIS — E11.3293 TYPE 2 DIABETES MELLITUS WITH BOTH EYES AFFECTED BY MILD NONPROLIFERATIVE RETINOPATHY WITHOUT MACULAR EDEMA, WITH LONG-TERM CURRENT USE OF INSULIN (MULTI): ICD-10-CM

## 2024-09-11 DIAGNOSIS — I50.32 CHRONIC DIASTOLIC HEART FAILURE (MULTI): ICD-10-CM

## 2024-09-11 DIAGNOSIS — I65.23 BILATERAL CAROTID ARTERY STENOSIS: ICD-10-CM

## 2024-09-11 DIAGNOSIS — Z78.9 NEVER SMOKED TOBACCO: ICD-10-CM

## 2024-09-11 DIAGNOSIS — I25.10 CAD S/P PERCUTANEOUS CORONARY ANGIOPLASTY: ICD-10-CM

## 2024-09-11 DIAGNOSIS — I10 ESSENTIAL HYPERTENSION, BENIGN: ICD-10-CM

## 2024-09-11 PROCEDURE — 1159F MED LIST DOCD IN RCRD: CPT | Performed by: INTERNAL MEDICINE

## 2024-09-11 PROCEDURE — 3048F LDL-C <100 MG/DL: CPT | Performed by: INTERNAL MEDICINE

## 2024-09-11 PROCEDURE — 3052F HG A1C>EQUAL 8.0%<EQUAL 9.0%: CPT | Performed by: INTERNAL MEDICINE

## 2024-09-11 PROCEDURE — 1111F DSCHRG MED/CURRENT MED MERGE: CPT | Performed by: INTERNAL MEDICINE

## 2024-09-11 PROCEDURE — 1036F TOBACCO NON-USER: CPT | Performed by: INTERNAL MEDICINE

## 2024-09-11 PROCEDURE — 1157F ADVNC CARE PLAN IN RCRD: CPT | Performed by: INTERNAL MEDICINE

## 2024-09-11 PROCEDURE — 99214 OFFICE O/P EST MOD 30 MIN: CPT | Performed by: INTERNAL MEDICINE

## 2024-09-11 PROCEDURE — 4010F ACE/ARB THERAPY RXD/TAKEN: CPT | Performed by: INTERNAL MEDICINE

## 2024-09-11 PROCEDURE — 1123F ACP DISCUSS/DSCN MKR DOCD: CPT | Performed by: INTERNAL MEDICINE

## 2024-09-11 PROCEDURE — 3078F DIAST BP <80 MM HG: CPT | Performed by: INTERNAL MEDICINE

## 2024-09-11 PROCEDURE — 3077F SYST BP >= 140 MM HG: CPT | Performed by: INTERNAL MEDICINE

## 2024-09-11 NOTE — PROGRESS NOTES
Care Management:   Recently discharged from the hospital for a recent bought of pneumonia.  He is feeling better, he had no questions at this time.  His wife is going for a procedure, so that is where his main focus is now.   Will continue to follow.    Alice DAWKINS, RN, [facility]  Accountable Care Organization  O: [phone]

## 2024-09-11 NOTE — PATIENT INSTRUCTIONS
Patient to follow up in 1 year with Dr. Mario Shelton MD      No changes today.   Continue same medications and treatments.   Patient educated on proper medication use.   Patient educated on risk factor modification.   Please bring any lab results from other providers / physicians to your next appointment.     Please bring all medicines, vitamins, and herbal supplements with you when you come to the office.     Prescriptions will not be filled unless you are compliant with your follow up appointments or have a follow up appointment scheduled as per instruction of your physician. Refills should be requested at the time of your visit.    I, Milan Rock RN am scribing for and in the presence of Dr. Mario Tatum MD

## 2024-09-11 NOTE — PROGRESS NOTES
CARDIOLOGY OFFICE VISIT      CHIEF COMPLAINT      HISTORY OF PRESENT ILLNESS  The patient states he is feeling well now.  He states that a week or 2 ago he was not feeling well he had pneumonia.  He was hospitalized for 4 days at [facility].  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  He never smoked.  He denies chest discomfort or symptoms of myocardial ischemia.  He has not taken nitroglycerin.  He has some mild chronic bilateral pretibial edema.  He denies palpitations and syncope.  He denies any problem with his medication.  I did go over his lipid profile with him from May.  Cholesterol 94, HDL 28, LDL 54, triglycerides 63.    Clinical impression:  Essential hypertension obviously needs better control patient apparently took his morning medicines  CAD status post previous PCI   Carotid artery disease status post previous carotid endarterectomy right. Dr. Tomlinson managing.   History of syncope without clinical recurrences  Dyslipidemia   Diabetes myelitis type II  COPD  Obesity  Chronic diastolic congestive heart failure     Please excuse any errors in grammar or translation related to this dictation. Voice recognition software was utilized to prepare this document.    Past Medical History  History reviewed. No pertinent past medical history.    Social History  Social History     Tobacco Use    Smoking status: Never    Smokeless tobacco: Never   Vaping Use    Vaping status: Never Used   Substance Use Topics    Alcohol use: Never    Drug use: Never       Family History   No family history on file.     Allergies:  Allergies   Allergen Reactions    Penicillins Other and Unknown     From childhood. Unsure of reaction.        Outpatient Medications:  Current Outpatient Medications   Medication Instructions    albuterol (Ventolin HFA) 90 mcg/actuation inhaler 2 puffs, inhalation, Every 6 hours PRN    albuterol 2.5 mg, nebulization, Every 6 hours PRN    amLODIPine (NORVASC) 10  "mg, oral, Every evening    aspirin 81 mg EC tablet 1 tablet, oral, Daily    atorvastatin (LIPITOR) 80 mg, oral, Nightly    carvedilol (COREG) 12.5 mg, oral, 2 times daily (morning and late afternoon)    clopidogrel (PLAVIX) 75 mg, oral, Daily    Contour Next Test Strips strip USE 1 STRIP TO CHECK GLUCOSE THREE TIMES DAILY    fluticasone-umeclidin-vilanter (Trelegy Ellipta) 100-62.5-25 mcg blister with device 1 puff, inhalation, Daily, Rinse mouth with water after use to reduce aftertaste and incidence of candidiasis. Do not swallow.    hydroCHLOROthiazide (HYDRODIURIL) 25 mg, oral, Daily before breakfast    insulin glargine (Toujeo SoloStar U-300 Insulin) 300 unit/mL (1.5 mL) injection INJECT 50 UNITS SUBCUTANEOUSLY TWICE DAILY    ipratropium-albuteroL (Duo-Neb) 0.5-2.5 mg/3 mL nebulizer solution USE 1 AMPULE IN NEBULIZER EVERY 8 HOURS AS NEEDED FOR WHEEZING FOR SHORTNESS OF BREATH    losartan (COZAAR) 50 mg, oral, Daily    Lumigan 0.01 % ophthalmic solution INSTILL 1 DROP INTO RIGHT EYE AT BEDTIME    magnesium oxide (Mag-Ox) 400 mg (241.3 mg magnesium) tablet 1 tablet, oral, Daily    metFORMIN (GLUCOPHAGE) 500 mg, oral, 2 times daily (morning and late afternoon)    nitroglycerin (Nitrostat) 0.4 mg SL tablet DISSOLVE ONE TABLET UNDER THE TONGUE EVERY 5 MINUTES AS NEEDED FOR CHEST PAIN.  DO NOT EXCEED A TOTAL OF 3 DOSES IN 15 MINUTES    NovoLOG FlexPen U-100 Insulin 100 unit/mL (3 mL) pen INJECT 25 UNITS SUBCUTANEOUSLY THREE TIMES DAILY BEFORE MEAL(S)    pen needle, diabetic 32 gauge x 5/32\" needle USE 1 NEEDLE 3 TO 4 TIMES DAILY AS NEEDED    prednisoLONE acetate (Pred-Forte) 1 % ophthalmic suspension 1 drop, Left Eye, 2 times daily    Simbrinza 1-0.2 % drops,suspension ophthalmic suspension 1 drop, Right Eye, 2 times daily          REVIEW OF SYSTEMS  Review of Systems   Constitutional:        Walking cane for assistance    All other systems reviewed and are negative.        VITALS  Vitals:    09/11/24 0934   BP: " 150/66   Pulse: 64       PHYSICAL EXAM  Vitals reviewed.   Constitutional:       Appearance: Normal and healthy appearance. Well-developed and not in distress.   Eyes:      Conjunctiva/sclera: Conjunctivae normal.      Pupils: Pupils are equal, round, and reactive to light.   Neck:      Vascular: No JVR. JVD normal.   Pulmonary:      Effort: Pulmonary effort is normal.      Breath sounds: Normal breath sounds. No wheezing. No rhonchi. No rales.   Chest:      Chest wall: Not tender to palpatation.   Cardiovascular:      PMI at left midclavicular line. Normal rate. Regular rhythm. Normal S1. Normal S2.       Murmurs: There is no murmur.      No gallop.  No click. No rub.   Pulses:     Intact distal pulses.   Edema:     Ankle: bilateral trace edema of the ankle.     Feet: bilateral trace edema of the feet.  Abdominal:      Tenderness: There is no abdominal tenderness.   Musculoskeletal: Normal range of motion.         General: No tenderness.      Cervical back: Normal range of motion. Skin:     General: Skin is warm and dry.   Neurological:      General: No focal deficit present.      Mental Status: Alert and oriented to person, place and time.   Psychiatric:         Behavior: Behavior is cooperative.           ASSESSMENT AND PLAN  Diagnoses and all orders for this visit:  Essential hypertension, benign  CAD S/P percutaneous coronary angioplasty  Bilateral carotid artery stenosis  H/O carotid endarterectomy  Syncope and collapse  Chronic obstructive pulmonary disease, unspecified COPD type (Multi)  Chronic diastolic heart failure (Multi)  Mixed hyperlipidemia  Type 2 diabetes mellitus with both eyes affected by mild nonproliferative retinopathy without macular edema, with long-term current use of insulin (Multi)  BMI 36.0-36.9,adult  Never smoked tobacco      [unfilled]

## 2024-09-13 ENCOUNTER — PATIENT OUTREACH (OUTPATIENT)
Dept: PRIMARY CARE | Facility: CLINIC | Age: 74
End: 2024-09-13
Payer: MEDICARE

## 2024-09-13 NOTE — PROGRESS NOTES
Call regarding appt. with PCP on 9/10/24 after hospitalization.  At time of outreach call the patient feels as if their condition has improved since last visit.  Reviewed the PCP appointment with the pt and addressed any questions or concerns.

## 2024-09-16 ENCOUNTER — APPOINTMENT (OUTPATIENT)
Dept: CARE COORDINATION | Age: 74
End: 2024-09-16
Payer: MEDICARE

## 2024-09-18 ENCOUNTER — APPOINTMENT (OUTPATIENT)
Dept: CARDIOLOGY | Facility: CLINIC | Age: 74
End: 2024-09-18
Payer: MEDICARE

## 2024-09-23 ENCOUNTER — LAB (OUTPATIENT)
Dept: LAB | Facility: LAB | Age: 74
End: 2024-09-23
Payer: MEDICARE

## 2024-09-23 DIAGNOSIS — E11.3293 TYPE 2 DIABETES MELLITUS WITH BOTH EYES AFFECTED BY MILD NONPROLIFERATIVE RETINOPATHY WITHOUT MACULAR EDEMA, WITH LONG-TERM CURRENT USE OF INSULIN: ICD-10-CM

## 2024-09-23 DIAGNOSIS — Z79.4 TYPE 2 DIABETES MELLITUS WITH BOTH EYES AFFECTED BY MILD NONPROLIFERATIVE RETINOPATHY WITHOUT MACULAR EDEMA, WITH LONG-TERM CURRENT USE OF INSULIN: ICD-10-CM

## 2024-09-23 LAB
EST. AVERAGE GLUCOSE BLD GHB EST-MCNC: 206 MG/DL
HBA1C MFR BLD: 8.8 %

## 2024-09-23 PROCEDURE — 36415 COLL VENOUS BLD VENIPUNCTURE: CPT

## 2024-09-23 PROCEDURE — 83036 HEMOGLOBIN GLYCOSYLATED A1C: CPT

## 2024-09-24 ENCOUNTER — APPOINTMENT (OUTPATIENT)
Dept: CARDIOLOGY | Facility: CLINIC | Age: 74
End: 2024-09-24
Payer: MEDICARE

## 2024-09-30 ENCOUNTER — APPOINTMENT (OUTPATIENT)
Dept: OPHTHALMOLOGY | Facility: CLINIC | Age: 74
End: 2024-09-30
Payer: MEDICARE

## 2024-09-30 DIAGNOSIS — E11.3511 PROLIFERATIVE DIABETIC RETINOPATHY OF RIGHT EYE WITH MACULAR EDEMA ASSOCIATED WITH TYPE 2 DIABETES MELLITUS: ICD-10-CM

## 2024-09-30 DIAGNOSIS — H42 NEOVASCULAR GLAUCOMA DUE TO TYPE 2 DIABETES MELLITUS (MULTI): ICD-10-CM

## 2024-09-30 DIAGNOSIS — E11.39 NEOVASCULAR GLAUCOMA DUE TO TYPE 2 DIABETES MELLITUS (MULTI): ICD-10-CM

## 2024-09-30 DIAGNOSIS — H43.822 VITREOMACULAR TRACTION SYNDROME OF LEFT EYE: ICD-10-CM

## 2024-09-30 DIAGNOSIS — H34.8120 CENTRAL RETINAL VEIN OCCLUSION WITH MACULAR EDEMA OF LEFT EYE: Primary | ICD-10-CM

## 2024-09-30 PROCEDURE — 92134 CPTRZ OPH DX IMG PST SGM RTA: CPT | Mod: BILATERAL PROCEDURE | Performed by: OPHTHALMOLOGY

## 2024-09-30 PROCEDURE — 99213 OFFICE O/P EST LOW 20 MIN: CPT | Performed by: OPHTHALMOLOGY

## 2024-09-30 ASSESSMENT — ENCOUNTER SYMPTOMS
GASTROINTESTINAL NEGATIVE: 0
ALLERGIC/IMMUNOLOGIC NEGATIVE: 0
RESPIRATORY NEGATIVE: 0
EYES NEGATIVE: 1
NEUROLOGICAL NEGATIVE: 0
ENDOCRINE NEGATIVE: 0
CARDIOVASCULAR NEGATIVE: 0
CONSTITUTIONAL NEGATIVE: 0
HEMATOLOGIC/LYMPHATIC NEGATIVE: 0
PSYCHIATRIC NEGATIVE: 0
MUSCULOSKELETAL NEGATIVE: 0

## 2024-09-30 ASSESSMENT — REFRACTION_WEARINGRX
OD_SPHERE: +1.00
OD_CYLINDER: -0.75
OS_SPHERE: +1.00
OD_AXIS: 170
OD_ADD: +2.50
OS_CYLINDER: -1.50
OS_ADD: +2.50
OS_AXIS: 065

## 2024-09-30 ASSESSMENT — CONF VISUAL FIELD
OS_INFERIOR_TEMPORAL_RESTRICTION: 0
OD_SUPERIOR_TEMPORAL_RESTRICTION: 1
OD_SUPERIOR_NASAL_RESTRICTION: 1
OS_SUPERIOR_TEMPORAL_RESTRICTION: 0
OS_NORMAL: 1
OD_INFERIOR_NASAL_RESTRICTION: 1
OS_INFERIOR_NASAL_RESTRICTION: 0
OS_SUPERIOR_NASAL_RESTRICTION: 0
OD_INFERIOR_TEMPORAL_RESTRICTION: 1
METHOD: COUNTING FINGERS

## 2024-09-30 ASSESSMENT — PACHYMETRY
OD_CT(UM): 700
OS_CT(UM): 656

## 2024-09-30 ASSESSMENT — SLIT LAMP EXAM - LIDS
COMMENTS: NORMAL
COMMENTS: NORMAL

## 2024-09-30 ASSESSMENT — EXTERNAL EXAM - RIGHT EYE: OD_EXAM: NORMAL

## 2024-09-30 ASSESSMENT — TONOMETRY
OS_IOP_MMHG: 16
IOP_METHOD: TONOPEN
OD_IOP_MMHG: 09

## 2024-09-30 ASSESSMENT — EXTERNAL EXAM - LEFT EYE: OS_EXAM: NORMAL

## 2024-09-30 ASSESSMENT — VISUAL ACUITY
METHOD: SNELLEN - LINEAR
OD_CC: CF @ 2FT
OS_CC: 20/25-1
CORRECTION_TYPE: GLASSES

## 2024-09-30 ASSESSMENT — CUP TO DISC RATIO
OS_RATIO: 0.3
OD_RATIO: 0.5

## 2024-10-02 ENCOUNTER — APPOINTMENT (OUTPATIENT)
Dept: PRIMARY CARE | Facility: CLINIC | Age: 74
End: 2024-10-02
Payer: MEDICARE

## 2024-10-02 VITALS
HEIGHT: 75 IN | BODY MASS INDEX: 36.18 KG/M2 | DIASTOLIC BLOOD PRESSURE: 80 MMHG | WEIGHT: 291 LBS | SYSTOLIC BLOOD PRESSURE: 135 MMHG | TEMPERATURE: 97.3 F | HEART RATE: 66 BPM

## 2024-10-02 DIAGNOSIS — Z23 FLU VACCINE NEED: ICD-10-CM

## 2024-10-02 DIAGNOSIS — I25.10 CAD S/P PERCUTANEOUS CORONARY ANGIOPLASTY: Primary | ICD-10-CM

## 2024-10-02 DIAGNOSIS — J44.9 CHRONIC OBSTRUCTIVE PULMONARY DISEASE, UNSPECIFIED COPD TYPE (MULTI): ICD-10-CM

## 2024-10-02 DIAGNOSIS — E11.3293 TYPE 2 DIABETES MELLITUS WITH BOTH EYES AFFECTED BY MILD NONPROLIFERATIVE RETINOPATHY WITHOUT MACULAR EDEMA, WITH LONG-TERM CURRENT USE OF INSULIN: ICD-10-CM

## 2024-10-02 DIAGNOSIS — Z79.4 TYPE 2 DIABETES MELLITUS WITH BOTH EYES AFFECTED BY MILD NONPROLIFERATIVE RETINOPATHY WITHOUT MACULAR EDEMA, WITH LONG-TERM CURRENT USE OF INSULIN: ICD-10-CM

## 2024-10-02 DIAGNOSIS — Z98.61 CAD S/P PERCUTANEOUS CORONARY ANGIOPLASTY: Primary | ICD-10-CM

## 2024-10-02 PROCEDURE — 3075F SYST BP GE 130 - 139MM HG: CPT | Performed by: INTERNAL MEDICINE

## 2024-10-02 PROCEDURE — 1036F TOBACCO NON-USER: CPT | Performed by: INTERNAL MEDICINE

## 2024-10-02 PROCEDURE — 1159F MED LIST DOCD IN RCRD: CPT | Performed by: INTERNAL MEDICINE

## 2024-10-02 PROCEDURE — 4010F ACE/ARB THERAPY RXD/TAKEN: CPT | Performed by: INTERNAL MEDICINE

## 2024-10-02 PROCEDURE — G0008 ADMIN INFLUENZA VIRUS VAC: HCPCS | Performed by: INTERNAL MEDICINE

## 2024-10-02 PROCEDURE — 90662 IIV NO PRSV INCREASED AG IM: CPT | Performed by: INTERNAL MEDICINE

## 2024-10-02 PROCEDURE — 3052F HG A1C>EQUAL 8.0%<EQUAL 9.0%: CPT | Performed by: INTERNAL MEDICINE

## 2024-10-02 PROCEDURE — 99213 OFFICE O/P EST LOW 20 MIN: CPT | Performed by: INTERNAL MEDICINE

## 2024-10-02 PROCEDURE — 3079F DIAST BP 80-89 MM HG: CPT | Performed by: INTERNAL MEDICINE

## 2024-10-02 PROCEDURE — 3008F BODY MASS INDEX DOCD: CPT | Performed by: INTERNAL MEDICINE

## 2024-10-02 PROCEDURE — 1160F RVW MEDS BY RX/DR IN RCRD: CPT | Performed by: INTERNAL MEDICINE

## 2024-10-02 PROCEDURE — 1157F ADVNC CARE PLAN IN RCRD: CPT | Performed by: INTERNAL MEDICINE

## 2024-10-02 PROCEDURE — 3048F LDL-C <100 MG/DL: CPT | Performed by: INTERNAL MEDICINE

## 2024-10-02 PROCEDURE — 1123F ACP DISCUSS/DSCN MKR DOCD: CPT | Performed by: INTERNAL MEDICINE

## 2024-10-02 ASSESSMENT — ENCOUNTER SYMPTOMS
DEPRESSION: 0
JOINT SWELLING: 0
COUGH: 0
CONSTITUTIONAL NEGATIVE: 1
ARTHRALGIAS: 1
GASTROINTESTINAL NEGATIVE: 1
LOSS OF SENSATION IN FEET: 0
CARDIOVASCULAR NEGATIVE: 1
OCCASIONAL FEELINGS OF UNSTEADINESS: 1
NEUROLOGICAL NEGATIVE: 1
SHORTNESS OF BREATH: 1

## 2024-10-02 ASSESSMENT — PATIENT HEALTH QUESTIONNAIRE - PHQ9
1. LITTLE INTEREST OR PLEASURE IN DOING THINGS: NOT AT ALL
SUM OF ALL RESPONSES TO PHQ9 QUESTIONS 1 AND 2: 0
2. FEELING DOWN, DEPRESSED OR HOPELESS: NOT AT ALL

## 2024-10-02 NOTE — PROGRESS NOTES
Subjective   Patient ID: Haile Mcfarland is a 74 y.o. male who presents for Follow-up (3 mo fu).  HPI  Heart Problem  This is a chronic problem. The current episode started more than 1 year ago. The problem occurs intermittently. The problem has been resolved. Associated symptoms include a visual change. Pertinent negatives include no abdominal pain, chills, coughing, diaphoresis or fatigue. The treatment provided mild relief.   Diabetes  He presents for his follow-up diabetic visit. He has type 2 diabetes mellitus. No MedicAlert identification noted. His disease course has been stable. Associated symptoms include blurred vision, foot paresthesias and visual change. Pertinent negatives for diabetes include no fatigue. Symptoms are stable. Diabetic complications include heart disease and peripheral neuropathy. Risk factors for coronary artery disease include dyslipidemia, hypertension and obesity. Current diabetic treatment includes insulin injections and oral agent (monotherapy). He is compliant with treatment all of the time. Rx around 8%  COPD: Patient complains of dyspnea. Symptoms began several years ago. Symptoms chronic dyspnea does worsen with exertion. Sputum is clear in small amounts. Fever has been no fever. Patient uses 1 pillows at night. Patient can walk 500 feet before resting. Patient currently is not on home oxygen therapy.. Respiratory history: chronic bronchitis, COPD, and emphysema, had recent admission.  Past Medical History  History reviewed. No pertinent past medical history.      Social History  Social History     Tobacco Use    Smoking status: Never    Smokeless tobacco: Never   Vaping Use    Vaping status: Never Used   Substance Use Topics    Alcohol use: Never    Drug use: Never       Family History   No family history on file.    Allergies:  Allergies   Allergen Reactions    Penicillins Other and Unknown     From childhood. Unsure of reaction.        Outpatient Medications:  Current  "Outpatient Medications   Medication Instructions    albuterol (Ventolin HFA) 90 mcg/actuation inhaler 2 puffs, inhalation, Every 6 hours PRN    albuterol 2.5 mg, nebulization, Every 6 hours PRN    amLODIPine (NORVASC) 10 mg, oral, Every evening    aspirin 81 mg EC tablet 1 tablet, oral, Daily    atorvastatin (LIPITOR) 80 mg, oral, Nightly    carvedilol (COREG) 12.5 mg, oral, 2 times daily (morning and late afternoon)    clopidogrel (PLAVIX) 75 mg, oral, Daily    Contour Next Test Strips strip USE 1 STRIP TO CHECK GLUCOSE THREE TIMES DAILY    fluticasone-umeclidin-vilanter (Trelegy Ellipta) 100-62.5-25 mcg blister with device 1 puff, inhalation, Daily, Rinse mouth with water after use to reduce aftertaste and incidence of candidiasis. Do not swallow.    hydroCHLOROthiazide (HYDRODIURIL) 25 mg, oral, Daily before breakfast    insulin glargine (Toujeo SoloStar U-300 Insulin) 300 unit/mL (1.5 mL) injection INJECT 50 UNITS SUBCUTANEOUSLY TWICE DAILY    ipratropium-albuteroL (Duo-Neb) 0.5-2.5 mg/3 mL nebulizer solution USE 1 AMPULE IN NEBULIZER EVERY 8 HOURS AS NEEDED FOR WHEEZING FOR SHORTNESS OF BREATH    losartan (COZAAR) 50 mg, oral, Daily    Lumigan 0.01 % ophthalmic solution INSTILL 1 DROP INTO RIGHT EYE AT BEDTIME    magnesium oxide (Mag-Ox) 400 mg (241.3 mg magnesium) tablet 1 tablet, oral, Daily    metFORMIN (GLUCOPHAGE) 500 mg, oral, 2 times daily (morning and late afternoon)    nitroglycerin (Nitrostat) 0.4 mg SL tablet DISSOLVE ONE TABLET UNDER THE TONGUE EVERY 5 MINUTES AS NEEDED FOR CHEST PAIN.  DO NOT EXCEED A TOTAL OF 3 DOSES IN 15 MINUTES    NovoLOG FlexPen U-100 Insulin 100 unit/mL (3 mL) pen INJECT 25 UNITS SUBCUTANEOUSLY THREE TIMES DAILY BEFORE MEAL(S)    pen needle, diabetic 32 gauge x 5/32\" needle USE 1 NEEDLE 3 TO 4 TIMES DAILY AS NEEDED    prednisoLONE acetate (Pred-Forte) 1 % ophthalmic suspension 1 drop, Left Eye, 2 times daily    Simbrinza 1-0.2 % drops,suspension ophthalmic suspension 1 drop, " "Right Eye, 2 times daily        Review of Systems   Constitutional: Negative.    Eyes:  Positive for visual disturbance.   Respiratory:  Positive for shortness of breath. Negative for cough.    Cardiovascular: Negative.    Gastrointestinal: Negative.    Musculoskeletal:  Positive for arthralgias. Negative for joint swelling.   Neurological: Negative.          Objective       Physical Exam  Vitals reviewed.   Constitutional:       Appearance: Normal appearance. He is obese.   HENT:      Head: Normocephalic and atraumatic.   Eyes:      Conjunctiva/sclera: Conjunctivae normal.   Cardiovascular:      Rate and Rhythm: Normal rate and regular rhythm.      Pulses: Normal pulses.   Pulmonary:      Effort: Pulmonary effort is normal.      Breath sounds: Normal breath sounds.   Abdominal:      General: Abdomen is protuberant.      Palpations: Abdomen is soft.   Musculoskeletal:         General: Deformity present.      Cervical back: Neck supple.   Skin:     General: Skin is warm and dry.   Neurological:      General: No focal deficit present.   Psychiatric:         Mood and Affect: Mood normal.     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 It is progressive disease, use of MDI and proper technique discussed, rinse mouth after inhaled corticosteroids. Notify if progressive dyspnea or cough or lethargy, monitor oxygen saturation if " possible with home based pulse oximetry. Avoid hospitalization and call us if any flare ups are about to happen, be sure that annual flu vaccine are uptodate and review need for pneumococcal vaccine. Light to moderate low impact exercises are very helpful and deep breathing  exercises are beneficial in chronic lung diseases. He was seen by Ophthalmology, he contd to use cane.  After last hospitalization he did well, no angina, no exacerbation of COPD, no breathing compromise, no increasing purulence of the sputum, ophthalmic checkup was done, hemoglobin A1c hovers around 8, I do not think I can lower it with the extensive and high dose of insulin therapy he is on.  He is a heavyset male patient, deep breathing exercises are always encouraged in a COPD patient, we will check LDL and A1c next time, high-dose flu vaccine today, follow-up in 3 months.

## 2024-10-03 DIAGNOSIS — E11.3293 TYPE 2 DIABETES MELLITUS WITH BOTH EYES AFFECTED BY MILD NONPROLIFERATIVE RETINOPATHY WITHOUT MACULAR EDEMA, WITHOUT LONG-TERM CURRENT USE OF INSULIN: ICD-10-CM

## 2024-10-04 RX ORDER — METFORMIN HYDROCHLORIDE 1000 MG/1
500 TABLET ORAL
Qty: 90 TABLET | Refills: 3 | Status: SHIPPED | OUTPATIENT
Start: 2024-10-04 | End: 2025-10-04

## 2024-10-11 NOTE — PROGRESS NOTES
Patient: Haile Mcfarland    [id number]  : 1950 -- AGE 74 y.o.    Provider: Sharmaine Harden CMA     Location [facility]   Service Date: 10/16/24          [facility]  Follow Up Visit Note    Virtual or Telephone Consent  A telephone visit (audio only) between the patient (at the originating site) and the provider (at the distant site) was utilized to provide this telehealth service.   Verbal consent was requested and obtained from Haile Mcfarland on this date, 10/16/24 for a telehealth visit.     HISTORY OF PRESENT ILLNESS       HISTORY OF PRESENT ILLNESS   Haile Mcfarland is a 74 y.o. male with a h/o COPD, Asthma, CHF, HTN, HLD and DM2, who is a never smoker, who presents to a [facility] for a follow up evaluation for COPD/Asthma .     I have independently interviewed and examined the patient in the office and reviewed available records.    Current History    Since last visit he had an ED visit 24, admitted for 3 days for pneumonia.  He is feeling back to baseline.  Since starting Trelegy 100 he reports an improvement in his respiratory status.     24: Since last visit reports starting Trelegy 100 after our last visit, states it made a difference and improved his breathing.   Nebulizer- three times a day. Helps cough up mucus, clear to yellow in color.  Takes his time so he dose not get SOB.  Denies wheezing, chest tightness,SOB at rest and ER visits for breathing issues.  Allergies -  wears mask when cutting grass. No OTC allergy meds.    10/11/23: On today's visit, the patient reports he wento the ER for SOB and wheezing, he was admitted for 3 days. He states he had pneumonia, after reviewing ED note he was diagnosed with a COPD exacerbation not pneumonia. He was discharged home with antibiotics, steroids and breo inhaler. He ran out of the Breo inhaler, he was using once a day. He is using his nebulizer 3 times a  day. He was coughing up tan/green mucus but has decreased, he reports feeling better but not back to baseline. He reports feeling ROE sometimes. Denies wheezing, Sob at rest, chest pain, fevers and chills.     6/9/23: He had an ED visit 5/29/23 for SOB found to be having a congestive heart failure exacerbation, he was admitted for 3 days and sent home with lasix 20mg a day, he reports his bilateral leg swelling has decreased significantly in the past week. He has an appt with his cardiologist next week. He reports his respiratory status is stable, and that he is feeling better. He uses his symbicort 2 or 3 times a month and nebulizer every morning, never needs to use his proair. Occasionally coughs up clear mucus. He denies ROE unless he is really pushing himself. He wears a mask to cut his grass.     Previous pulmonary history: COPD possible asthma with airway remodeling, childhood asthma    Inhalers/nebulized medications: Symbicort, Breo, albuterol nebs    Hospitalization History: He has not been hospitalized over the last year for breathing related problem.    Sleep history: Denies snoring, apnea, feeling tired during the day or taking naps during the day.     ALLERGIES AND MEDICATIONS     ALLERGIES  Allergies   Allergen Reactions    Penicillins Other and Unknown     From childhood. Unsure of reaction.       MEDICATIONS  Current Outpatient Medications   Medication Sig Dispense Refill    albuterol (Ventolin HFA) 90 mcg/actuation inhaler Inhale 2 puffs every 6 hours if needed for wheezing or shortness of breath. 18 g 11    albuterol 2.5 mg /3 mL (0.083 %) nebulizer solution Take 3 mL (2.5 mg) by nebulization every 6 hours if needed for wheezing. 360 mL 11    amLODIPine (Norvasc) 10 mg tablet TAKE 1 TABLET BY MOUTH ONCE DAILY IN THE EVENING 90 tablet 3    aspirin 81 mg EC tablet Take 1 tablet (81 mg) by mouth once daily.      atorvastatin (Lipitor) 80 mg tablet TAKE 1 TABLET BY MOUTH ONCE DAILY AT BEDTIME 90 tablet  "3    carvedilol (Coreg) 12.5 mg tablet Take 1 tablet (12.5 mg) by mouth 2 times daily (morning and late afternoon). 180 tablet 3    clopidogrel (Plavix) 75 mg tablet Take 1 tablet by mouth once daily 90 tablet 0    Contour Next Test Strips strip USE 1 STRIP TO CHECK GLUCOSE THREE TIMES DAILY 250 each 0    fluticasone-umeclidin-vilanter (Trelegy Ellipta) 100-62.5-25 mcg blister with device Inhale 1 puff once daily. Rinse mouth with water after use to reduce aftertaste and incidence of candidiasis. Do not swallow. 1 each 6    hydroCHLOROthiazide (HYDRODiuril) 25 mg tablet TAKE 1 TABLET BY MOUTH ONCE DAILY IN THE MORNING 90 tablet 3    insulin glargine (Toujeo SoloStar U-300 Insulin) 300 unit/mL (1.5 mL) injection INJECT 50 UNITS SUBCUTANEOUSLY TWICE DAILY 36 mL 3    ipratropium-albuteroL (Duo-Neb) 0.5-2.5 mg/3 mL nebulizer solution USE 1 AMPULE IN NEBULIZER EVERY 8 HOURS AS NEEDED FOR WHEEZING FOR SHORTNESS OF BREATH 180 mL 3    losartan (Cozaar) 50 mg tablet Take 1 tablet (50 mg) by mouth once daily. 90 tablet 1    Lumigan 0.01 % ophthalmic solution INSTILL 1 DROP INTO RIGHT EYE AT BEDTIME 3 mL 0    magnesium oxide (Mag-Ox) 400 mg (241.3 mg magnesium) tablet Take 1 tablet (400 mg) by mouth once daily.      metFORMIN (Glucophage) 1,000 mg tablet Take 0.5 tablets (500 mg) by mouth 2 times daily (morning and late afternoon). 90 tablet 3    nitroglycerin (Nitrostat) 0.4 mg SL tablet DISSOLVE ONE TABLET UNDER THE TONGUE EVERY 5 MINUTES AS NEEDED FOR CHEST PAIN.  DO NOT EXCEED A TOTAL OF 3 DOSES IN 15 MINUTES 25 tablet 0    NovoLOG FlexPen U-100 Insulin 100 unit/mL (3 mL) pen INJECT 25 UNITS SUBCUTANEOUSLY THREE TIMES DAILY BEFORE MEAL(S)      pen needle, diabetic 32 gauge x 5/32\" needle USE 1 NEEDLE 3 TO 4 TIMES DAILY AS NEEDED 300 each 0    prednisoLONE acetate (Pred-Forte) 1 % ophthalmic suspension Administer 1 drop into the left eye 2 times a day. 10 mL 11    Simbrinza 1-0.2 % drops,suspension ophthalmic suspension " Administer 1 drop into the right eye 2 times a day. 10 mL 11     No current facility-administered medications for this visit.         PAST HISTORY     PAST MEDICAL HISTORY  COPD  Asthma  CHF  HTN  HLD  DM2    PAST SURGICAL HISTORY  Past Surgical History:   Procedure Laterality Date    CARDIAC ELECTROPHYSIOLOGY PROCEDURE Left 7/9/2024    Procedure: Loop Recorder Explant;  Surgeon: Brain Valiente MD;  Location: [facility];  Service: Electrophysiology;  Laterality: Left;    CATARACT EXTRACTION      CT ANGIO NECK  02/03/2021    CT NECK ANGIO W AND WO IV CONTRAST 2/3/2021 [facility] CLINICAL LEGACY    OTHER SURGICAL HISTORY  04/24/2019    Cervical laminectomy    OTHER SURGICAL HISTORY  10/21/2021    Knee replacement    OTHER SURGICAL HISTORY  10/21/2021    Percutaneous transluminal coronary angioplasty    OTHER SURGICAL HISTORY  10/21/2021    Kidney surgery    OTHER SURGICAL HISTORY  10/21/2021    Neck surgery    OTHER SURGICAL HISTORY  10/21/2021    Colonoscopy    OTHER SURGICAL HISTORY  10/21/2021    Tonsillectomy    OTHER SURGICAL HISTORY  10/21/2021    PTA carotid       IMMUNIZATION HISTORY  Immunization History   Administered Date(s) Administered    Flu vaccine, quadrivalent, high-dose, preservative free, age 65y+ (FLUZONE) 01/04/2022, 09/07/2023    Flu vaccine, trivalent, preservative free, HIGH-DOSE, age 65y+ (Fluzone) 01/16/2018, 09/26/2019, 10/08/2019, 11/15/2020, 11/16/2020, 09/18/2022, 10/02/2024    Influenza, Unspecified 08/30/2018    Influenza, seasonal, injectable 11/07/2015, 10/22/2018, 11/22/2020    Pfizer COVID-19 vaccine, 12 years and older, (30mcg/0.3mL) (Comirnaty) 11/11/2023    Pfizer Purple Cap SARS-CoV-2 03/12/2021, 04/03/2021, 10/13/2021    Pneumococcal conjugate vaccine, 13-valent (PREVNAR 13) 10/23/2018    Pneumococcal conjugate vaccine, 20-valent (PREVNAR 20) 02/27/2023    Zoster vaccine, recombinant, adult (SHINGRIX) 02/27/2023, 07/10/2023       SOCIAL HISTORY  Smoking: never  Illicit  drug use: none   Drinking alcohol: none    OCCUPATIONAL/ENVIRONMENTAL HISTORY  Previously worked as: Retired form the [employer].   Exposure to asbestos, silica, beryllium or inhaled metals.  No exposure to birds or exotic animals.    FAMILY HISTORY  No family history of pulmonary disease.  No family history of cancer.  No family history of autoimmune disorders.    REVIEW OF SYSTEMS     REVIEW OF SYSTEMS  Review of Systems    Constitutional: No fever, no chills, no night sweats.    Eyes: No double vision, no floaters, no dry eyes.   ENT: See HPI.   Neck: No neck stiffness.  Cardiovascular: No sharp chest pain, no heart racing, no leg swelling.  Respiratory: as noted in HPI.   Integumentary: No rashes or sores.  Neurological: No dizziness, no headaches. Sleeping well.  Psychiatric: No mood changes.       PHYSICAL EXAM     VITAL SIGNS:   There were no vitals filed for this visit.        CURRENT WEIGHT: There is no height or weight on file to calculate BMI.      PREVIOUS WEIGHTS:  Wt Readings from Last 3 Encounters:   10/02/24 132 kg (291 lb)   09/11/24 133 kg (293 lb 4.8 oz)   09/10/24 132 kg (291 lb)       Physical Exam    Constitutional: General appearance: Alert and oriented.  No acute distress.   Psychiatric: Intact judgement and insight.    RESULTS/DATA     Pulmonary Function Test Results                     Chest Radiograph     Chest Radiograph;  8/26/2024   INDICATION:  Chest pain.  COMPARISON:  Chest radiograph and CTA chest 5/29/2023.  ACCESSION NUMBER(S):  [id number]  ORDERING CLINICIAN:  ADALI AMAYA  TECHNIQUE:  Frontal chest was obtained at 09:29 hours.  FINDINGS:  CARDIOMEDIASTINAL SILHOUETTE:  Cardiomediastinal silhouette is normal in size and configuration.     LUNGS:  There are bilateral pulmonary infiltrates most pronounced on the  right.  There is blunting the costophrenic angles and small pleural  effusions cannot be excluded.  ABDOMEN:  No remarkable upper abdominal findings.     BONES:  No  acute osseous changes.  IMPRESSION:  Bilateral pulmonary infiltrates right greater than left.  Signed by Rafiq Templeton MD    Chest CT Scan     CT angio chest for pulmonary embolism 08/19/2023    Narrative  Interpreted By:  RENARD FREEMAN   MRN: [id number]  Patient Name: RUSTY GALARZA    STUDY:  CT ANGIO CHEST FOR PE;  8/19/2023 9:31 pm    INDICATION:  sob    COMPARISON:  CT angiogram chest 05/29/2023. Chest x-ray 08/19/2023    ACCESSION NUMBER(S):  [id number]    ORDERING CLINICIAN:  JONEL CARR    TECHNIQUE:  Helical data acquisition of the chest was obtained following the  uneventful administration of  75 milliliter OMNIPAQUE 350intravenous  contrast material. Images were reformatted in axial, coronal, and  sagittal planes. MIP images were created and reviewed.    FINDINGS:  POTENTIAL LIMITATIONS OF THE STUDY: Motion artifact.    HEART AND VESSELS:  No discrete filling defects within the main pulmonary artery or its  branches.    No thoracic aortic aneurysm or acute dissection.  The main pulmonary artery is dilated.  The heart is not enlarged.  Coronary artery calcifications are noted.  No evidence of pericardial effusion.    MEDIASTINUM AND GRETCHEN, LOWER NECK AND AXILLA:  The visualized thyroid gland is within normal limits.    No evidence of thoracic lymphadenopathy by CT criteria.    LUNGS AND AIRWAYS:  The trachea and central airways are patent.    The evaluation of the lungs is degraded by motion artifact. Mild  bilateral atelectasis/scarring. Redemonstration of trace right  pleural effusion. No focal consolidation. No pneumothorax.  Redemonstration of bilateral calcified pleural plaque.    UPPER ABDOMEN:  Calcific foci at the spleen are probably representing granulomas.  There is colonic diverticulosis    CHEST WALL AND OSSEOUS STRUCTURES:  There is mild bilateral gynecomastia.  A cardiac monitor device is noted at the soft tissues of the left  anterior chest wall.  Multilevel degenerative changes at the  spine.    Impression  1.  No evidence of pulmonary emboli.  2.  Mild bilateral atelectasis/scarring. Trace right pleural  effusion. Bilateral calcified pleural plaque.  3.  Coronary artery calcifications. Dilated main pulmonary artery,  may be seen with pulmonary arterial hypertension.      Echocardiogram     Echocardiogram 5/31/2023    [place] [id number]  Tel [phone] Fax [phone]    TRANSTHORACIC ECHOCARDIOGRAM REPORT      Patient Name:     RUSTY GALARZA   Reading Physician:  [id number] Scott Askew MD, [facility]  Study Date:       5/31/2023          Referring           JAZMYN QUINTANILLA  Physician:  MRN/PID:          [id number]           PCP:  Accession/Order#: [id number]          Department          [facility] Echo  Location:           Lab  YOB: 1950          Fellow:  Gender:           M                  Nurse:  Admit Date:       5/29/2023          Sonographer:        Re Gil [facility]  Admission Status: Inpatient -        Additional Staff:  Routine  Height:           190.00 cm          CC Report to:       [address] [place] [address]  Weight:           129.00 kg          Study Type:         Echocardiogram  BSA:              2.54 m2  Blood Pressure: 109 /54 mmHg    Diagnosis/ICD: R06.00-Dyspnea, unspecified  Indication:    Dyspnea  Procedure/CPT: Echo Complete w Full [id number]    Patient History:  Diabetes:          Yes  Pertinent History: COPD, HTN, Hyperlipidemia, CAD and PCI x5.    Study Detail: The following Echo studies were performed: 2D, M-Mode, Doppler and  color flow. Technically challenging study due to poor acoustic  windows, prominent lung artifact and body habitus. A bubble study  was not performed.      PHYSICIAN INTERPRETATION:  Left Ventricle: Left ventricular systolic function is normal, with an estimated ejection fraction of 65-70%. There are no regional wall motion abnormalities. The left ventricular cavity size is normal.  The left ventricular septal wall thickness is normal. There is normal left ventricular posterior wall thickness. Spectral Doppler shows an impaired relaxation pattern of left ventricular diastolic filling.  Left Atrium: The left atrium is mildly dilated. Left atrial volume index 32.1 mL/m2.  Right Ventricle: The right ventricle is normal in size. There is normal right ventricular global systolic function. Normal right ventricular chamber size and function.  Right Atrium: The right atrium is normal in size.  Aortic Valve: The aortic valve is trileaflet. There is minimal aortic valve cusp calcification. There is trace to mild aortic valve regurgitation. The peak instantaneous gradient of the aortic valve is 12.1 mmHg. The mean gradient of the aortic valve is 6.0 mmHg. Trivial to 1+ aortic regurgitation.  Mitral Valve: The mitral valve is normal in structure. There is trace mitral valve regurgitation.  Tricuspid Valve: The tricuspid valve is structurally normal. There is trace to mild tricuspid regurgitation. Trivial to 1+ tricuspid regurgitation with estimated RVSP 47 mmHg consistent with moderately increased right sided pressures.  Pulmonic Valve: The pulmonic valve is structurally normal. There is no indication of pulmonic valve regurgitation.  Pericardium: There is no pericardial effusion noted. There is a pericardial fat pad present.  Aorta: The aortic root is normal.  Systemic Veins: There is IVC inspiratory collapse greater than 50%.      CONCLUSIONS:  1. Left ventricular systolic function is normal with a 65-70% estimated ejection fraction.  2. Spectral Doppler shows an impaired relaxation pattern of left ventricular diastolic filling.  3. Normal right ventricular chamber size and function.  4. Trivial to 1+ tricuspid regurgitation with estimated RVSP 47 mmHg consistent with moderately increased right sided pressures.  5. Trivial to 1+ aortic regurgitation.  6. Comparison study dated January 23, 2021 showed  estimated LV ejection fraction 60 to 65% with trivial tricuspid regurgitation. RVSP was unable to be estimated.    QUANTITATIVE DATA SUMMARY:  2D MEASUREMENTS:  Normal Ranges:  Ao Root d:     3.30 cm   (2.0-3.7cm)  LAs:           4.30 cm   (2.7-4.0cm)  IVSd:          1.16 cm   (0.6-1.1cm)  LVPWd:         1.12 cm   (0.6-1.1cm)  LVIDd:         5.40 cm   (3.9-5.9cm)  LVIDs:         2.70 cm  LV Mass Index: 96.9 g/m2  LV % FS        50.0 %    LA VOLUME:  Normal Ranges:  LA Vol A4C:        74.8 ml    (22+/-6mL/m2)  LA Vol A2C:        81.6 ml  LA Vol BP:         84.4 ml  LA Vol Index A4C:  29.4ml/m2  LA Vol Index A2C:  32.1 ml/m2  LA Vol Index BP:   33.2 ml/m2  LA Area A4C:       21.8 cm2  LA Area A2C:       24.6 cm2  LA Major Axis A4C: 5.4 cm  LA Major Axis A2C: 6.3 cm  LA Volume Index:   30.7 ml/m2  LA Vol A4C:        69.0 ml  LA Vol A2C:        79.0 ml    RA VOLUME BY A/L METHOD:  Normal Ranges:  RA Vol A4C:        27.2 ml    (8.3-19.5ml)  RA Vol Index A4C:  10.7 ml/m2  RA Area A4C:       12.9 cm2  RA Major Axis A4C: 5.2 cm    LV SYSTOLIC FUNCTION BY 2D PLANIMETRY (MOD):  Normal Ranges:  EF-A4C View: 75.2 % (>=55%)  EF-A2C View: 62.7 %  EF-Biplane:  68.5 %    LV DIASTOLIC FUNCTION:  Normal Ranges:  MV Peak E:      0.81 m/s   (0.7-1.2 m/s)  MV Peak A:      0.87 m/s   (0.42-0.7 m/s)  E/A Ratio:      0.93       (1.0-2.2)  MV e'           0.11 m/s   (>8.0)  MV lateral e'   0.11 m/s  MV medial e'    0.07 m/s  E/e' Ratio:     7.35       (<8.0)  PulmV Sys Morris:  86.20 cm/s  PulmV Russell Morris: 47.60 cm/s  PulmV S/D Morris:  1.80    MITRAL VALVE:  Normal Ranges:  MV DT: 238 msec (150-240msec)    AORTIC VALVE:  Normal Ranges:  AoV Vmax:                1.74 m/s  (<=1.7m/s)  AoV Peak P.1 mmHg (<20mmHg)  AoV Mean P.0 mmHg  (1.7-11.5mmHg)  LVOT Max Morris:            1.15 m/s  (<=1.1m/s)  AoV VTI:                 37.70 cm  (18-25cm)  LVOT VTI:                26.70 cm  LVOT Diameter:           2.00 cm    (1.8-2.4cm)  AoV Area, VTI:           2.22 cm2  (2.5-5.5cm2)  AoV Area,Vmax:           2.08 cm2  (2.5-4.5cm2)  AoV Dimensionless Index: 0.71    RIGHT VENTRICLE:  RV 1 3.9 cm  RV 2 3.3 cm  RV 3 7.2 cm    TRICUSPID VALVE/RVSP:  Normal Ranges:  Peak TR Velocity: 3.34 m/s  RV Syst Pressure: 47.6 mmHg (< 30mmHg)    PULMONIC VALVE:  Normal Ranges:  PV Accel Time: 98 msec  (>120ms)  PV Max Omrris:    1.3 m/s  (0.6-0.9m/s)  PV Max P.2 mmHg  PV Mean P.0 mmHg  PV VTI:        30.90 cm    Pulmonary Veins:  PulmV Russell Morris: 47.60 cm/s  PulmV S/D Morris:  1.80  PulmV Sys Morris:  86.20 cm/s      [id number] Scott Askew MD, FACC  Electronically signed on 2023 at 12:58:23 PM      ASSESSMENT/PLAN     Mr. Mcfarland is a 74 y.o. male with a h/o COPD, Asthma, CHF, HTN, HLD and DM2, who is a never smoker, who presents to a [facility] for a follow up evaluation for COPD/Asthma .     *Bilateral calcified pleural plaque on 23 CT/PE *    Problem List and Orders  Diagnoses and all orders for this visit:  Chronic bronchitis, unspecified chronic bronchitis type (CMS/HCC)  -     fluticasone-umeclidin-vilanter (Trelegy Ellipta) 100-62.5-25 mcg blister with device; Inhale 1 puff once daily. RINSE  MOUTH AFTER EACH USE TO AVOID ORAL THRUSH.  Obstructive airway disease (CMS/HCC)  -     Aerosol Machine for home use - Purchase  Pneumonia due to infectious organism, unspecified laterality, unspecified part of lung  -     XR chest 2 views; Future    Assessment and Plan / Recommendations:  Problem List Items Addressed This Visit    None     COPD/Asthma overlap; (severe obstruction) with airway remodeling  - Continue Trelegy 100- 1 puff once day - rinse mouth after each use  - Continue albuterol HFA inhaler or albuterol nebulizer every 4-6 hours as needed     Follow in 6 months or sooner if needed     If you have any questions please call the office [phone]    Thank you for visiting the Pulmonary  clinic today!   Mary Beth Vo CNP  [phone]

## 2024-10-16 ENCOUNTER — PATIENT OUTREACH (OUTPATIENT)
Dept: PRIMARY CARE | Facility: CLINIC | Age: 74
End: 2024-10-16

## 2024-10-16 ENCOUNTER — APPOINTMENT (OUTPATIENT)
Dept: PULMONOLOGY | Facility: CLINIC | Age: 74
End: 2024-10-16
Payer: MEDICARE

## 2024-10-16 VITALS — WEIGHT: 285 LBS | BODY MASS INDEX: 35.43 KG/M2 | HEIGHT: 75 IN

## 2024-10-16 DIAGNOSIS — J44.9 OBSTRUCTIVE AIRWAY DISEASE (MULTI): ICD-10-CM

## 2024-10-16 DIAGNOSIS — J44.9 CHRONIC OBSTRUCTIVE PULMONARY DISEASE, UNSPECIFIED COPD TYPE (MULTI): Primary | ICD-10-CM

## 2024-10-16 PROCEDURE — 3008F BODY MASS INDEX DOCD: CPT

## 2024-10-16 PROCEDURE — 1036F TOBACCO NON-USER: CPT

## 2024-10-16 PROCEDURE — 99214 OFFICE O/P EST MOD 30 MIN: CPT

## 2024-10-16 PROCEDURE — 1123F ACP DISCUSS/DSCN MKR DOCD: CPT

## 2024-10-16 PROCEDURE — 1159F MED LIST DOCD IN RCRD: CPT

## 2024-10-16 PROCEDURE — 3052F HG A1C>EQUAL 8.0%<EQUAL 9.0%: CPT

## 2024-10-16 PROCEDURE — 4010F ACE/ARB THERAPY RXD/TAKEN: CPT

## 2024-10-16 PROCEDURE — 1157F ADVNC CARE PLAN IN RCRD: CPT

## 2024-10-16 PROCEDURE — 3048F LDL-C <100 MG/DL: CPT

## 2024-10-16 PROCEDURE — 1126F AMNT PAIN NOTED NONE PRSNT: CPT

## 2024-10-16 ASSESSMENT — ENCOUNTER SYMPTOMS
LOSS OF SENSATION IN FEET: 0
DEPRESSION: 0
OCCASIONAL FEELINGS OF UNSTEADINESS: 0

## 2024-10-16 ASSESSMENT — PAIN SCALES - GENERAL: PAINLEVEL_OUTOF10: 0-NO PAIN

## 2024-10-16 NOTE — PROGRESS NOTES
Successful outreach to patient regarding hospitalization as patient continues TCM program.   At time of outreach call the patient feels as if their condition has improved since initial visit with PCP or specialist. Questions or concerns addressed at this time with patient.   Provided contact information to patient if any further non-emergent needs arise.

## 2024-10-20 ENCOUNTER — APPOINTMENT (OUTPATIENT)
Dept: RADIOLOGY | Facility: HOSPITAL | Age: 74
End: 2024-10-20
Payer: MEDICARE

## 2024-10-20 ENCOUNTER — APPOINTMENT (OUTPATIENT)
Dept: CARDIOLOGY | Facility: HOSPITAL | Age: 74
End: 2024-10-20
Payer: MEDICARE

## 2024-10-20 ENCOUNTER — HOSPITAL ENCOUNTER (EMERGENCY)
Facility: HOSPITAL | Age: 74
Discharge: HOME | End: 2024-10-20
Attending: EMERGENCY MEDICINE
Payer: MEDICARE

## 2024-10-20 VITALS
RESPIRATION RATE: 18 BRPM | OXYGEN SATURATION: 99 % | TEMPERATURE: 97.9 F | DIASTOLIC BLOOD PRESSURE: 71 MMHG | BODY MASS INDEX: 35.43 KG/M2 | SYSTOLIC BLOOD PRESSURE: 154 MMHG | HEART RATE: 64 BPM | WEIGHT: 285 LBS | HEIGHT: 75 IN

## 2024-10-20 DIAGNOSIS — R42 DIZZINESS: Primary | ICD-10-CM

## 2024-10-20 DIAGNOSIS — G25.0 ESSENTIAL TREMOR: ICD-10-CM

## 2024-10-20 LAB
ALBUMIN SERPL BCP-MCNC: 3.7 G/DL (ref 3.4–5)
ALP SERPL-CCNC: 101 U/L (ref 33–136)
ALT SERPL W P-5'-P-CCNC: 15 U/L (ref 10–52)
ANION GAP SERPL CALC-SCNC: 10 MMOL/L (ref 10–20)
AST SERPL W P-5'-P-CCNC: 12 U/L (ref 9–39)
ATRIAL RATE: 61 BPM
BASOPHILS # BLD AUTO: 0.07 X10*3/UL (ref 0–0.1)
BASOPHILS NFR BLD AUTO: 0.6 %
BILIRUB SERPL-MCNC: 0.9 MG/DL (ref 0–1.2)
BNP SERPL-MCNC: 93 PG/ML (ref 0–99)
BUN SERPL-MCNC: 27 MG/DL (ref 6–23)
CALCIUM SERPL-MCNC: 8.5 MG/DL (ref 8.6–10.3)
CARDIAC TROPONIN I PNL SERPL HS: 8 NG/L (ref 0–20)
CARDIAC TROPONIN I PNL SERPL HS: 8 NG/L (ref 0–20)
CHLORIDE SERPL-SCNC: 103 MMOL/L (ref 98–107)
CO2 SERPL-SCNC: 29 MMOL/L (ref 21–32)
CREAT SERPL-MCNC: 1.16 MG/DL (ref 0.5–1.3)
EGFRCR SERPLBLD CKD-EPI 2021: 66 ML/MIN/1.73M*2
EOSINOPHIL # BLD AUTO: 0.61 X10*3/UL (ref 0–0.4)
EOSINOPHIL NFR BLD AUTO: 5.1 %
ERYTHROCYTE [DISTWIDTH] IN BLOOD BY AUTOMATED COUNT: 15.7 % (ref 11.5–14.5)
GLUCOSE SERPL-MCNC: 104 MG/DL (ref 74–99)
HCT VFR BLD AUTO: 36 % (ref 41–52)
HGB BLD-MCNC: 12 G/DL (ref 13.5–17.5)
IMM GRANULOCYTES # BLD AUTO: 0.09 X10*3/UL (ref 0–0.5)
IMM GRANULOCYTES NFR BLD AUTO: 0.8 % (ref 0–0.9)
INR PPP: 1.1 (ref 0.9–1.1)
LACTATE SERPL-SCNC: 1.4 MMOL/L (ref 0.4–2)
LYMPHOCYTES # BLD AUTO: 1.4 X10*3/UL (ref 0.8–3)
LYMPHOCYTES NFR BLD AUTO: 11.8 %
MAGNESIUM SERPL-MCNC: 1.66 MG/DL (ref 1.6–2.4)
MCH RBC QN AUTO: 29.6 PG (ref 26–34)
MCHC RBC AUTO-ENTMCNC: 33.3 G/DL (ref 32–36)
MCV RBC AUTO: 89 FL (ref 80–100)
MONOCYTES # BLD AUTO: 1.15 X10*3/UL (ref 0.05–0.8)
MONOCYTES NFR BLD AUTO: 9.7 %
NEUTROPHILS # BLD AUTO: 8.54 X10*3/UL (ref 1.6–5.5)
NEUTROPHILS NFR BLD AUTO: 72 %
NRBC BLD-RTO: 0 /100 WBCS (ref 0–0)
P AXIS: 47 DEGREES
P OFFSET: 154 MS
P ONSET: 122 MS
PLATELET # BLD AUTO: 305 X10*3/UL (ref 150–450)
POTASSIUM SERPL-SCNC: 3.8 MMOL/L (ref 3.5–5.3)
PR INTERVAL: 188 MS
PROT SERPL-MCNC: 6.4 G/DL (ref 6.4–8.2)
PROTHROMBIN TIME: 12.2 SECONDS (ref 9.8–12.8)
Q ONSET: 216 MS
QRS COUNT: 10 BEATS
QRS DURATION: 130 MS
QT INTERVAL: 416 MS
QTC CALCULATION(BAZETT): 418 MS
QTC FREDERICIA: 418 MS
R AXIS: -23 DEGREES
RBC # BLD AUTO: 4.06 X10*6/UL (ref 4.5–5.9)
SODIUM SERPL-SCNC: 138 MMOL/L (ref 136–145)
T AXIS: 48 DEGREES
T OFFSET: 424 MS
VENTRICULAR RATE: 61 BPM
WBC # BLD AUTO: 11.9 X10*3/UL (ref 4.4–11.3)

## 2024-10-20 PROCEDURE — 71045 X-RAY EXAM CHEST 1 VIEW: CPT | Performed by: RADIOLOGY

## 2024-10-20 PROCEDURE — 70450 CT HEAD/BRAIN W/O DYE: CPT

## 2024-10-20 PROCEDURE — 99285 EMERGENCY DEPT VISIT HI MDM: CPT | Mod: 25

## 2024-10-20 PROCEDURE — 84484 ASSAY OF TROPONIN QUANT: CPT | Performed by: EMERGENCY MEDICINE

## 2024-10-20 PROCEDURE — 83605 ASSAY OF LACTIC ACID: CPT | Performed by: EMERGENCY MEDICINE

## 2024-10-20 PROCEDURE — 85610 PROTHROMBIN TIME: CPT | Performed by: EMERGENCY MEDICINE

## 2024-10-20 PROCEDURE — 36415 COLL VENOUS BLD VENIPUNCTURE: CPT | Performed by: EMERGENCY MEDICINE

## 2024-10-20 PROCEDURE — 2500000001 HC RX 250 WO HCPCS SELF ADMINISTERED DRUGS (ALT 637 FOR MEDICARE OP): Performed by: EMERGENCY MEDICINE

## 2024-10-20 PROCEDURE — 85025 COMPLETE CBC W/AUTO DIFF WBC: CPT | Performed by: EMERGENCY MEDICINE

## 2024-10-20 PROCEDURE — 82607 VITAMIN B-12: CPT | Performed by: STUDENT IN AN ORGANIZED HEALTH CARE EDUCATION/TRAINING PROGRAM

## 2024-10-20 PROCEDURE — 84443 ASSAY THYROID STIM HORMONE: CPT | Performed by: STUDENT IN AN ORGANIZED HEALTH CARE EDUCATION/TRAINING PROGRAM

## 2024-10-20 PROCEDURE — 80053 COMPREHEN METABOLIC PANEL: CPT | Performed by: EMERGENCY MEDICINE

## 2024-10-20 PROCEDURE — 83735 ASSAY OF MAGNESIUM: CPT | Performed by: EMERGENCY MEDICINE

## 2024-10-20 PROCEDURE — 71045 X-RAY EXAM CHEST 1 VIEW: CPT

## 2024-10-20 PROCEDURE — 93005 ELECTROCARDIOGRAM TRACING: CPT

## 2024-10-20 PROCEDURE — 83880 ASSAY OF NATRIURETIC PEPTIDE: CPT | Performed by: EMERGENCY MEDICINE

## 2024-10-20 RX ORDER — MECLIZINE HCL 12.5 MG 12.5 MG/1
25 TABLET ORAL ONCE
Status: COMPLETED | OUTPATIENT
Start: 2024-10-20 | End: 2024-10-20

## 2024-10-20 ASSESSMENT — PAIN - FUNCTIONAL ASSESSMENT
PAIN_FUNCTIONAL_ASSESSMENT: 0-10

## 2024-10-20 ASSESSMENT — LIFESTYLE VARIABLES
EVER HAD A DRINK FIRST THING IN THE MORNING TO STEADY YOUR NERVES TO GET RID OF A HANGOVER: NO
HAVE PEOPLE ANNOYED YOU BY CRITICIZING YOUR DRINKING: NO
TOTAL SCORE: 0
HAVE YOU EVER FELT YOU SHOULD CUT DOWN ON YOUR DRINKING: NO
EVER FELT BAD OR GUILTY ABOUT YOUR DRINKING: NO

## 2024-10-20 ASSESSMENT — PAIN SCALES - GENERAL
PAINLEVEL_OUTOF10: 0 - NO PAIN

## 2024-10-20 NOTE — ED PROVIDER NOTES
HPI   Chief Complaint   Patient presents with    Dizziness       HPI: 74-year-old male presents stating that he feels like he has been spinning.  He states it is worse with movement.  He states that he noticed it this morning but he is not sure what time.  He did not hit his head.  He states he had a history of previous small strokes.  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  He also has a history of COPD but he is not home O2 dependent.  He did not fall.  No difficulty speaking or swallowing.    Family HX: Denies any significant/pertinent family history.  Social Hx: Denies ETOH or drug use.  Review of systems:  Gen.: No weight loss, fatigue, anorexia, insomnia, fever.   Eyes: No vision loss, double vision, drainage, eye pain.   ENT: No pharyngitis, dry mouth.   Cardiac: No chest pain, palpitations, syncope, near syncope.   Pulmonary: No shortness of breath, cough, hemoptysis.   Heme/lymph: No swollen glands, fever, bleeding.   GI: No abdominal pain, change in bowel habits, melena, hematemesis, hematochezia, nausea, vomiting, diarrhea.   : No discharge, dysuria, frequency, urgency, hematuria.   Musculoskeletal: No limb pain, joint pain, joint swelling.   Skin: No rashes.   Psych: No depression, anxiety, suicidality, homicidality.   Review of systems is otherwise negative unless stated above or in history of present illness.    Physical Exam:    Appearance: Alert, oriented , cooperative,  in no acute distress. Well nourished & well hydrated.    Skin: Intact,  dry skin, no lesions, rash, petechiae or purpura.     Eyes: PERRLA, EOMs intact,  Conjunctiva pink with no redness or exudates. Eyelids without lesions. No scleral icterus.     ENT: Hearing grossly intact. External auditory canals patent, tympanic membranes intact with visible landmarks. Nares patent, mucus membranes moist. Dentition without lesions. Pharynx clear, uvula midline.     Neck: Supple, without meningismus. Thyroid  "not palpable. Trachea at midline. No lymphadenopathy.    Pulmonary: Clear bilaterally with good chest wall excursion. No rales, rhonchi or wheezing. No accessory muscle use or stridor.    Cardiac: Normal S1, S2 without murmur, rub, gallop or extrasystole. No JVD, Carotids without bruits.    Abdomen: Soft, nontender, active bowel sounds.  No palpable organomegaly.  No rebound or guarding.  No CVA tenderness.    Genitourinary: Exam deferred.    Musculoskeletal: Full range of motion. no pain, edema, or deformity. Pulses full and equal. No cyanosis, clubbing, or edema.    Neurological:   normal sensation, no weakness, no focal findings identified.  Patient has diminished baseline vision in the left eye.  He states that he is \"blind as a bat.  He has a normal test of skew with this consideration.  His NIH stroke scale is otherwise unremarkable except for the loss of vision in the right eye    Psychiatric: Appropriate mood and affect.     Medical Decision-Making:  Testing: EKG was obtained which is interpreted by me shows a sinus rhythm LVH rate of 61 without evidence of obvious ST elevations or T wave inversions to indicate acute ischemia or infarct.  CT of the head was obtained and read by radiology as no acute findings.  Troponin is negative x 2.  Patient was given meclizine.  On reevaluation he states the dizziness has resolved.  He ambulates with a narrow-based and steady gait.  He actually stated that he was able to \"drag the nurse around the department\".  Labs also reveal a glucose of 104 without signs of DKA.  A normal magnesium.  Normal INR.  A normal lactate.  A white count of 11 with a hemoglobin of 12.  She did not have any chest pain or shortness of breath.  No swelling in his legs.  No calf pain or tenderness.  My suspicion is low that this is PE.  I reevaluation he states he feels improved and would like to go home.  Therefore at this time patient will be given a prescription for meclizine with sedation " precautions and a referral to neurology.  Turn for worsening symptoms, dizziness not improved with medication, difficulty speaking swallowing, difficulty ambulating or any other concerns including one-sided weakness  Treatment:   Reevaluation:   Plan: Homegoing. Discussed differential. Will follow-up with the primary physician in the next 2-3 days. Return if worse. They understand return precautions and discharge instructions. Patient and family/friend/caregiver are in agreement with this plan.   Impression:   1.  Venous  2.    Labs Reviewed  CBC WITH AUTO DIFFERENTIAL - Abnormal     WBC                           11.9 (*)               nRBC                          0.0                    RBC                           4.06 (*)               Hemoglobin                    12.0 (*)               Hematocrit                    36.0 (*)               MCV                           89                     MCH                           29.6                   MCHC                          33.3                   RDW                           15.7 (*)               Platelets                     305                    Neutrophils %                 72.0                   Immature Granulocytes %, Automated   0.8                    Lymphocytes %                 11.8                   Monocytes %                   9.7                    Eosinophils %                 5.1                    Basophils %                   0.6                    Neutrophils Absolute          8.54 (*)               Immature Granulocytes Absolute, Au*   0.09                   Lymphocytes Absolute          1.40                   Monocytes Absolute            1.15 (*)               Eosinophils Absolute          0.61 (*)               Basophils Absolute            0.07                COMPREHENSIVE METABOLIC PANEL - Abnormal     Glucose                       104 (*)                Sodium                        138                    Potassium                      3.8                    Chloride                      103                    Bicarbonate                   29                     Anion Gap                     10                     Urea Nitrogen                 27 (*)                 Creatinine                    1.16                   eGFR                          66                     Calcium                       8.5 (*)                Albumin                       3.7                    Alkaline Phosphatase          101                    Total Protein                 6.4                    AST                           12                     Bilirubin, Total              0.9                    ALT                           15                  MAGNESIUM - Normal     Magnesium                     1.66                LACTATE - Normal     Lactate                       1.4                      Narrative: Venipuncture immediately after or during the administration of Metamizole may lead to falsely low results. Testing should be performed immediately prior to Metamizole dosing.  PROTIME-INR - Normal     Protime                       12.2                   INR                           1.1                 B-TYPE NATRIURETIC PEPTIDE - Normal     BNP                           93                       Narrative:    <100 pg/mL - Heart failure unlikely                100-299 pg/mL - Intermediate probability of acute heart                                failure exacerbation. Correlate with clinical                                context and patient history.                  >=300 pg/mL - Heart Failure likely. Correlate with clinical                                context and patient history.                                BNP testing is performed using different testing methodology at [facility] than at other [facility]. Direct result comparisons should only be made within the same method.                   SERIAL TROPONIN-INITIAL - Normal     Troponin  I, High Sensitivity   8                        Narrative: Less than 99th percentile of normal range cutoff-                Female and children under 18 years old <14 ng/L; Male <21 ng/L: Negative                Repeat testing should be performed if clinically indicated.                                 Female and children under 18 years old 14-50 ng/L; Male 21-50 ng/L:                Consistent with possible cardiac damage and possible increased clinical                 risk. Serial measurements may help to assess extent of myocardial damage.                                 >50 ng/L: Consistent with cardiac damage, increased clinical risk and                myocardial infarction. Serial measurements may help assess extent of                 myocardial damage.                                  NOTE: Children less than 1 year old may have higher baseline troponin                 levels and results should be interpreted in conjunction with the overall                 clinical context.                                 NOTE: Troponin I testing is performed using a different                 testing methodology at [facility] than at other                 [facility]. Direct result comparisons should only                 be made within the same method.  SERIAL TROPONIN, 1 HOUR - Normal     Troponin I, High Sensitivity   8                        Narrative: Less than 99th percentile of normal range cutoff-                Female and children under 18 years old <14 ng/L; Male <21 ng/L: Negative                Repeat testing should be performed if clinically indicated.                                 Female and children under 18 years old 14-50 ng/L; Male 21-50 ng/L:                Consistent with possible cardiac damage and possible increased clinical                 risk. Serial measurements may help to assess extent of myocardial damage.                                 >50 ng/L: Consistent with cardiac damage,  increased clinical risk and                myocardial infarction. Serial measurements may help assess extent of                 myocardial damage.                                  NOTE: Children less than 1 year old may have higher baseline troponin                 levels and results should be interpreted in conjunction with the overall                 clinical context.                                 NOTE: Troponin I testing is performed using a different                 testing methodology at [facility] than at other                 [facility] hospitals. Direct result comparisons should only                 be made within the same method.  TROPONIN SERIES- (INITIAL, 1 HR)     CT head wo IV contrast   Final Result    No evidence of acute cortical infarct or intracranial hemorrhage.          No evidence of intracranial hemorrhage or displaced skull fracture.          MACRO:    None.          Signed by: Nanette Contreras 10/20/2024 12:36 PM    Dictation workstation:   [id number]     XR chest 1 view   Final Result    Poor inspiration.          Mild cardiomegaly          Streaky density in the right lung base, which may be related to    atelectasis or infiltrate.          MACRO:    None          Signed by: Henny Chacon 10/20/2024 11:38 AM    Dictation workstation:   [id number]                    Patient History   No past medical history on file.  Past Surgical History:   Procedure Laterality Date    CARDIAC ELECTROPHYSIOLOGY PROCEDURE Left 7/9/2024    Procedure: Loop Recorder Explant;  Surgeon: Brain Valiente MD;  Location: [facility];  Service: Electrophysiology;  Laterality: Left;    CATARACT EXTRACTION      CT ANGIO NECK  02/03/2021    CT NECK ANGIO W AND WO IV CONTRAST 2/3/2021 [facility] CLINICAL LEGACY    OTHER SURGICAL HISTORY  04/24/2019    Cervical laminectomy    OTHER SURGICAL HISTORY  10/21/2021    Knee replacement    OTHER SURGICAL HISTORY  10/21/2021    Percutaneous transluminal coronary  angioplasty    OTHER SURGICAL HISTORY  10/21/2021    Kidney surgery    OTHER SURGICAL HISTORY  10/21/2021    Neck surgery    OTHER SURGICAL HISTORY  10/21/2021    Colonoscopy    OTHER SURGICAL HISTORY  10/21/2021    Tonsillectomy    OTHER SURGICAL HISTORY  10/21/2021    PTA carotid     No family history on file.  Social History     Tobacco Use    Smoking status: Never    Smokeless tobacco: Never   Vaping Use    Vaping status: Never Used   Substance Use Topics    Alcohol use: Never    Drug use: Never       Physical Exam   ED Triage Vitals [10/20/24 1058]   Temperature Heart Rate Respirations BP   36.6 °C (97.9 °F) 65 18 140/65      Pulse Ox Temp Source Heart Rate Source Patient Position   98 % Temporal Monitor Sitting      BP Location FiO2 (%)     Left arm --       Physical Exam      ED Course & MDM   Diagnoses as of 10/20/24 1317   Dizziness                 No data recorded                                 Medical Decision Making      Procedure  Procedures     Rosy Foy MD  10/20/24 1313

## 2024-10-24 ENCOUNTER — OFFICE VISIT (OUTPATIENT)
Dept: NEUROLOGY | Facility: CLINIC | Age: 74
End: 2024-10-24
Payer: MEDICARE

## 2024-10-24 ENCOUNTER — PATIENT OUTREACH (OUTPATIENT)
Dept: CARE COORDINATION | Facility: CLINIC | Age: 74
End: 2024-10-24

## 2024-10-24 VITALS
HEIGHT: 75 IN | DIASTOLIC BLOOD PRESSURE: 75 MMHG | BODY MASS INDEX: 36.93 KG/M2 | WEIGHT: 297 LBS | HEART RATE: 89 BPM | SYSTOLIC BLOOD PRESSURE: 159 MMHG

## 2024-10-24 DIAGNOSIS — R42 DIZZINESS: Primary | ICD-10-CM

## 2024-10-24 DIAGNOSIS — G25.0 ESSENTIAL TREMOR: ICD-10-CM

## 2024-10-24 LAB
TSH SERPL-ACNC: 3.52 MIU/L (ref 0.44–3.98)
VIT B12 SERPL-MCNC: 340 PG/ML (ref 211–911)

## 2024-10-24 PROCEDURE — 3052F HG A1C>EQUAL 8.0%<EQUAL 9.0%: CPT | Performed by: STUDENT IN AN ORGANIZED HEALTH CARE EDUCATION/TRAINING PROGRAM

## 2024-10-24 PROCEDURE — G2211 COMPLEX E/M VISIT ADD ON: HCPCS | Performed by: STUDENT IN AN ORGANIZED HEALTH CARE EDUCATION/TRAINING PROGRAM

## 2024-10-24 PROCEDURE — 1160F RVW MEDS BY RX/DR IN RCRD: CPT | Performed by: STUDENT IN AN ORGANIZED HEALTH CARE EDUCATION/TRAINING PROGRAM

## 2024-10-24 PROCEDURE — 1157F ADVNC CARE PLAN IN RCRD: CPT | Performed by: STUDENT IN AN ORGANIZED HEALTH CARE EDUCATION/TRAINING PROGRAM

## 2024-10-24 PROCEDURE — 3077F SYST BP >= 140 MM HG: CPT | Performed by: STUDENT IN AN ORGANIZED HEALTH CARE EDUCATION/TRAINING PROGRAM

## 2024-10-24 PROCEDURE — 4010F ACE/ARB THERAPY RXD/TAKEN: CPT | Performed by: STUDENT IN AN ORGANIZED HEALTH CARE EDUCATION/TRAINING PROGRAM

## 2024-10-24 PROCEDURE — 1036F TOBACCO NON-USER: CPT | Performed by: STUDENT IN AN ORGANIZED HEALTH CARE EDUCATION/TRAINING PROGRAM

## 2024-10-24 PROCEDURE — 3008F BODY MASS INDEX DOCD: CPT | Performed by: STUDENT IN AN ORGANIZED HEALTH CARE EDUCATION/TRAINING PROGRAM

## 2024-10-24 PROCEDURE — 1159F MED LIST DOCD IN RCRD: CPT | Performed by: STUDENT IN AN ORGANIZED HEALTH CARE EDUCATION/TRAINING PROGRAM

## 2024-10-24 PROCEDURE — 3048F LDL-C <100 MG/DL: CPT | Performed by: STUDENT IN AN ORGANIZED HEALTH CARE EDUCATION/TRAINING PROGRAM

## 2024-10-24 PROCEDURE — 3078F DIAST BP <80 MM HG: CPT | Performed by: STUDENT IN AN ORGANIZED HEALTH CARE EDUCATION/TRAINING PROGRAM

## 2024-10-24 PROCEDURE — 1123F ACP DISCUSS/DSCN MKR DOCD: CPT | Performed by: STUDENT IN AN ORGANIZED HEALTH CARE EDUCATION/TRAINING PROGRAM

## 2024-10-24 PROCEDURE — 99204 OFFICE O/P NEW MOD 45 MIN: CPT | Performed by: STUDENT IN AN ORGANIZED HEALTH CARE EDUCATION/TRAINING PROGRAM

## 2024-10-24 RX ORDER — PRIMIDONE 50 MG/1
TABLET ORAL
Qty: 120 TABLET | Refills: 5 | Status: SHIPPED | OUTPATIENT
Start: 2024-10-24

## 2024-10-24 ASSESSMENT — PATIENT HEALTH QUESTIONNAIRE - PHQ9
1. LITTLE INTEREST OR PLEASURE IN DOING THINGS: NOT AT ALL
SUM OF ALL RESPONSES TO PHQ9 QUESTIONS 1 & 2: 0
2. FEELING DOWN, DEPRESSED OR HOPELESS: NOT AT ALL

## 2024-10-24 NOTE — PROGRESS NOTES
Outreach call to patient to support a smooth transition of care from recent admission.  Left voicemail message for patient with my contact information.    Alice DAWKINS, RN, [facility]  O: [phone]

## 2024-10-24 NOTE — PROGRESS NOTES
Subjective     Haile Mcfarland is a right handed  74 y.o. year old male who presents with No chief complaint on file..   Visit type: new patient visit     He has had intermittent severe dizziness for the past 2 months. It feels like lightheadedness but sometimes spinning. It tends to last around 3-4 hours at a time, and it usually occurs after breakfast. It gets much worse when he stands and improves when he sits. It does not occur after other meals. I has occurred only a few times severely in the past month. To a lesser degree he gets milder dizziness standing up at other times. There are no associated symptoms. He denies headaches, hearing loss. He drinks around 2-3 glasses of water a day.    He has had DM2 for 11 years, poorly controlled. He denies numbness in his feet. He has mildly impaired balance, uses a cane because he is blind in the right eye due to diabetic retinopathy. He does not fall. He denies early satiety. He has a hx of small stroke and right CEA. He had a loop recorder which he was told was negative for arrhythmia.    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   Past Surgical History:   Procedure Laterality Date    CARDIAC ELECTROPHYSIOLOGY PROCEDURE Left  7/9/2024    Procedure: Loop Recorder Explant;  Surgeon: Brain Valiente MD;  Location: [facility];  Service: Electrophysiology;  Laterality: Left;    CATARACT EXTRACTION      CT ANGIO NECK  02/03/2021    CT NECK ANGIO W AND WO IV CONTRAST 2/3/2021 [facility] CLINICAL LEGACY    OTHER SURGICAL HISTORY  04/24/2019    Cervical laminectomy    OTHER SURGICAL HISTORY  10/21/2021    Knee replacement    OTHER SURGICAL HISTORY  10/21/2021    Percutaneous transluminal coronary angioplasty    OTHER SURGICAL HISTORY  10/21/2021    Kidney surgery    OTHER SURGICAL HISTORY  10/21/2021    Neck surgery    OTHER SURGICAL HISTORY  10/21/2021    Colonoscopy    OTHER SURGICAL HISTORY  10/21/2021    Tonsillectomy    OTHER SURGICAL HISTORY  10/21/2021    PTA carotid      Social History     Socioeconomic History    Marital status:      Spouse name: Not on file    Number of children: Not on file    Years of education: Not on file    Highest education level: Not on file   Occupational History    Not on file   Tobacco Use    Smoking status: Never    Smokeless tobacco: Never   Vaping Use    Vaping status: Never Used   Substance and Sexual Activity    Alcohol use: Never    Drug use: Never    Sexual activity: Defer   Other Topics Concern    Not on file   Social History Narrative    Not on file     Social Drivers of Health     Financial Resource Strain: Low Risk  (9/2/2024)    Overall Financial Resource Strain (CARDIA)     Difficulty of Paying Living Expenses: Not very hard   Food Insecurity: Unknown (9/2/2024)    Hunger Vital Sign     Worried About Running Out of Food in the Last Year: Never true     Ran Out of Food in the Last Year: Not on file   Transportation Needs: No Transportation Needs (9/2/2024)    PRAPARE - Transportation     Lack of Transportation (Medical): No     Lack of Transportation (Non-Medical): No   Physical Activity: Inactive (9/2/2024)    Exercise Vital Sign     Days of Exercise per Week: 0 days     Minutes of  "Exercise per Session: 0 min   Stress: No Stress Concern Present (9/2/2024)    [employer] - Occupational Stress Questionnaire     Feeling of Stress : Only a little   Social Connections: Unknown (9/2/2024)    Social Connection and Isolation Panel [NHANES]     Frequency of Communication with Friends and Family: Not on file     Frequency of Social Gatherings with Friends and Family: Not on file     Attends Sikhism Services: Not on file     Active Member of Clubs or Organizations: Not on file     Attends Club or Organization Meetings: Not on file     Marital Status:    Intimate Partner Violence: Not At Risk (9/2/2024)    Humiliation, Afraid, Rape, and Kick questionnaire     Fear of Current or Ex-Partner: No     Emotionally Abused: No     Physically Abused: No     Sexually Abused: No   Housing Stability: Low Risk  (9/2/2024)    Housing Stability Vital Sign     Unable to Pay for Housing in the Last Year: No     Number of Times Moved in the Last Year: 1     Homeless in the Last Year: No      No family history on file.   Patient Health Questionnaire-2 Score: 0          Review of Systems  All other system have been reviewed and are negative for complaint.    Vitals:    10/24/24 1354 10/24/24 1358   BP: 159/71 159/75   BP Location: Left arm Left arm   Patient Position: Lying Standing   BP Cuff Size: Large adult Large adult   Pulse: 75 89   Weight:  135 kg (297 lb)   Height:  1.905 m (6' 3\")     Objective   Neurological Exam  Mental Status  Awake, alert and oriented to person, place and time. Speech is normal. Language is fluent with no aphasia. Attention and concentration are normal.    Cranial Nerves  CN II: Visual fields full to confrontation.  CN III, IV, VI: Extraocular movements intact bilaterally. Normal saccades. Normal smooth pursuit. Normal lids and orbits bilaterally. Pupils equal round and reactive to light bilaterally.  CN V:  Right: Facial sensation is normal.  Left: Facial " sensation is normal on the left.  CN VII: Full and symmetric facial movement.  CN VIII: Hearing is normal.  CN IX, X: Palate elevates symmetrically  CN XI: Shoulder shrug strength is normal.  CN XII: Tongue midline without atrophy or fasciculations.  Normal VOR, normal supine testing.    Motor  Normal muscle bulk throughout. No fasciculations present. Normal muscle tone.                                               Right                     Left   Shoulder abduction               5                          5  Elbow flexion                         5                          5  Elbow extension                    5                          5  Finger flexion                         5                          5  Finger extension                    5                          5  Finger abduction                    5                          5  Hip flexion                              5                          5  Knee flexion                           5                          5  Plantarflexion                         5                          5  Dorsiflexion                            5                          5  Moderate fast kinetic > postural > rest tremor of both hands (grade 3 with action)  Normal RRM.    Sensory  Reduced pinprick below ankles. Moderately diminished vibration below ankles.     Reflexes                                            Right                      Left  Biceps                                 1+                         1+  Triceps                                1+                         1+  Patellar                                0                         0  Achilles                                0                         0  Right Plantar: downgoing  Left Plantar: downgoing    Right pathological reflexes: Carson's absent.  Left pathological reflexes: Carson's absent.    Coordination  Right: Finger-to-nose normal.Left: Finger-to-nose normal.    Gait   Romberg is present.  Slightly  wide, slightly reduced bilateral armswing, nearly able to tandem.                          Hemoglobin A1C   Date Value Ref Range Status   09/23/2024 8.8 (H) See comment % Final     Estimated Average Glucose   Date Value Ref Range Status   09/23/2024 206 Not Established mg/dL Final     TSH   Date Value Ref Range Status   08/27/2021 3.67 0.44 - 3.98 mIU/L Final     Comment:      TSH testing is performed using different testing    methodology at [facility] than at other    [facility] hospitals. Direct result comparisons should    only be made within the same method.       Folate   Date Value Ref Range Status   11/25/2020 11.1 >5.0 ng/mL Final     Comment:     Low           <3.4  Borderline 3.4-5.0  Normal        >5.0  .   Patients receiving more than 5 mg/day of biotin may have interference   in test results. A sample should be taken no sooner than eight hours   after previous dose. Contact the testing laboratory for additional   information.              Assessment/Plan   Problem List Items Addressed This Visit    None  Visit Diagnoses         Codes    Dizziness    -  Primary R42    Relevant Orders    Autonomic Testing    Essential tremor     G25.0    Relevant Medications    primidone (Mysoline) 50 mg tablet    Other Relevant Orders    TSH with reflex to Free T4 if abnormal    Vitamin B12            Haile Mcfarland is a 74 y.o. year old male with poorly controlled DM2, HTN, CAD, HLD, right CEA, diabetic retinopathy (blind right eye), COPD, here for evaluation of intermittent dizziness which I suspect to be related to orthostatic hypotension and possibly diabetic autonomic neuropathy. He also has poor water intake. Additionally with moderate essential tremor.    We discussed the following plan today:   Autonomic testing  Drink 7 oz glasses of water a day  Diet, exercise, and tighten your diabetes medicine regimen - goal A1c <6%  Start primidone for tremor. 25 mg (1/2 tab) at bedtime for a week, then 50 mg (1  tab) at bedtime for a week, then 75 mg (1.5 tabs) at bedtime for a week, then 100 mg (2 tabs) at bedtime for a week, then 125 mg (2.5 tabs) at bedtime for a week, then 150 mg (3 tabs) at bedtime for a week, then 175 mg (3.5 tabs) at bedtime for a week, then 200 mg (4 tabs) at bedtime until next visit.  Possible side effect is drowsiness or gait impairment. We will check your blood count and liver function every year while you are on this medicine.   Return in 3 months

## 2024-10-24 NOTE — PATIENT INSTRUCTIONS
Thank you for your visit today. You were seen by Dr. Sherwood for dizziness - it is possibly related to diabetic neuropathy and/or dehydration. You also have essential tremor. If you have any questions or need to reach me, call my office at [phone].    We discussed the following plan today:   Autonomic testing  Drink 7 oz glasses of water a day  Diet, exercise, and tighten your diabetes medicine regimen - goal A1c <6%  Start primidone for tremor. 25 mg (1/2 tab) at bedtime for a week, then 50 mg (1 tab) at bedtime for a week, then 75 mg (1.5 tabs) at bedtime for a week, then 100 mg (2 tabs) at bedtime for a week, then 125 mg (2.5 tabs) at bedtime for a week, then 150 mg (3 tabs) at bedtime for a week, then 175 mg (3.5 tabs) at bedtime for a week, then 200 mg (4 tabs) at bedtime until next visit.  Possible side effect is drowsiness or gait impairment. We will check your blood count and liver function every year while you are on this medicine.   Return in 3 months

## 2024-10-29 LAB
ATRIAL RATE: 61 BPM
P AXIS: 47 DEGREES
P OFFSET: 154 MS
P ONSET: 122 MS
PR INTERVAL: 188 MS
Q ONSET: 216 MS
QRS COUNT: 10 BEATS
QRS DURATION: 130 MS
QT INTERVAL: 416 MS
QTC CALCULATION(BAZETT): 418 MS
QTC FREDERICIA: 418 MS
R AXIS: -23 DEGREES
T AXIS: 48 DEGREES
T OFFSET: 424 MS
VENTRICULAR RATE: 61 BPM

## 2024-10-30 ENCOUNTER — PATIENT OUTREACH (OUTPATIENT)
Dept: CARE COORDINATION | Facility: CLINIC | Age: 74
End: 2024-10-30
Payer: MEDICARE

## 2024-11-02 DIAGNOSIS — E11.51 TYPE 2 DIABETES MELLITUS WITH DIABETIC PERIPHERAL ANGIOPATHY WITHOUT GANGRENE, WITH LONG-TERM CURRENT USE OF INSULIN (MULTI): ICD-10-CM

## 2024-11-02 DIAGNOSIS — I10 ESSENTIAL HYPERTENSION, BENIGN: ICD-10-CM

## 2024-11-02 DIAGNOSIS — Z79.4 TYPE 2 DIABETES MELLITUS WITH DIABETIC PERIPHERAL ANGIOPATHY WITHOUT GANGRENE, WITH LONG-TERM CURRENT USE OF INSULIN (MULTI): ICD-10-CM

## 2024-11-02 RX ORDER — CLOPIDOGREL BISULFATE 75 MG/1
75 TABLET ORAL DAILY
Qty: 90 TABLET | Refills: 0 | Status: SHIPPED | OUTPATIENT
Start: 2024-11-02

## 2024-11-04 RX ORDER — AMLODIPINE BESYLATE 10 MG/1
10 TABLET ORAL EVERY EVENING
Qty: 90 TABLET | Refills: 3 | Status: SHIPPED | OUTPATIENT
Start: 2024-11-04 | End: 2025-11-04

## 2024-11-04 NOTE — TELEPHONE ENCOUNTER
Received request for prescription refill for patient.  Patient follows with Dr. Mario Tatum MD     Request is for amlodipine   Is patient currently on medication- yes    Last OV- 9/11/24  Next OV- 9/10/25    Pended for signing and sent to provider.

## 2024-11-08 ENCOUNTER — APPOINTMENT (OUTPATIENT)
Dept: OPHTHALMOLOGY | Facility: CLINIC | Age: 74
End: 2024-11-08
Payer: MEDICARE

## 2024-11-08 DIAGNOSIS — H42 NEOVASCULAR GLAUCOMA DUE TO TYPE 2 DIABETES MELLITUS (MULTI): ICD-10-CM

## 2024-11-08 DIAGNOSIS — H34.8120 CENTRAL RETINAL VEIN OCCLUSION WITH MACULAR EDEMA OF LEFT EYE: Primary | ICD-10-CM

## 2024-11-08 DIAGNOSIS — E11.39 NEOVASCULAR GLAUCOMA DUE TO TYPE 2 DIABETES MELLITUS (MULTI): ICD-10-CM

## 2024-11-08 DIAGNOSIS — Z96.1 PSEUDOPHAKIA: ICD-10-CM

## 2024-11-08 PROCEDURE — 99213 OFFICE O/P EST LOW 20 MIN: CPT | Performed by: OPHTHALMOLOGY

## 2024-11-08 ASSESSMENT — ENCOUNTER SYMPTOMS
MUSCULOSKELETAL NEGATIVE: 0
ENDOCRINE NEGATIVE: 0
PSYCHIATRIC NEGATIVE: 0
HEMATOLOGIC/LYMPHATIC NEGATIVE: 0
RESPIRATORY NEGATIVE: 0
EYES NEGATIVE: 1
CONSTITUTIONAL NEGATIVE: 0
GASTROINTESTINAL NEGATIVE: 0
ALLERGIC/IMMUNOLOGIC NEGATIVE: 0
NEUROLOGICAL NEGATIVE: 0
CARDIOVASCULAR NEGATIVE: 0

## 2024-11-08 ASSESSMENT — PACHYMETRY
OS_CT(UM): 656
OD_CT(UM): 700

## 2024-11-08 ASSESSMENT — REFRACTION_WEARINGRX
OD_AXIS: 170
OD_ADD: +2.50
OD_CYLINDER: -0.75
OS_AXIS: 065
OS_CYLINDER: -1.50
OD_SPHERE: +1.00
OS_ADD: +2.50
OS_SPHERE: +1.00

## 2024-11-08 ASSESSMENT — CONF VISUAL FIELD
OS_SUPERIOR_TEMPORAL_RESTRICTION: 0
OD_SUPERIOR_TEMPORAL_RESTRICTION: 1
OD_INFERIOR_TEMPORAL_RESTRICTION: 1
OD_INFERIOR_NASAL_RESTRICTION: 1
OS_SUPERIOR_NASAL_RESTRICTION: 0
OS_NORMAL: 1
METHOD: COUNTING FINGERS
OS_INFERIOR_NASAL_RESTRICTION: 0
OS_INFERIOR_TEMPORAL_RESTRICTION: 0
OD_SUPERIOR_NASAL_RESTRICTION: 1

## 2024-11-08 ASSESSMENT — SLIT LAMP EXAM - LIDS
COMMENTS: NORMAL
COMMENTS: NORMAL

## 2024-11-08 ASSESSMENT — EXTERNAL EXAM - LEFT EYE: OS_EXAM: NORMAL

## 2024-11-08 ASSESSMENT — CUP TO DISC RATIO
OS_RATIO: 0.3
OD_RATIO: 0.5

## 2024-11-08 ASSESSMENT — TONOMETRY
IOP_METHOD: GOLDMANN APPLANATION
OD_IOP_MMHG: 10
OS_IOP_MMHG: 16

## 2024-11-08 ASSESSMENT — VISUAL ACUITY
CORRECTION_TYPE: GLASSES
OS_CC: 20/40
METHOD: SNELLEN - LINEAR
OD_CC: CF @ 1FT

## 2024-11-08 ASSESSMENT — EXTERNAL EXAM - RIGHT EYE: OD_EXAM: NORMAL

## 2024-11-08 NOTE — PROGRESS NOTES
Visual Acuity (Snellen - Linear)         Right Left    Dist cc CF @ 1ft 20/40      Correction: Glasses          Tonometry       Tonometry (Goldmann Applanation, 8:19 AM)         Right Left    Pressure 10 16                  Assessment/Plan   Last dilated:  6/28/24    1.  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  IOP had been controlled, but recently had been elevating.  On gonio, there is no active NVA, but the angle is 90% scarred with PAS.  Given that IOP is only 30 mmHg on no meds, there is probably acqueous hyposecretion.  Will try medications first.  Pt with h/o quad bypass, so will avoid beta-blocker       On simbrinza and lumigan OD -> IOP much better.  IOP remains well controlled and no need for surgery     Plan:  cont simbrinza OD BID               cont lumigan OD QHS               f/u 7 months ([facility], dilation, RNFL)     2.  Pseudophakia (PCIOL) OU:  minimal PCO OD, s/p YAG Cap OS 5/15/23 with dramatic improvement of VA      Plan:  monitor    3.  Proliferative Diabetic Retinopathy OU:  s/p PRP OU.  +CSME OS       Plan:  cont prednisolone and ketorolac OS QID                 As per Dr. Chapa

## 2024-11-14 ENCOUNTER — PATIENT OUTREACH (OUTPATIENT)
Dept: PRIMARY CARE | Facility: CLINIC | Age: 74
End: 2024-11-14
Payer: MEDICARE

## 2024-11-16 DIAGNOSIS — I10 ESSENTIAL HYPERTENSION, BENIGN: ICD-10-CM

## 2024-11-18 RX ORDER — HYDROCHLOROTHIAZIDE 25 MG/1
25 TABLET ORAL
Qty: 90 TABLET | Refills: 3 | Status: SHIPPED | OUTPATIENT
Start: 2024-11-18

## 2024-11-18 NOTE — TELEPHONE ENCOUNTER
Received request for prescription refills for patient.   Patient follows with Dr. Mario Tatum     Request is for HCTZ  Is patient currently on medication yes    Last OV 9/11/24  Next OV 9/10/25    Pended for signing and sent to provider

## 2024-11-20 ENCOUNTER — APPOINTMENT (OUTPATIENT)
Dept: CARDIOLOGY | Facility: HOSPITAL | Age: 74
DRG: 871 | End: 2024-11-20
Payer: MEDICARE

## 2024-11-20 ENCOUNTER — HOSPITAL ENCOUNTER (INPATIENT)
Facility: HOSPITAL | Age: 74
LOS: 4 days | Discharge: HOME | End: 2024-11-24
Attending: STUDENT IN AN ORGANIZED HEALTH CARE EDUCATION/TRAINING PROGRAM | Admitting: HOSPITALIST
Payer: MEDICARE

## 2024-11-20 ENCOUNTER — APPOINTMENT (OUTPATIENT)
Dept: RADIOLOGY | Facility: HOSPITAL | Age: 74
DRG: 871 | End: 2024-11-20
Payer: MEDICARE

## 2024-11-20 DIAGNOSIS — J44.9 CHRONIC OBSTRUCTIVE PULMONARY DISEASE, UNSPECIFIED COPD TYPE (MULTI): ICD-10-CM

## 2024-11-20 DIAGNOSIS — J18.9 COMMUNITY ACQUIRED PNEUMONIA OF RIGHT MIDDLE LOBE OF LUNG: Primary | ICD-10-CM

## 2024-11-20 PROBLEM — J96.01 ACUTE RESPIRATORY FAILURE WITH HYPOXEMIA (MULTI): Status: ACTIVE | Noted: 2024-11-20

## 2024-11-20 LAB
ALBUMIN SERPL BCP-MCNC: 4.4 G/DL (ref 3.4–5)
ALP SERPL-CCNC: 117 U/L (ref 33–136)
ALT SERPL W P-5'-P-CCNC: 19 U/L (ref 10–52)
ANION GAP SERPL CALC-SCNC: 12 MMOL/L (ref 10–20)
AST SERPL W P-5'-P-CCNC: 15 U/L (ref 9–39)
ATRIAL RATE: 86 BPM
ATRIAL RATE: 93 BPM
BASOPHILS # BLD AUTO: 0.08 X10*3/UL (ref 0–0.1)
BASOPHILS NFR BLD AUTO: 0.4 %
BILIRUB SERPL-MCNC: 0.9 MG/DL (ref 0–1.2)
BNP SERPL-MCNC: 67 PG/ML (ref 0–99)
BUN SERPL-MCNC: 37 MG/DL (ref 6–23)
CALCIUM SERPL-MCNC: 9.2 MG/DL (ref 8.6–10.3)
CARDIAC TROPONIN I PNL SERPL HS: 10 NG/L (ref 0–20)
CARDIAC TROPONIN I PNL SERPL HS: 11 NG/L (ref 0–20)
CHLORIDE SERPL-SCNC: 106 MMOL/L (ref 98–107)
CO2 SERPL-SCNC: 28 MMOL/L (ref 21–32)
CREAT SERPL-MCNC: 1.2 MG/DL (ref 0.5–1.3)
EGFRCR SERPLBLD CKD-EPI 2021: 63 ML/MIN/1.73M*2
EOSINOPHIL # BLD AUTO: 0.31 X10*3/UL (ref 0–0.4)
EOSINOPHIL NFR BLD AUTO: 1.4 %
ERYTHROCYTE [DISTWIDTH] IN BLOOD BY AUTOMATED COUNT: 15 % (ref 11.5–14.5)
FLUAV RNA RESP QL NAA+PROBE: NOT DETECTED
FLUBV RNA RESP QL NAA+PROBE: NOT DETECTED
GLUCOSE BLD MANUAL STRIP-MCNC: 223 MG/DL (ref 74–99)
GLUCOSE BLD MANUAL STRIP-MCNC: 322 MG/DL (ref 74–99)
GLUCOSE BLD MANUAL STRIP-MCNC: 356 MG/DL (ref 74–99)
GLUCOSE SERPL-MCNC: 73 MG/DL (ref 74–99)
HCT VFR BLD AUTO: 39.7 % (ref 41–52)
HGB BLD-MCNC: 13.2 G/DL (ref 13.5–17.5)
HOLD SPECIMEN: NORMAL
IMM GRANULOCYTES # BLD AUTO: 0.12 X10*3/UL (ref 0–0.5)
IMM GRANULOCYTES NFR BLD AUTO: 0.5 % (ref 0–0.9)
INR PPP: 1.1 (ref 0.9–1.1)
LACTATE SERPL-SCNC: 0.9 MMOL/L (ref 0.4–2)
LYMPHOCYTES # BLD AUTO: 0.79 X10*3/UL (ref 0.8–3)
LYMPHOCYTES NFR BLD AUTO: 3.5 %
MAGNESIUM SERPL-MCNC: 1.72 MG/DL (ref 1.6–2.4)
MCH RBC QN AUTO: 29.5 PG (ref 26–34)
MCHC RBC AUTO-ENTMCNC: 33.2 G/DL (ref 32–36)
MCV RBC AUTO: 89 FL (ref 80–100)
MONOCYTES # BLD AUTO: 2.05 X10*3/UL (ref 0.05–0.8)
MONOCYTES NFR BLD AUTO: 9 %
NEUTROPHILS # BLD AUTO: 19.45 X10*3/UL (ref 1.6–5.5)
NEUTROPHILS NFR BLD AUTO: 85.2 %
NRBC BLD-RTO: 0 /100 WBCS (ref 0–0)
P AXIS: 49 DEGREES
P AXIS: 50 DEGREES
P OFFSET: 160 MS
P OFFSET: 163 MS
P ONSET: 128 MS
P ONSET: 132 MS
PLATELET # BLD AUTO: 330 X10*3/UL (ref 150–450)
POTASSIUM SERPL-SCNC: 3.7 MMOL/L (ref 3.5–5.3)
PR INTERVAL: 178 MS
PR INTERVAL: 184 MS
PROT SERPL-MCNC: 7.3 G/DL (ref 6.4–8.2)
PROTHROMBIN TIME: 11.9 SECONDS (ref 9.8–12.8)
Q ONSET: 220 MS
Q ONSET: 221 MS
QRS COUNT: 14 BEATS
QRS COUNT: 16 BEATS
QRS DURATION: 126 MS
QRS DURATION: 128 MS
QT INTERVAL: 350 MS
QT INTERVAL: 356 MS
QTC CALCULATION(BAZETT): 426 MS
QTC CALCULATION(BAZETT): 435 MS
QTC FREDERICIA: 401 MS
QTC FREDERICIA: 405 MS
R AXIS: -32 DEGREES
R AXIS: -34 DEGREES
RBC # BLD AUTO: 4.47 X10*6/UL (ref 4.5–5.9)
SARS-COV-2 RNA RESP QL NAA+PROBE: NOT DETECTED
SODIUM SERPL-SCNC: 142 MMOL/L (ref 136–145)
T AXIS: 84 DEGREES
T AXIS: 88 DEGREES
T OFFSET: 396 MS
T OFFSET: 398 MS
VENTRICULAR RATE: 86 BPM
VENTRICULAR RATE: 93 BPM
WBC # BLD AUTO: 22.8 X10*3/UL (ref 4.4–11.3)

## 2024-11-20 PROCEDURE — 96367 TX/PROPH/DG ADDL SEQ IV INF: CPT

## 2024-11-20 PROCEDURE — 36415 COLL VENOUS BLD VENIPUNCTURE: CPT | Performed by: STUDENT IN AN ORGANIZED HEALTH CARE EDUCATION/TRAINING PROGRAM

## 2024-11-20 PROCEDURE — 87040 BLOOD CULTURE FOR BACTERIA: CPT | Mod: ELYLAB | Performed by: PHYSICIAN ASSISTANT

## 2024-11-20 PROCEDURE — 96365 THER/PROPH/DIAG IV INF INIT: CPT

## 2024-11-20 PROCEDURE — 80053 COMPREHEN METABOLIC PANEL: CPT | Performed by: PHYSICIAN ASSISTANT

## 2024-11-20 PROCEDURE — 84484 ASSAY OF TROPONIN QUANT: CPT | Performed by: PHYSICIAN ASSISTANT

## 2024-11-20 PROCEDURE — 2500000001 HC RX 250 WO HCPCS SELF ADMINISTERED DRUGS (ALT 637 FOR MEDICARE OP): Performed by: HOSPITALIST

## 2024-11-20 PROCEDURE — 2500000002 HC RX 250 W HCPCS SELF ADMINISTERED DRUGS (ALT 637 FOR MEDICARE OP, ALT 636 FOR OP/ED): Performed by: HOSPITALIST

## 2024-11-20 PROCEDURE — 2500000005 HC RX 250 GENERAL PHARMACY W/O HCPCS: Performed by: HOSPITALIST

## 2024-11-20 PROCEDURE — 2550000001 HC RX 255 CONTRASTS: Performed by: STUDENT IN AN ORGANIZED HEALTH CARE EDUCATION/TRAINING PROGRAM

## 2024-11-20 PROCEDURE — 71045 X-RAY EXAM CHEST 1 VIEW: CPT | Mod: FOREIGN READ | Performed by: RADIOLOGY

## 2024-11-20 PROCEDURE — 96375 TX/PRO/DX INJ NEW DRUG ADDON: CPT

## 2024-11-20 PROCEDURE — 93005 ELECTROCARDIOGRAM TRACING: CPT

## 2024-11-20 PROCEDURE — 99223 1ST HOSP IP/OBS HIGH 75: CPT | Performed by: HOSPITALIST

## 2024-11-20 PROCEDURE — 87636 SARSCOV2 & INF A&B AMP PRB: CPT | Performed by: PHYSICIAN ASSISTANT

## 2024-11-20 PROCEDURE — 99285 EMERGENCY DEPT VISIT HI MDM: CPT | Mod: 25

## 2024-11-20 PROCEDURE — 2500000005 HC RX 250 GENERAL PHARMACY W/O HCPCS: Performed by: PHYSICIAN ASSISTANT

## 2024-11-20 PROCEDURE — 71275 CT ANGIOGRAPHY CHEST: CPT

## 2024-11-20 PROCEDURE — 83605 ASSAY OF LACTIC ACID: CPT | Performed by: STUDENT IN AN ORGANIZED HEALTH CARE EDUCATION/TRAINING PROGRAM

## 2024-11-20 PROCEDURE — 2500000002 HC RX 250 W HCPCS SELF ADMINISTERED DRUGS (ALT 637 FOR MEDICARE OP, ALT 636 FOR OP/ED): Performed by: STUDENT IN AN ORGANIZED HEALTH CARE EDUCATION/TRAINING PROGRAM

## 2024-11-20 PROCEDURE — 2500000004 HC RX 250 GENERAL PHARMACY W/ HCPCS (ALT 636 FOR OP/ED): Performed by: PHYSICIAN ASSISTANT

## 2024-11-20 PROCEDURE — 94640 AIRWAY INHALATION TREATMENT: CPT

## 2024-11-20 PROCEDURE — 83880 ASSAY OF NATRIURETIC PEPTIDE: CPT | Performed by: PHYSICIAN ASSISTANT

## 2024-11-20 PROCEDURE — 2500000004 HC RX 250 GENERAL PHARMACY W/ HCPCS (ALT 636 FOR OP/ED): Performed by: HOSPITALIST

## 2024-11-20 PROCEDURE — 71045 X-RAY EXAM CHEST 1 VIEW: CPT

## 2024-11-20 PROCEDURE — 83735 ASSAY OF MAGNESIUM: CPT | Performed by: PHYSICIAN ASSISTANT

## 2024-11-20 PROCEDURE — 36415 COLL VENOUS BLD VENIPUNCTURE: CPT | Performed by: PHYSICIAN ASSISTANT

## 2024-11-20 PROCEDURE — 1200000002 HC GENERAL ROOM WITH TELEMETRY DAILY

## 2024-11-20 PROCEDURE — 82947 ASSAY GLUCOSE BLOOD QUANT: CPT

## 2024-11-20 PROCEDURE — 85025 COMPLETE CBC W/AUTO DIFF WBC: CPT | Performed by: PHYSICIAN ASSISTANT

## 2024-11-20 PROCEDURE — 85610 PROTHROMBIN TIME: CPT | Performed by: PHYSICIAN ASSISTANT

## 2024-11-20 RX ORDER — ATORVASTATIN CALCIUM 80 MG/1
80 TABLET, FILM COATED ORAL NIGHTLY
Status: DISCONTINUED | OUTPATIENT
Start: 2024-11-20 | End: 2024-11-24 | Stop reason: HOSPADM

## 2024-11-20 RX ORDER — PREDNISOLONE ACETATE 10 MG/ML
1 SUSPENSION/ DROPS OPHTHALMIC 2 TIMES DAILY
Status: DISCONTINUED | OUTPATIENT
Start: 2024-11-20 | End: 2024-11-24 | Stop reason: HOSPADM

## 2024-11-20 RX ORDER — DEXTROSE 50 % IN WATER (D50W) INTRAVENOUS SYRINGE
12.5
Status: DISCONTINUED | OUTPATIENT
Start: 2024-11-20 | End: 2024-11-24 | Stop reason: HOSPADM

## 2024-11-20 RX ORDER — KETOROLAC TROMETHAMINE 5 MG/ML
1 SOLUTION OPHTHALMIC NIGHTLY
Status: DISCONTINUED | OUTPATIENT
Start: 2024-11-20 | End: 2024-11-20

## 2024-11-20 RX ORDER — INSULIN LISPRO 100 [IU]/ML
0-10 INJECTION, SOLUTION INTRAVENOUS; SUBCUTANEOUS
Status: DISCONTINUED | OUTPATIENT
Start: 2024-11-20 | End: 2024-11-21

## 2024-11-20 RX ORDER — LATANOPROST 50 UG/ML
1 SOLUTION/ DROPS OPHTHALMIC NIGHTLY
Status: DISCONTINUED | OUTPATIENT
Start: 2024-11-20 | End: 2024-11-24 | Stop reason: HOSPADM

## 2024-11-20 RX ORDER — KETOROLAC TROMETHAMINE 5 MG/ML
1 SOLUTION OPHTHALMIC NIGHTLY
Status: DISCONTINUED | OUTPATIENT
Start: 2024-11-20 | End: 2024-11-24 | Stop reason: HOSPADM

## 2024-11-20 RX ORDER — MAGNESIUM SULFATE HEPTAHYDRATE 40 MG/ML
2 INJECTION, SOLUTION INTRAVENOUS ONCE
Status: COMPLETED | OUTPATIENT
Start: 2024-11-20 | End: 2024-11-20

## 2024-11-20 RX ORDER — LOSARTAN POTASSIUM 50 MG/1
50 TABLET ORAL DAILY
Status: DISCONTINUED | OUTPATIENT
Start: 2024-11-20 | End: 2024-11-24 | Stop reason: HOSPADM

## 2024-11-20 RX ORDER — HYDROCHLOROTHIAZIDE 25 MG/1
25 TABLET ORAL
Status: DISCONTINUED | OUTPATIENT
Start: 2024-11-21 | End: 2024-11-24 | Stop reason: HOSPADM

## 2024-11-20 RX ORDER — ACETAMINOPHEN 325 MG/1
650 TABLET ORAL EVERY 4 HOURS PRN
Status: DISCONTINUED | OUTPATIENT
Start: 2024-11-20 | End: 2024-11-24 | Stop reason: HOSPADM

## 2024-11-20 RX ORDER — CLOPIDOGREL BISULFATE 75 MG/1
75 TABLET ORAL DAILY
Status: DISCONTINUED | OUTPATIENT
Start: 2024-11-20 | End: 2024-11-24 | Stop reason: HOSPADM

## 2024-11-20 RX ORDER — CEFTRIAXONE 1 G/50ML
1 INJECTION, SOLUTION INTRAVENOUS EVERY 24 HOURS
Status: DISCONTINUED | OUTPATIENT
Start: 2024-11-21 | End: 2024-11-24 | Stop reason: HOSPADM

## 2024-11-20 RX ORDER — CARVEDILOL 12.5 MG/1
12.5 TABLET ORAL
Status: DISCONTINUED | OUTPATIENT
Start: 2024-11-20 | End: 2024-11-24 | Stop reason: HOSPADM

## 2024-11-20 RX ORDER — ASPIRIN 81 MG/1
81 TABLET ORAL DAILY
Status: DISCONTINUED | OUTPATIENT
Start: 2024-11-20 | End: 2024-11-24 | Stop reason: HOSPADM

## 2024-11-20 RX ORDER — NITROGLYCERIN 0.4 MG/1
0.4 TABLET SUBLINGUAL EVERY 5 MIN PRN
Status: DISCONTINUED | OUTPATIENT
Start: 2024-11-20 | End: 2024-11-24 | Stop reason: HOSPADM

## 2024-11-20 RX ORDER — ENOXAPARIN SODIUM 100 MG/ML
40 INJECTION SUBCUTANEOUS DAILY
Status: DISCONTINUED | OUTPATIENT
Start: 2024-11-20 | End: 2024-11-24 | Stop reason: HOSPADM

## 2024-11-20 RX ORDER — KETOROLAC TROMETHAMINE 4 MG/ML
1 SOLUTION/ DROPS OPHTHALMIC NIGHTLY
COMMUNITY

## 2024-11-20 RX ORDER — AMLODIPINE BESYLATE 5 MG/1
10 TABLET ORAL EVERY EVENING
Status: DISCONTINUED | OUTPATIENT
Start: 2024-11-20 | End: 2024-11-24 | Stop reason: HOSPADM

## 2024-11-20 RX ORDER — ACETAMINOPHEN 650 MG/1
650 SUPPOSITORY RECTAL EVERY 4 HOURS PRN
Status: DISCONTINUED | OUTPATIENT
Start: 2024-11-20 | End: 2024-11-24 | Stop reason: HOSPADM

## 2024-11-20 RX ORDER — LANOLIN ALCOHOL/MO/W.PET/CERES
400 CREAM (GRAM) TOPICAL DAILY
Status: DISCONTINUED | OUTPATIENT
Start: 2024-11-20 | End: 2024-11-24 | Stop reason: HOSPADM

## 2024-11-20 RX ORDER — ALBUTEROL SULFATE 0.83 MG/ML
2.5 SOLUTION RESPIRATORY (INHALATION) 4 TIMES DAILY
Status: DISCONTINUED | OUTPATIENT
Start: 2024-11-20 | End: 2024-11-23

## 2024-11-20 RX ORDER — AZITHROMYCIN 250 MG/1
500 TABLET, FILM COATED ORAL
Status: DISCONTINUED | OUTPATIENT
Start: 2024-11-21 | End: 2024-11-24 | Stop reason: HOSPADM

## 2024-11-20 RX ORDER — DEXTROSE 50 % IN WATER (D50W) INTRAVENOUS SYRINGE
25
Status: DISCONTINUED | OUTPATIENT
Start: 2024-11-20 | End: 2024-11-24 | Stop reason: HOSPADM

## 2024-11-20 RX ORDER — FAMOTIDINE 20 MG/1
20 TABLET, FILM COATED ORAL 2 TIMES DAILY
Status: DISCONTINUED | OUTPATIENT
Start: 2024-11-20 | End: 2024-11-24 | Stop reason: HOSPADM

## 2024-11-20 RX ORDER — POLYETHYLENE GLYCOL 3350 17 G/17G
17 POWDER, FOR SOLUTION ORAL DAILY
Status: DISCONTINUED | OUTPATIENT
Start: 2024-11-20 | End: 2024-11-24 | Stop reason: HOSPADM

## 2024-11-20 RX ORDER — METFORMIN HYDROCHLORIDE 500 MG/1
500 TABLET ORAL
Status: DISCONTINUED | OUTPATIENT
Start: 2024-11-20 | End: 2024-11-24 | Stop reason: HOSPADM

## 2024-11-20 RX ORDER — ACETAMINOPHEN 160 MG/5ML
650 SOLUTION ORAL EVERY 4 HOURS PRN
Status: DISCONTINUED | OUTPATIENT
Start: 2024-11-20 | End: 2024-11-24 | Stop reason: HOSPADM

## 2024-11-20 RX ORDER — GUAIFENESIN 600 MG/1
600 TABLET, EXTENDED RELEASE ORAL 2 TIMES DAILY
Status: DISCONTINUED | OUTPATIENT
Start: 2024-11-20 | End: 2024-11-24 | Stop reason: HOSPADM

## 2024-11-20 RX ORDER — FAMOTIDINE 10 MG/ML
20 INJECTION INTRAVENOUS 2 TIMES DAILY
Status: DISCONTINUED | OUTPATIENT
Start: 2024-11-20 | End: 2024-11-24 | Stop reason: HOSPADM

## 2024-11-20 SDOH — SOCIAL STABILITY: SOCIAL INSECURITY: ABUSE: ADULT

## 2024-11-20 SDOH — SOCIAL STABILITY: SOCIAL INSECURITY: HAVE YOU HAD THOUGHTS OF HARMING ANYONE ELSE?: NO

## 2024-11-20 SDOH — SOCIAL STABILITY: SOCIAL INSECURITY: WERE YOU ABLE TO COMPLETE ALL THE BEHAVIORAL HEALTH SCREENINGS?: YES

## 2024-11-20 SDOH — SOCIAL STABILITY: SOCIAL INSECURITY: WITHIN THE LAST YEAR, HAVE YOU BEEN HUMILIATED OR EMOTIONALLY ABUSED IN OTHER WAYS BY YOUR PARTNER OR EX-PARTNER?: NO

## 2024-11-20 SDOH — SOCIAL STABILITY: SOCIAL INSECURITY: WITHIN THE LAST YEAR, HAVE YOU BEEN AFRAID OF YOUR PARTNER OR EX-PARTNER?: NO

## 2024-11-20 SDOH — ECONOMIC STABILITY: FOOD INSECURITY: WITHIN THE PAST 12 MONTHS, YOU WORRIED THAT YOUR FOOD WOULD RUN OUT BEFORE YOU GOT THE MONEY TO BUY MORE.: NEVER TRUE

## 2024-11-20 SDOH — SOCIAL STABILITY: SOCIAL INSECURITY: ARE YOU OR HAVE YOU BEEN THREATENED OR ABUSED PHYSICALLY, EMOTIONALLY, OR SEXUALLY BY ANYONE?: NO

## 2024-11-20 SDOH — SOCIAL STABILITY: SOCIAL INSECURITY: ARE THERE ANY APPARENT SIGNS OF INJURIES/BEHAVIORS THAT COULD BE RELATED TO ABUSE/NEGLECT?: NO

## 2024-11-20 SDOH — ECONOMIC STABILITY: INCOME INSECURITY: IN THE PAST 12 MONTHS HAS THE ELECTRIC, GAS, OIL, OR WATER COMPANY THREATENED TO SHUT OFF SERVICES IN YOUR HOME?: NO

## 2024-11-20 SDOH — SOCIAL STABILITY: SOCIAL INSECURITY
WITHIN THE LAST YEAR, HAVE YOU BEEN RAPED OR FORCED TO HAVE ANY KIND OF SEXUAL ACTIVITY BY YOUR PARTNER OR EX-PARTNER?: NO

## 2024-11-20 SDOH — SOCIAL STABILITY: SOCIAL INSECURITY: HAVE YOU HAD ANY THOUGHTS OF HARMING ANYONE ELSE?: NO

## 2024-11-20 SDOH — ECONOMIC STABILITY: FOOD INSECURITY: WITHIN THE PAST 12 MONTHS, THE FOOD YOU BOUGHT JUST DIDN'T LAST AND YOU DIDN'T HAVE MONEY TO GET MORE.: NEVER TRUE

## 2024-11-20 SDOH — SOCIAL STABILITY: SOCIAL INSECURITY: HAS ANYONE EVER THREATENED TO HURT YOUR FAMILY OR YOUR PETS?: NO

## 2024-11-20 SDOH — SOCIAL STABILITY: SOCIAL INSECURITY: DOES ANYONE TRY TO KEEP YOU FROM HAVING/CONTACTING OTHER FRIENDS OR DOING THINGS OUTSIDE YOUR HOME?: NO

## 2024-11-20 SDOH — SOCIAL STABILITY: SOCIAL INSECURITY: DO YOU FEEL UNSAFE GOING BACK TO THE PLACE WHERE YOU ARE LIVING?: NO

## 2024-11-20 SDOH — SOCIAL STABILITY: SOCIAL INSECURITY: DO YOU FEEL ANYONE HAS EXPLOITED OR TAKEN ADVANTAGE OF YOU FINANCIALLY OR OF YOUR PERSONAL PROPERTY?: NO

## 2024-11-20 ASSESSMENT — ACTIVITIES OF DAILY LIVING (ADL)
HEARING - RIGHT EAR: FUNCTIONAL
ASSISTIVE_DEVICE: EYEGLASSES;DENTURES PARTIAL
LACK_OF_TRANSPORTATION: NO
JUDGMENT_ADEQUATE_SAFELY_COMPLETE_DAILY_ACTIVITIES: YES
PATIENT'S MEMORY ADEQUATE TO SAFELY COMPLETE DAILY ACTIVITIES?: YES
LACK_OF_TRANSPORTATION: NO
DRESSING YOURSELF: INDEPENDENT
WALKS IN HOME: INDEPENDENT
HEARING - LEFT EAR: FUNCTIONAL
ADEQUATE_TO_COMPLETE_ADL: YES
TOILETING: INDEPENDENT
FEEDING YOURSELF: INDEPENDENT
BATHING: INDEPENDENT
GROOMING: INDEPENDENT

## 2024-11-20 ASSESSMENT — COGNITIVE AND FUNCTIONAL STATUS - GENERAL
PATIENT BASELINE BEDBOUND: NO
DAILY ACTIVITIY SCORE: 24
MOBILITY SCORE: 24
MOBILITY SCORE: 23
MOBILITY SCORE: 24
CLIMB 3 TO 5 STEPS WITH RAILING: A LITTLE

## 2024-11-20 ASSESSMENT — LIFESTYLE VARIABLES
EVER HAD A DRINK FIRST THING IN THE MORNING TO STEADY YOUR NERVES TO GET RID OF A HANGOVER: NO
AUDIT-C TOTAL SCORE: 0
EVER FELT BAD OR GUILTY ABOUT YOUR DRINKING: NO
HOW OFTEN DO YOU HAVE 6 OR MORE DRINKS ON ONE OCCASION: NEVER
TOTAL SCORE: 0
SKIP TO QUESTIONS 9-10: 1
HOW OFTEN DO YOU HAVE A DRINK CONTAINING ALCOHOL: NEVER
HAVE YOU EVER FELT YOU SHOULD CUT DOWN ON YOUR DRINKING: NO
AUDIT-C TOTAL SCORE: 0
HAVE PEOPLE ANNOYED YOU BY CRITICIZING YOUR DRINKING: NO
HOW MANY STANDARD DRINKS CONTAINING ALCOHOL DO YOU HAVE ON A TYPICAL DAY: PATIENT DOES NOT DRINK

## 2024-11-20 ASSESSMENT — PAIN SCALES - GENERAL
PAINLEVEL_OUTOF10: 0 - NO PAIN

## 2024-11-20 ASSESSMENT — PATIENT HEALTH QUESTIONNAIRE - PHQ9
SUM OF ALL RESPONSES TO PHQ9 QUESTIONS 1 & 2: 0
1. LITTLE INTEREST OR PLEASURE IN DOING THINGS: NOT AT ALL
2. FEELING DOWN, DEPRESSED OR HOPELESS: NOT AT ALL

## 2024-11-20 ASSESSMENT — PAIN - FUNCTIONAL ASSESSMENT: PAIN_FUNCTIONAL_ASSESSMENT: 0-10

## 2024-11-20 NOTE — H&P
History Of Present Illness  Haile Mcfarland is a 74 y.o. male presenting with past medical history of morbid obesity, hypertension, diabetes type 2, hyperlipidemia, coronary artery disease, status post angioplasty and multiple stents, chronic COPD not on home oxygen and a history of recurrent pneumonia on average once a year who presented emergency room with a shortness of breath.  With complaint of shortness of breath since yesterday.  He woke up at 4 AM complaining of trouble breathing.  He denies having orthopnea or PND.  He denies having chest pain.  Associated with a productive cough with a streak of blood.  He denies any fever or chills.  No runny nose or sore throat.  He denies having worsening leg edema.  He denies having nausea vomiting.  Denies having abdominal pain.  Denies any diarrhea.  He denies any dysuria, hematuria, or frequency.     Past Medical History  He has a past medical history of Cataract, Diabetes mellitus (Multi), and Glaucoma.    Surgical History  He has a past surgical history that includes Other surgical history (04/24/2019); Other surgical history (10/21/2021); Other surgical history (10/21/2021); Other surgical history (10/21/2021); Other surgical history (10/21/2021); Other surgical history (10/21/2021); Other surgical history (10/21/2021); Other surgical history (10/21/2021); CT angio neck (02/03/2021); Cataract extraction; and Cardiac electrophysiology procedure (Left, 7/9/2024).     Social History  He reports that he has never smoked. He has never used smokeless tobacco. He reports that he does not drink alcohol and does not use drugs.    Family History  Family History   Problem Relation Name Age of Onset    Tremor Father      Tremor Brother          Allergies  Penicillins    Review of Systems     Physical Exam  Constitutional:       Appearance: Normal appearance.   HENT:      Head: Normocephalic and atraumatic.      Nose: Nose normal.      Mouth/Throat:      Mouth: Mucous  membranes are moist.   Eyes:      Extraocular Movements: Extraocular movements intact.      Pupils: Pupils are equal, round, and reactive to light.   Pulmonary:      Effort: Respiratory distress present.      Breath sounds: Wheezing present.   Abdominal:      General: Abdomen is flat. Bowel sounds are normal.      Palpations: Abdomen is soft.   Musculoskeletal:         General: Normal range of motion.      Cervical back: Normal range of motion and neck supple.   Skin:     General: Skin is warm.   Neurological:      General: No focal deficit present.      Mental Status: He is alert and oriented to person, place, and time.   Psychiatric:         Mood and Affect: Mood normal.         Behavior: Behavior normal.          Last Recorded Vitals  BP (!) 131/98 (BP Location: Left arm, Patient Position: Lying)   Pulse 79   Temp 36.7 °C (98.1 °F) (Temporal)   Resp 17   Wt 129 kg (285 lb)   SpO2 98%     Relevant Results             Assessment/Plan   Assessment & Plan  Community acquired pneumonia of right middle lobe of lung    Acute respiratory failure with hypoxemia (Multi)      Haile Mcfarland is a 74 y.o. male presenting with past medical history of morbid obesity, hypertension, diabetes type 2, hyperlipidemia, coronary artery disease, status post angioplasty and multiple stents, chronic COPD not on home oxygen and a history of recurrent pneumonia on average once a year who presented emergency room with a shortness of breath.        Assessment and plan    Acute respiratory failure with hypoxia  Community-acquired pneumonia  COPD exacerbation    Inpatient admission  Admit to [facility] with cardiac telemetry  Cardiac telemetry    CTA chest   Dense right chest infiltrate consistent with pneumonia. Follow-up to  ensure resolution after appropriate treatment recommended.  No evidence of PE.  Bilateral pleural diaphragmatic thickening with calcifications  similar to 08/19/2023.      Oxygen per nasal cannula to keep saturation  more than 90%  Albuterol [place] nebulizer every 6 hours  Solu-Medrol 40 mg every 6 hours IV  Rocephin 1 g daily  Azithromycin 5 mg daily  CBC with differential daily  Mucinex p.o. 600 twice daily    Morbid obese  Lifestyle modification    Hypertension  Resume losartan and Norco  Resume hydrochlorothiazide    Hyperlipidemia  Resume Lipitor daily    CAD  Resume Coreg p.o. twice  Resume aspirin  Resume Lipitor    Diabetes type 2  Accu-Chek ACH  Insulin sliding scale  Lantus 40 twice  Resume metformin    DVT prophy  Lovenox daily     I spent 50 minutes     Elizabet Perez MD

## 2024-11-20 NOTE — PROGRESS NOTES
11/20/24 1224   Discharge Planning   Living Arrangements Spouse/significant other   Support Systems Spouse/significant other   Assistance Needed PTA - lives with spouse in 2 story home, 2 TR. Independent for ambulation and ADLs, uses cane. Shares IADLs with spouse. Drives.   Type of Residence Private residence   Number of Stairs to Enter Residence 2   Number of Stairs Within Residence 25   Do you have animals or pets at home? Yes   Home or Post Acute Services None   Expected Discharge Disposition Home   Does the patient need discharge transport arranged? No     SOB with blood tinged sputum. History of COPD, room air at baseline. HOME no needs for dc.

## 2024-11-20 NOTE — ED PROVIDER NOTES
"HPI   Chief Complaint   Patient presents with    Shortness of Breath     Woke up coughing and \"spitting blood.\"       A 74-year-old male patient comes into the emergency department today with complaints of severe cough, shortness of breath that started this morning.  States he had a little bit of blood-tinged sputum.  Patient states he has history of COPD does not wear oxygen at home but does do roughly 4 albuterol treatments a day.  Denies any fevers, chills, nausea, vomiting.  Otherwise no other complaints at this present time.              Patient History   Past Medical History:   Diagnosis Date    Cataract     Diabetes mellitus (Multi)     Glaucoma      Past Surgical History:   Procedure Laterality Date    CARDIAC ELECTROPHYSIOLOGY PROCEDURE Left 7/9/2024    Procedure: Loop Recorder Explant;  Surgeon: Brain Valiente MD;  Location: [facility];  Service: Electrophysiology;  Laterality: Left;    CATARACT EXTRACTION      CT ANGIO NECK  02/03/2021    CT NECK ANGIO W AND WO IV CONTRAST 2/3/2021 [facility] CLINICAL LEGAC[facility]    OTHER SURGICAL HISTORY  04/24/2019    Cervical laminectomy    OTHER SURGICAL HISTORY  10/21/2021    Knee replacement    OTHER SURGICAL HISTORY  10/21/2021    Percutaneous transluminal coronary angioplasty    OTHER SURGICAL HISTORY  10/21/2021    Kidney surgery    OTHER SURGICAL HISTORY  10/21/2021    Neck surgery    OTHER SURGICAL HISTORY  10/21/2021    Colonoscopy    OTHER SURGICAL HISTORY  10/21/2021    Tonsillectomy    OTHER SURGICAL HISTORY  10/21/2021    PTA carotid     Family History   Problem Relation Name Age of Onset    Tremor Father      Tremor Brother       Social History     Tobacco Use    Smoking status: Never    Smokeless tobacco: Never   Vaping Use    Vaping status: Never Used   Substance Use Topics    Alcohol use: Never    Drug use: Never       Physical Exam   ED Triage Vitals [11/20/24 0558]   Temperature Heart Rate Respirations BP   36.9 °C (98.4 °F) 96 20 146/73      Pulse " Ox Temp src Heart Rate Source Patient Position   95 % -- Monitor Lying      BP Location FiO2 (%)     Left arm --       Physical Exam  Constitutional:       General: He is not in acute distress.     Appearance: Normal appearance. He is ill-appearing.   HENT:      Head: Normocephalic and atraumatic.      Nose: Nose normal.   Eyes:      Extraocular Movements: Extraocular movements intact.      Conjunctiva/sclera: Conjunctivae normal.      Pupils: Pupils are equal, round, and reactive to light.   Cardiovascular:      Rate and Rhythm: Normal rate and regular rhythm.   Pulmonary:      Effort: Pulmonary effort is normal. No respiratory distress.      Breath sounds: No stridor. Decreased breath sounds and wheezing present.   Musculoskeletal:         General: Normal range of motion.      Cervical back: Normal range of motion.   Skin:     General: Skin is warm and dry.   Neurological:      General: No focal deficit present.      Mental Status: He is alert and oriented to person, place, and time. Mental status is at baseline.   Psychiatric:         Mood and Affect: Mood normal.           ED Course & MDM   Diagnoses as of 11/20/24 0858   Community acquired pneumonia of right middle lobe of lung                 No data recorded                                 Medical Decision Making  A 74-year-old male patient comes into the emergency department today with complaints of severe cough, shortness of breath that started this morning.  States he had a little bit of blood-tinged sputum.  Patient states he has history of COPD does not wear oxygen at home but does do roughly 4 albuterol treatments a day.  Denies any fevers, chills, nausea, vomiting.  Otherwise no other complaints at this present time.    EKG, chest x-ray, laboratory studies are ordered to rule out ACS, arrhythmia, electrolyte abnormality, leukocytosis, acute kidney injury, pneumonia, pneumothorax, pleural effusion, pulmonary congestion.  Nebulized DuoNeb as well as IV  Solu-Medrol IV magnesium.  CT PE study to rule out pulmonary emboli.    At 7:27 AM I reviewed patient's chest x-ray it appears patient most likely has a right middle lobe pneumonia although I do not have a official result back.  Patient does have a 22,000 white blood cell count little heart rate 96 and is requiring O2 which patient does not wear at home.  Will start patient on IV antibiotics including IV Rocephin and azithromycin.  Concern for potential sepsis.    Patient BNP negative troponin negative, normal renal function, magnesium within normal limits.    EKG: My EKG interpretation, 86 bpm, normal sinus rhythm, no ST elevations, no STEMI    Chest x-ray is consistent with a right mid and lower lung consolidation pneumonia.    CT study is negative for pulmonary emboli but consistent with pneumonia.  Patient will be admitted to the hospital as patient is requiring oxygen.  Patient agrees with this plan expressed verbal understanding.  Questions were answered.    Historian is the patient    Diagnosis: Community-acquired pneumonia    I discussed case with Dr. Perez who agrees to admit the patient under his service      Labs Reviewed   CBC WITH AUTO DIFFERENTIAL - Abnormal       Result Value    WBC 22.8 (*)     nRBC 0.0      RBC 4.47 (*)     Hemoglobin 13.2 (*)     Hematocrit 39.7 (*)     MCV 89      MCH 29.5      MCHC 33.2      RDW 15.0 (*)     Platelets 330      Neutrophils % 85.2      Immature Granulocytes %, Automated 0.5      Lymphocytes % 3.5      Monocytes % 9.0      Eosinophils % 1.4      Basophils % 0.4      Neutrophils Absolute 19.45 (*)     Immature Granulocytes Absolute, Automated 0.12      Lymphocytes Absolute 0.79 (*)     Monocytes Absolute 2.05 (*)     Eosinophils Absolute 0.31      Basophils Absolute 0.08     COMPREHENSIVE METABOLIC PANEL - Abnormal    Glucose 73 (*)     Sodium 142      Potassium 3.7      Chloride 106      Bicarbonate 28      Anion Gap 12      Urea Nitrogen 37 (*)     Creatinine  1.20      eGFR 63      Calcium 9.2      Albumin 4.4      Alkaline Phosphatase 117      Total Protein 7.3      AST 15      Bilirubin, Total 0.9      ALT 19     MAGNESIUM - Normal    Magnesium 1.72     PROTIME-INR - Normal    Protime 11.9      INR 1.1     B-TYPE NATRIURETIC PEPTIDE - Normal    BNP 67      Narrative:        <100 pg/mL - Heart failure unlikely  100-299 pg/mL - Intermediate probability of acute heart                  failure exacerbation. Correlate with clinical                  context and patient history.    >=300 pg/mL - Heart Failure likely. Correlate with clinical                  context and patient history.    BNP testing is performed using different testing methodology at [facility] than at other [facility]. Direct result comparisons should only be made within the same method.      SERIAL TROPONIN-INITIAL - Normal    Troponin I, High Sensitivity 10      Narrative:     Less than 99th percentile of normal range cutoff-  Female and children under 18 years old <14 ng/L; Male <21 ng/L: Negative  Repeat testing should be performed if clinically indicated.     Female and children under 18 years old 14-50 ng/L; Male 21-50 ng/L:  Consistent with possible cardiac damage and possible increased clinical   risk. Serial measurements may help to assess extent of myocardial damage.     >50 ng/L: Consistent with cardiac damage, increased clinical risk and  myocardial infarction. Serial measurements may help assess extent of   myocardial damage.      NOTE: Children less than 1 year old may have higher baseline troponin   levels and results should be interpreted in conjunction with the overall   clinical context.     NOTE: Troponin I testing is performed using a different   testing methodology at [facility] than at other   [facility]. Direct result comparisons should only   be made within the same method.   SERIAL TROPONIN, 1 HOUR - Normal    Troponin I, High Sensitivity 11       Narrative:     Less than 99th percentile of normal range cutoff-  Female and children under 18 years old <14 ng/L; Male <21 ng/L: Negative  Repeat testing should be performed if clinically indicated.     Female and children under 18 years old 14-50 ng/L; Male 21-50 ng/L:  Consistent with possible cardiac damage and possible increased clinical   risk. Serial measurements may help to assess extent of myocardial damage.     >50 ng/L: Consistent with cardiac damage, increased clinical risk and  myocardial infarction. Serial measurements may help assess extent of   myocardial damage.      NOTE: Children less than 1 year old may have higher baseline troponin   levels and results should be interpreted in conjunction with the overall   clinical context.     NOTE: Troponin I testing is performed using a different   testing methodology at [facility] than at other   [facility]. Direct result comparisons should only   be made within the same method.   LACTATE - Normal    Lactate 0.9      Narrative:     Venipuncture immediately after or during the administration of Metamizole may lead to falsely low results. Testing should be performed immediately prior to Metamizole dosing.   BLOOD CULTURE   BLOOD CULTURE   TROPONIN SERIES- (INITIAL, 1 HR)    Narrative:     The following orders were created for panel order Troponin I Series, High Sensitivity (0, 1 HR).  Procedure                               Abnormality         Status                     ---------                               -----------         ------                     Troponin I, High Sensiti...[[id number]]  Normal              Final result               Troponin, High Sensitivi...[[id number]]  Normal              Final result                 Please view results for these tests on the individual orders.   SARS-COV-2 PCR   INFLUENZA A AND B PCR        CT angio chest for pulmonary embolism   Final Result   Dense right chest infiltrate consistent with  pneumonia. Follow-up to   ensure resolution after appropriate treatment recommended.        No evidence of PE.        Bilateral pleural diaphragmatic thickening with calcifications   similar to 08/19/2023.        Additional findings as described above.        MACRO:   None.        Signed by: Sadie Cochran 11/20/2024 8:20 AM   Dictation workstation:   [id number]      XR chest 1 view   Final Result   Right mid and lower lung consolidation/pneumonia.  Small right pleural   effusion is suggested.   Signed by Thiago Calero MD            Procedure  ECG 12 lead    Performed by: Carlos Heller PA-C  Authorized by: Carlos Heller PA-C    Interpretation:     Details:  My EKG interpretation  Rate:     ECG rate:  93    ECG rate assessment: normal    Rhythm:     Rhythm: sinus rhythm    ST segments:     ST segments:  Normal  T waves:     T waves: normal         Carlos Heller PA-C  11/20/24 0858

## 2024-11-20 NOTE — CARE PLAN
The patient's goals for the shift include rest and comfort    The clinical goals for the shift include Patient will maitain SpO2 above 92%    Over the shift, the patient did not make progress toward the following goals. Barriers to progression include; none. Recommendations to address these barriers include; continue current plan of care.        Problem: Safety - Adult  Goal: Free from fall injury  11/20/2024 1132 by Ana Jennings RN  Outcome: Progressing  Flowsheets (Taken 11/20/2024 1132)  Free from fall injury: Instruct family/caregiver on patient safety  11/20/2024 1131 by Ana Jennings RN  Outcome: Progressing     Problem: Respiratory  Goal: Clear secretions with interventions this shift  Outcome: Progressing  Flowsheets (Taken 11/20/2024 1132)  Clear secretions with interventions this shift:   Encourage/provide pulmonary hygiene/secretion clearance   Med administration/monitoring of effect     Problem: Respiratory  Goal: Wean oxygen to maintain O2 saturation per order/standard this shift  Outcome: Progressing  Flowsheets (Taken 11/20/2024 1132)  Wean oxygen to maintain O2 saturation per order/standard this shift:   Encourage activity/mobility   Incentive spirometry

## 2024-11-21 LAB
ANION GAP SERPL CALC-SCNC: 17 MMOL/L (ref 10–20)
BASOPHILS # BLD AUTO: 0.02 X10*3/UL (ref 0–0.1)
BASOPHILS NFR BLD AUTO: 0.1 %
BUN SERPL-MCNC: 50 MG/DL (ref 6–23)
CALCIUM SERPL-MCNC: 8.4 MG/DL (ref 8.6–10.3)
CHLORIDE SERPL-SCNC: 101 MMOL/L (ref 98–107)
CO2 SERPL-SCNC: 21 MMOL/L (ref 21–32)
CREAT SERPL-MCNC: 1.39 MG/DL (ref 0.5–1.3)
EGFRCR SERPLBLD CKD-EPI 2021: 53 ML/MIN/1.73M*2
EOSINOPHIL # BLD AUTO: 0 X10*3/UL (ref 0–0.4)
EOSINOPHIL NFR BLD AUTO: 0 %
ERYTHROCYTE [DISTWIDTH] IN BLOOD BY AUTOMATED COUNT: 15 % (ref 11.5–14.5)
GLUCOSE BLD MANUAL STRIP-MCNC: 403 MG/DL (ref 74–99)
GLUCOSE BLD MANUAL STRIP-MCNC: 411 MG/DL (ref 74–99)
GLUCOSE BLD MANUAL STRIP-MCNC: 423 MG/DL (ref 74–99)
GLUCOSE BLD MANUAL STRIP-MCNC: 471 MG/DL (ref 74–99)
GLUCOSE SERPL-MCNC: 437 MG/DL (ref 74–99)
HCT VFR BLD AUTO: 35.4 % (ref 41–52)
HGB BLD-MCNC: 11.6 G/DL (ref 13.5–17.5)
HOLD SPECIMEN: NORMAL
IMM GRANULOCYTES # BLD AUTO: 0.14 X10*3/UL (ref 0–0.5)
IMM GRANULOCYTES NFR BLD AUTO: 0.7 % (ref 0–0.9)
LYMPHOCYTES # BLD AUTO: 1 X10*3/UL (ref 0.8–3)
LYMPHOCYTES NFR BLD AUTO: 4.9 %
MAGNESIUM SERPL-MCNC: 2.35 MG/DL (ref 1.6–2.4)
MCH RBC QN AUTO: 29.2 PG (ref 26–34)
MCHC RBC AUTO-ENTMCNC: 32.8 G/DL (ref 32–36)
MCV RBC AUTO: 89 FL (ref 80–100)
MONOCYTES # BLD AUTO: 0.54 X10*3/UL (ref 0.05–0.8)
MONOCYTES NFR BLD AUTO: 2.6 %
NEUTROPHILS # BLD AUTO: 18.88 X10*3/UL (ref 1.6–5.5)
NEUTROPHILS NFR BLD AUTO: 91.7 %
NRBC BLD-RTO: 0 /100 WBCS (ref 0–0)
PLATELET # BLD AUTO: 287 X10*3/UL (ref 150–450)
POTASSIUM SERPL-SCNC: 4 MMOL/L (ref 3.5–5.3)
RBC # BLD AUTO: 3.97 X10*6/UL (ref 4.5–5.9)
SODIUM SERPL-SCNC: 135 MMOL/L (ref 136–145)
WBC # BLD AUTO: 20.6 X10*3/UL (ref 4.4–11.3)

## 2024-11-21 PROCEDURE — 36415 COLL VENOUS BLD VENIPUNCTURE: CPT | Performed by: HOSPITALIST

## 2024-11-21 PROCEDURE — 94640 AIRWAY INHALATION TREATMENT: CPT

## 2024-11-21 PROCEDURE — 82947 ASSAY GLUCOSE BLOOD QUANT: CPT

## 2024-11-21 PROCEDURE — 2500000002 HC RX 250 W HCPCS SELF ADMINISTERED DRUGS (ALT 637 FOR MEDICARE OP, ALT 636 FOR OP/ED): Performed by: HOSPITALIST

## 2024-11-21 PROCEDURE — 2500000001 HC RX 250 WO HCPCS SELF ADMINISTERED DRUGS (ALT 637 FOR MEDICARE OP): Performed by: HOSPITALIST

## 2024-11-21 PROCEDURE — 99232 SBSQ HOSP IP/OBS MODERATE 35: CPT | Performed by: HOSPITALIST

## 2024-11-21 PROCEDURE — 1200000002 HC GENERAL ROOM WITH TELEMETRY DAILY

## 2024-11-21 PROCEDURE — 2500000002 HC RX 250 W HCPCS SELF ADMINISTERED DRUGS (ALT 637 FOR MEDICARE OP, ALT 636 FOR OP/ED): Performed by: STUDENT IN AN ORGANIZED HEALTH CARE EDUCATION/TRAINING PROGRAM

## 2024-11-21 PROCEDURE — 85025 COMPLETE CBC W/AUTO DIFF WBC: CPT | Performed by: HOSPITALIST

## 2024-11-21 PROCEDURE — 80048 BASIC METABOLIC PNL TOTAL CA: CPT | Performed by: HOSPITALIST

## 2024-11-21 PROCEDURE — 2500000004 HC RX 250 GENERAL PHARMACY W/ HCPCS (ALT 636 FOR OP/ED): Performed by: HOSPITALIST

## 2024-11-21 PROCEDURE — 83735 ASSAY OF MAGNESIUM: CPT | Performed by: HOSPITALIST

## 2024-11-21 RX ORDER — INSULIN GLARGINE 100 [IU]/ML
40 INJECTION, SOLUTION SUBCUTANEOUS NIGHTLY
Status: DISCONTINUED | OUTPATIENT
Start: 2024-11-21 | End: 2024-11-22

## 2024-11-21 RX ORDER — INSULIN LISPRO 100 [IU]/ML
15 INJECTION, SOLUTION INTRAVENOUS; SUBCUTANEOUS ONCE
Status: COMPLETED | OUTPATIENT
Start: 2024-11-21 | End: 2024-11-21

## 2024-11-21 RX ORDER — INSULIN LISPRO 100 [IU]/ML
0-20 INJECTION, SOLUTION INTRAVENOUS; SUBCUTANEOUS
Status: DISCONTINUED | OUTPATIENT
Start: 2024-11-21 | End: 2024-11-24 | Stop reason: HOSPADM

## 2024-11-21 RX ORDER — INSULIN LISPRO 100 [IU]/ML
20 INJECTION, SOLUTION INTRAVENOUS; SUBCUTANEOUS ONCE
Status: COMPLETED | OUTPATIENT
Start: 2024-11-21 | End: 2024-11-21

## 2024-11-21 ASSESSMENT — COGNITIVE AND FUNCTIONAL STATUS - GENERAL
CLIMB 3 TO 5 STEPS WITH RAILING: A LOT
MOBILITY SCORE: 22
DAILY ACTIVITIY SCORE: 24

## 2024-11-21 ASSESSMENT — PAIN SCALES - GENERAL
PAINLEVEL_OUTOF10: 0 - NO PAIN
PAINLEVEL_OUTOF10: 0 - NO PAIN

## 2024-11-21 NOTE — PROGRESS NOTES
Rounded with interdisciplinary team. Plan for dc home , lives with spouse. To wean off oxygen, no home oxygen. Reports ambulatory & denies discharge needs.

## 2024-11-21 NOTE — CARE PLAN
The patient's goals for the shift includePt wants decreased shortness of breath.    The clinical goals for the shift include Patient will maitain SpO2 above 92%

## 2024-11-21 NOTE — CARE PLAN
The patient's goals for the shift include rest and comfort    The clinical goals for the shift include patients SpO2 will remain >92% on room air through out shift      Over the shift, the patient did make progress toward the following goals. Barriers to progression include none. Recommendations to address these barriers include continue with current plan of care.

## 2024-11-21 NOTE — PROGRESS NOTES
Haile Mcfarland is a 74 y.o. male on day 1 of admission presenting with Community acquired pneumonia of right middle lobe of lung.      Subjective       Seen and examined   Clinically slightly better   Still has productive with slight blood   No chest pain   Less SOB  No fever         Objective     Last Recorded Vitals  /65 (Patient Position: Sitting)   Pulse 75   Temp 36.5 °C (97.7 °F)   Resp 18   Wt 129 kg (285 lb)   SpO2 96%   Intake/Output last 3 Shifts:    Intake/Output Summary (Last 24 hours) at 11/21/2024 1222  Last data filed at 11/21/2024 1112  Gross per 24 hour   Intake 50 ml   Output 700 ml   Net -650 ml     Constitutional:       Appearance: Normal appearance.   HENT:      Head: Normocephalic and atraumatic.      Nose: Nose normal.      Mouth/Throat:      Mouth: Mucous membranes are moist.   Eyes:      Extraocular Movements: Extraocular movements intact.      Pupils: Pupils are equal, round, and reactive to light.   Pulmonary:      CTA     Abdominal:      General: Abdomen is flat. Bowel sounds are normal.      Palpations: Abdomen is soft.   Musculoskeletal:         General: Normal range of motion.      Cervical back: Normal range of motion and neck supple.   Skin:     General: Skin is warm.   Neurological:      General: No focal deficit present.      Mental Status: He is alert and oriented to person, place, and time.   Psychiatric:         Mood and Affect: Mood normal.         Behavior: Behavior normal.       Admission Weight  Weight: 127 kg (280 lb) (11/20/24 0558)    Daily Weight  11/20/24 : 129 kg (285 lb)    Image Results  ECG 12 lead  Normal sinus rhythm  Left axis deviation  Left ventricular hypertrophy with QRS widening and repolarization abnormality  Abnormal ECG  When compared with ECG of 20-NOV-2024 06:53, (unconfirmed)  No significant change was found  See ED provider note for full interpretation and clinical correlation  Confirmed by Ivana Sweeney ([id number]) on 11/20/2024  11:48:34 AM  ECG 12 lead  Normal sinus rhythm  Left axis deviation  Left ventricular hypertrophy with QRS widening and repolarization abnormality  Abnormal ECG  When compared with ECG of 20-OCT-2024 11:46,  Vent. rate has increased BY  32 BPM  See ED provider note for full interpretation and clinical correlation  Confirmed by Ivana Sweeney ([id number]) on 11/20/2024 11:45:57 AM  CT angio chest for pulmonary embolism  Narrative: Interpreted By:  Sadie Cochran,   STUDY:  CT ANGIO CHEST FOR PULMONARY EMBOLISM;  11/20/2024 7:45 am      INDICATION:  Signs/Symptoms:Shortness of breath, hemoptysis.          COMPARISON:  08/19/2023      ACCESSION NUMBER(S):  [id number]      ORDERING CLINICIAN:  FRANTZ BLACKWOOD      TECHNIQUE:  CT of the chest was performed. Sagittal and coronal reconstructions  were generated. 75 ML Omnipaque 350 intravenous contrast given for  the examination.  Multiplanar reconstructions of the pulmonary  vessels were created on an independent workstation and provided for  review.      FINDINGS:          CHEST WALL AND LOWER NECK: No significant axillary lymphadenopathy.      MEDIASTINUM AND GRETCHEN:  No significant lymphadenopathy.      HEART AND VESSELS:  No pulmonary artery filling defect to suggest PE.  The heart is normal in size. No significant pericardial effusion. No  thoracic aortic aneurysm. Atherosclerotic calcifications including  the coronary arteries. Probable coronary artery stents also noted.      LUNGS, PLEURA, LARGE AIRWAYS:  Dense nodular partially confluent  infiltrate in the right upper lobe. Smaller nodular infiltrates in  the right lower lobe. Background pulmonary heterogeneity and linear  densities in each lung base similar to the prior exam. Uneven  bilateral pleural diaphragmatic thickening with calcifications  similar to the prior exam. The central airways are patent.      UPPER ABDOMEN:  Calcified splenic granulomas. Colonic diverticulosis.      BONES:  Degenerative endplate  spurring in the thoracic spine.      Impression: Dense right chest infiltrate consistent with pneumonia. Follow-up to  ensure resolution after appropriate treatment recommended.      No evidence of PE.      Bilateral pleural diaphragmatic thickening with calcifications  similar to 08/19/2023.      Additional findings as described above.      MACRO:  None.      Signed by: Sadie Cochran 11/20/2024 8:20 AM  Dictation workstation:   [id number]  XR chest 1 view  Narrative: STUDY:  Chest Radiograph; 11/20/2024 6:24 AM  INDICATION:  Shortness of breath.  COMPARISON:  XR chest 08/26/2024, 05/29/2023.  ACCESSION NUMBER(S):  [id number]  ORDERING CLINICIAN:  FRANTZ BLACKWOOD  TECHNIQUE:  Frontal chest was obtained at 06:23:00 hours.  FINDINGS:  CARDIOMEDIASTINAL SILHOUETTE:  Cardiomediastinal silhouette is normal in size and configuration.     LUNGS:  Right mid and lower lung consolidation is demonstrated, most  compatible with pneumonia.  Small right pleural effusion is suggested.   Left lung is grossly clear.  No pneumothorax.     ABDOMEN:  No remarkable upper abdominal findings.     BONES:  No acute osseous changes.  Impression: Right mid and lower lung consolidation/pneumonia.  Small right pleural  effusion is suggested.  Signed by Thiago Calero MD      Physical Exam    Relevant Results               Assessment/Plan                  Assessment & Plan  Community acquired pneumonia of right middle lobe of lung    Acute respiratory failure with hypoxemia (Multi)      Haile Mcfarland is a 74 y.o. male presenting with past medical history of morbid obesity, hypertension, diabetes type 2, hyperlipidemia, coronary artery disease, status post angioplasty and multiple stents, chronic COPD not on home oxygen and a history of recurrent pneumonia on average once a year who presented emergency room with a shortness of breath.          Assessment and plan     Acute respiratory failure with hypoxia  Community-acquired pneumonia  COPD  exacerbation   Seen and examined   Clinically better   Less SOB  No chest pain   No fever or chills      CTA chest   Dense right chest infiltrate consistent with pneumonia. Follow-up to  ensure resolution after appropriate treatment recommended.  No evidence of PE.  Bilateral pleural diaphragmatic thickening with calcifications  similar to 08/19/2023.      Oxygen per nasal cannula to keep saturation more than 90%  Albuterol [place] nebulizer every 6 hours  Solu-Medrol 40 mg every 6 hours IV  Rocephin 1 g daily  Azithromycin 5 mg daily  CBC with differential daily  Mucinex p.o. 600 twice daily     Morbid obese  Lifestyle modification     Hypertension  Resume losartan and Norco  Resume hydrochlorothiazide     Hyperlipidemia  Resume Lipitor daily     CAD  Resume Coreg p.o. twice  Resume aspirin  Resume Lipitor     Diabetes type 2  Accu-Chek ACH  Insulin sliding scale  Lantus 40 twice  Resume metformin     DVT prophy  Lovenox daily    MD Elizabet Elizabeth MD

## 2024-11-22 LAB
ANION GAP SERPL CALC-SCNC: 17 MMOL/L (ref 10–20)
BUN SERPL-MCNC: 55 MG/DL (ref 6–23)
CALCIUM SERPL-MCNC: 7.9 MG/DL (ref 8.6–10.3)
CHLORIDE SERPL-SCNC: 99 MMOL/L (ref 98–107)
CO2 SERPL-SCNC: 21 MMOL/L (ref 21–32)
CREAT SERPL-MCNC: 1.33 MG/DL (ref 0.5–1.3)
EGFRCR SERPLBLD CKD-EPI 2021: 56 ML/MIN/1.73M*2
ERYTHROCYTE [DISTWIDTH] IN BLOOD BY AUTOMATED COUNT: 14.9 % (ref 11.5–14.5)
GLUCOSE BLD MANUAL STRIP-MCNC: 259 MG/DL (ref 74–99)
GLUCOSE BLD MANUAL STRIP-MCNC: 391 MG/DL (ref 74–99)
GLUCOSE BLD MANUAL STRIP-MCNC: 400 MG/DL (ref 74–99)
GLUCOSE BLD MANUAL STRIP-MCNC: 421 MG/DL (ref 74–99)
GLUCOSE BLD MANUAL STRIP-MCNC: 461 MG/DL (ref 74–99)
GLUCOSE BLD MANUAL STRIP-MCNC: 470 MG/DL (ref 74–99)
GLUCOSE SERPL-MCNC: 511 MG/DL (ref 74–99)
HCT VFR BLD AUTO: 32.3 % (ref 41–52)
HGB BLD-MCNC: 11 G/DL (ref 13.5–17.5)
HOLD SPECIMEN: NORMAL
MCH RBC QN AUTO: 29.7 PG (ref 26–34)
MCHC RBC AUTO-ENTMCNC: 34.1 G/DL (ref 32–36)
MCV RBC AUTO: 87 FL (ref 80–100)
NRBC BLD-RTO: 0 /100 WBCS (ref 0–0)
PLATELET # BLD AUTO: 261 X10*3/UL (ref 150–450)
POTASSIUM SERPL-SCNC: 4 MMOL/L (ref 3.5–5.3)
RBC # BLD AUTO: 3.7 X10*6/UL (ref 4.5–5.9)
SODIUM SERPL-SCNC: 133 MMOL/L (ref 136–145)
WBC # BLD AUTO: 18 X10*3/UL (ref 4.4–11.3)

## 2024-11-22 PROCEDURE — 85027 COMPLETE CBC AUTOMATED: CPT | Performed by: HOSPITALIST

## 2024-11-22 PROCEDURE — 36415 COLL VENOUS BLD VENIPUNCTURE: CPT | Performed by: HOSPITALIST

## 2024-11-22 PROCEDURE — 99232 SBSQ HOSP IP/OBS MODERATE 35: CPT | Performed by: HOSPITALIST

## 2024-11-22 PROCEDURE — 2500000002 HC RX 250 W HCPCS SELF ADMINISTERED DRUGS (ALT 637 FOR MEDICARE OP, ALT 636 FOR OP/ED): Performed by: STUDENT IN AN ORGANIZED HEALTH CARE EDUCATION/TRAINING PROGRAM

## 2024-11-22 PROCEDURE — 80048 BASIC METABOLIC PNL TOTAL CA: CPT | Performed by: HOSPITALIST

## 2024-11-22 PROCEDURE — 2500000001 HC RX 250 WO HCPCS SELF ADMINISTERED DRUGS (ALT 637 FOR MEDICARE OP): Performed by: HOSPITALIST

## 2024-11-22 PROCEDURE — 2500000004 HC RX 250 GENERAL PHARMACY W/ HCPCS (ALT 636 FOR OP/ED): Performed by: HOSPITALIST

## 2024-11-22 PROCEDURE — 1200000002 HC GENERAL ROOM WITH TELEMETRY DAILY

## 2024-11-22 PROCEDURE — 2500000002 HC RX 250 W HCPCS SELF ADMINISTERED DRUGS (ALT 637 FOR MEDICARE OP, ALT 636 FOR OP/ED): Performed by: HOSPITALIST

## 2024-11-22 PROCEDURE — 94640 AIRWAY INHALATION TREATMENT: CPT

## 2024-11-22 PROCEDURE — 82947 ASSAY GLUCOSE BLOOD QUANT: CPT

## 2024-11-22 RX ORDER — INSULIN GLARGINE 100 [IU]/ML
50 INJECTION, SOLUTION SUBCUTANEOUS 2 TIMES DAILY
Status: DISCONTINUED | OUTPATIENT
Start: 2024-11-22 | End: 2024-11-24 | Stop reason: HOSPADM

## 2024-11-22 RX ORDER — INSULIN GLARGINE 100 [IU]/ML
20 INJECTION, SOLUTION SUBCUTANEOUS EVERY 24 HOURS
Status: DISCONTINUED | OUTPATIENT
Start: 2024-11-22 | End: 2024-11-22

## 2024-11-22 RX ORDER — ALBUTEROL SULFATE 0.83 MG/ML
2.5 SOLUTION RESPIRATORY (INHALATION) EVERY 4 HOURS PRN
Status: DISCONTINUED | OUTPATIENT
Start: 2024-11-22 | End: 2024-11-24 | Stop reason: HOSPADM

## 2024-11-22 ASSESSMENT — COGNITIVE AND FUNCTIONAL STATUS - GENERAL
DAILY ACTIVITIY SCORE: 24
CLIMB 3 TO 5 STEPS WITH RAILING: A LITTLE
MOBILITY SCORE: 23
CLIMB 3 TO 5 STEPS WITH RAILING: A LITTLE
MOBILITY SCORE: 23
DAILY ACTIVITIY SCORE: 24

## 2024-11-22 ASSESSMENT — PAIN SCALES - GENERAL
PAINLEVEL_OUTOF10: 0 - NO PAIN
PAINLEVEL_OUTOF10: 0 - NO PAIN

## 2024-11-22 ASSESSMENT — PAIN - FUNCTIONAL ASSESSMENT: PAIN_FUNCTIONAL_ASSESSMENT: 0-10

## 2024-11-22 ASSESSMENT — PAIN SCALES - WONG BAKER: WONGBAKER_NUMERICALRESPONSE: NO HURT

## 2024-11-22 NOTE — CARE PLAN
The patient's goals for the shift include rest and comfort    The clinical goals for the shift include patients SpO2 will remain >92% on room air through out shift

## 2024-11-22 NOTE — CARE PLAN
The patient's goals for the shift include no pain    The clinical goals for the shift include Patient sp02 will be >92% on room air through end of shift    Over the shift, the patient did not make progress toward the following goals: none. Barriers to progression include n/a. Recommendations to address these barriers include continue current plan of care.

## 2024-11-22 NOTE — PROGRESS NOTES
Haile Mcfarland is a 74 y.o. male on day 2 of admission presenting with Community acquired pneumonia of right middle lobe of lung.      Subjective       Seen and examined   Clinically slightly better   Still has productive with slight blood   No chest pain   Less SOB  No fever         Objective     Last Recorded Vitals  /67   Pulse 70   Temp 36.7 °C (98.1 °F)   Resp 18   Wt 129 kg (285 lb)   SpO2 91%   Intake/Output last 3 Shifts:    Intake/Output Summary (Last 24 hours) at 11/22/2024 1136  Last data filed at 11/22/2024 0843  Gross per 24 hour   Intake 50 ml   Output 1200 ml   Net -1150 ml     Constitutional:       Appearance: Normal appearance.   HENT:      Head: Normocephalic and atraumatic.      Nose: Nose normal.      Mouth/Throat:      Mouth: Mucous membranes are moist.   Eyes:      Extraocular Movements: Extraocular movements intact.      Pupils: Pupils are equal, round, and reactive to light.   Pulmonary:      CTA     Abdominal:      General: Abdomen is flat. Bowel sounds are normal.      Palpations: Abdomen is soft.   Musculoskeletal:         General: Normal range of motion.      Cervical back: Normal range of motion and neck supple.   Skin:     General: Skin is warm.   Neurological:      General: No focal deficit present.      Mental Status: He is alert and oriented to person, place, and time.   Psychiatric:         Mood and Affect: Mood normal.         Behavior: Behavior normal.       Admission Weight  Weight: 127 kg (280 lb) (11/20/24 0558)    Daily Weight  11/20/24 : 129 kg (285 lb)    Image Results  ECG 12 lead  Normal sinus rhythm  Left axis deviation  Left ventricular hypertrophy with QRS widening and repolarization abnormality  Abnormal ECG  When compared with ECG of 20-NOV-2024 06:53, (unconfirmed)  No significant change was found  See ED provider note for full interpretation and clinical correlation  Confirmed by Ivana Sweeney ([id number]) on 11/20/2024 11:48:34 AM  ECG 12 lead  Normal  sinus rhythm  Left axis deviation  Left ventricular hypertrophy with QRS widening and repolarization abnormality  Abnormal ECG  When compared with ECG of 20-OCT-2024 11:46,  Vent. rate has increased BY  32 BPM  See ED provider note for full interpretation and clinical correlation  Confirmed by Ivana Sweeney ([id number]) on 11/20/2024 11:45:57 AM  CT angio chest for pulmonary embolism  Narrative: Interpreted By:  Sadie Cochran,   STUDY:  CT ANGIO CHEST FOR PULMONARY EMBOLISM;  11/20/2024 7:45 am      INDICATION:  Signs/Symptoms:Shortness of breath, hemoptysis.          COMPARISON:  08/19/2023      ACCESSION NUMBER(S):  [id number]      ORDERING CLINICIAN:  FRANTZ BLACKWOOD      TECHNIQUE:  CT of the chest was performed. Sagittal and coronal reconstructions  were generated. 75 ML Omnipaque 350 intravenous contrast given for  the examination.  Multiplanar reconstructions of the pulmonary  vessels were created on an independent workstation and provided for  review.      FINDINGS:          CHEST WALL AND LOWER NECK: No significant axillary lymphadenopathy.      MEDIASTINUM AND GRETCHEN:  No significant lymphadenopathy.      HEART AND VESSELS:  No pulmonary artery filling defect to suggest PE.  The heart is normal in size. No significant pericardial effusion. No  thoracic aortic aneurysm. Atherosclerotic calcifications including  the coronary arteries. Probable coronary artery stents also noted.      LUNGS, PLEURA, LARGE AIRWAYS:  Dense nodular partially confluent  infiltrate in the right upper lobe. Smaller nodular infiltrates in  the right lower lobe. Background pulmonary heterogeneity and linear  densities in each lung base similar to the prior exam. Uneven  bilateral pleural diaphragmatic thickening with calcifications  similar to the prior exam. The central airways are patent.      UPPER ABDOMEN:  Calcified splenic granulomas. Colonic diverticulosis.      BONES:  Degenerative endplate spurring in the thoracic spine.       Impression: Dense right chest infiltrate consistent with pneumonia. Follow-up to  ensure resolution after appropriate treatment recommended.      No evidence of PE.      Bilateral pleural diaphragmatic thickening with calcifications  similar to 08/19/2023.      Additional findings as described above.      MACRO:  None.      Signed by: Sadie Cochran 11/20/2024 8:20 AM  Dictation workstation:   [id number]  XR chest 1 view  Narrative: STUDY:  Chest Radiograph; 11/20/2024 6:24 AM  INDICATION:  Shortness of breath.  COMPARISON:  XR chest 08/26/2024, 05/29/2023.  ACCESSION NUMBER(S):  [id number]  ORDERING CLINICIAN:  FRANTZ BLACKWOOD  TECHNIQUE:  Frontal chest was obtained at 06:23:00 hours.  FINDINGS:  CARDIOMEDIASTINAL SILHOUETTE:  Cardiomediastinal silhouette is normal in size and configuration.     LUNGS:  Right mid and lower lung consolidation is demonstrated, most  compatible with pneumonia.  Small right pleural effusion is suggested.   Left lung is grossly clear.  No pneumothorax.     ABDOMEN:  No remarkable upper abdominal findings.     BONES:  No acute osseous changes.  Impression: Right mid and lower lung consolidation/pneumonia.  Small right pleural  effusion is suggested.  Signed by Thiago Calero MD      Physical Exam    Relevant Results               Assessment/Plan                  Assessment & Plan  Community acquired pneumonia of right middle lobe of lung    Acute respiratory failure with hypoxemia (Multi)      Haile Mcfarland is a 74 y.o. male presenting with past medical history of morbid obesity, hypertension, diabetes type 2, hyperlipidemia, coronary artery disease, status post angioplasty and multiple stents, chronic COPD not on home oxygen and a history of recurrent pneumonia on average once a year who presented emergency room with a shortness of breath.          Assessment and plan     Acute respiratory failure with hypoxia  Community-acquired pneumonia  COPD exacerbation   Seen and examined    Clinically better   Still wheezing   Mild cough   No fever or chills      CTA chest   Dense right chest infiltrate consistent with pneumonia. Follow-up to  ensure resolution after appropriate treatment recommended.  No evidence of PE.  Bilateral pleural diaphragmatic thickening with calcifications  similar to 08/19/2023.   On room air   Albuterol [place] nebulizer every 6 hours  Decrease Solu medrol to BID   Rocephin 1 g daily  Azithromycin 5 mg daily  Mucinex p.o. 600 twice daily    Morbid obese  Lifestyle modification     Hypertension  Resume losartan and Norco  Resume hydrochlorothiazide     Hyperlipidemia  Resume Lipitor daily     CAD  Resume Coreg p.o. twice  Resume aspirin  Resume Lipitor     Diabetes type 2 uncontrolled due to Steroid   Accu-Chek ACH  Insulin sliding scale  Lantus To 50 units BID   SSI   Resume metformin     DVT prophy  Lovenox daily    Elizabet Perez MD

## 2024-11-23 LAB
GLUCOSE BLD MANUAL STRIP-MCNC: 199 MG/DL (ref 74–99)
GLUCOSE BLD MANUAL STRIP-MCNC: 254 MG/DL (ref 74–99)
GLUCOSE BLD MANUAL STRIP-MCNC: 288 MG/DL (ref 74–99)
GLUCOSE BLD MANUAL STRIP-MCNC: 311 MG/DL (ref 74–99)

## 2024-11-23 PROCEDURE — 2500000002 HC RX 250 W HCPCS SELF ADMINISTERED DRUGS (ALT 637 FOR MEDICARE OP, ALT 636 FOR OP/ED): Performed by: STUDENT IN AN ORGANIZED HEALTH CARE EDUCATION/TRAINING PROGRAM

## 2024-11-23 PROCEDURE — 82947 ASSAY GLUCOSE BLOOD QUANT: CPT

## 2024-11-23 PROCEDURE — 1200000002 HC GENERAL ROOM WITH TELEMETRY DAILY

## 2024-11-23 PROCEDURE — 2500000002 HC RX 250 W HCPCS SELF ADMINISTERED DRUGS (ALT 637 FOR MEDICARE OP, ALT 636 FOR OP/ED): Performed by: HOSPITALIST

## 2024-11-23 PROCEDURE — 2500000001 HC RX 250 WO HCPCS SELF ADMINISTERED DRUGS (ALT 637 FOR MEDICARE OP): Performed by: HOSPITALIST

## 2024-11-23 PROCEDURE — 94640 AIRWAY INHALATION TREATMENT: CPT

## 2024-11-23 PROCEDURE — 2500000004 HC RX 250 GENERAL PHARMACY W/ HCPCS (ALT 636 FOR OP/ED): Performed by: HOSPITALIST

## 2024-11-23 RX ORDER — ALBUTEROL SULFATE 0.83 MG/ML
2.5 SOLUTION RESPIRATORY (INHALATION) 3 TIMES DAILY
Status: DISCONTINUED | OUTPATIENT
Start: 2024-11-23 | End: 2024-11-24 | Stop reason: HOSPADM

## 2024-11-23 ASSESSMENT — COGNITIVE AND FUNCTIONAL STATUS - GENERAL
MOBILITY SCORE: 23
CLIMB 3 TO 5 STEPS WITH RAILING: A LITTLE
DAILY ACTIVITIY SCORE: 24

## 2024-11-23 ASSESSMENT — PAIN - FUNCTIONAL ASSESSMENT
PAIN_FUNCTIONAL_ASSESSMENT: 0-10

## 2024-11-23 ASSESSMENT — PAIN SCALES - GENERAL
PAINLEVEL_OUTOF10: 0 - NO PAIN

## 2024-11-23 NOTE — CARE PLAN
Problem: Pain - Adult  Goal: Verbalizes/displays adequate comfort level or baseline comfort level  Outcome: Progressing     Problem: Safety - Adult  Goal: Free from fall injury  Outcome: Progressing     Problem: Discharge Planning  Goal: Discharge to home or other facility with appropriate resources  Outcome: Progressing     Problem: Chronic Conditions and Co-morbidities  Goal: Patient's chronic conditions and co-morbidity symptoms are monitored and maintained or improved  Outcome: Progressing     Problem: Diabetes  Goal: Achieve decreasing blood glucose levels by end of shift  Outcome: Progressing  Goal: Increase stability of blood glucose readings by end of shift  Outcome: Progressing  Goal: Decrease in ketones present in urine by end of shift  Outcome: Progressing  Goal: Maintain electrolyte levels within acceptable range throughout shift  Outcome: Progressing  Goal: Maintain glucose levels >70mg/dl to <250mg/dl throughout shift  Outcome: Progressing  Goal: No changes in neurological exam by end of shift  Outcome: Progressing  Goal: Learn about and adhere to nutrition recommendations by end of shift  Outcome: Progressing  Goal: Vital signs within normal range for age by end of shift  Outcome: Progressing  Goal: Increase self care and/or family involovement by end of shift  Outcome: Progressing  Goal: Receive DSME education by end of shift  Outcome: Progressing     Problem: Fall/Injury  Goal: Not fall by end of shift  Outcome: Progressing  Goal: Be free from injury by end of the shift  Outcome: Progressing  Goal: Verbalize understanding of personal risk factors for fall in the hospital  Outcome: Progressing  Goal: Verbalize understanding of risk factor reduction measures to prevent injury from fall in the home  Outcome: Progressing  Goal: Use assistive devices by end of the shift  Outcome: Progressing  Goal: Pace activities to prevent fatigue by end of the shift  Outcome: Progressing     Problem:  Respiratory  Goal: Clear secretions with interventions this shift  Outcome: Progressing  Goal: Minimize anxiety/maximize coping throughout shift  Outcome: Progressing  Goal: Minimal/no exertional discomfort or dyspnea this shift  Outcome: Progressing  Goal: No signs of respiratory distress (eg. Use of accessory muscles. Peds grunting)  Outcome: Progressing  Goal: Patent airway maintained this shift  Outcome: Progressing  Goal: Tolerate mechanical ventilation evidenced by VS/agitation level this shift  Outcome: Progressing  Goal: Tolerate pulmonary toileting this shift  Outcome: Progressing  Goal: Verbalize decreased shortness of breath this shift  Outcome: Progressing  Goal: Wean oxygen to maintain O2 saturation per order/standard this shift  Outcome: Progressing  Goal: Increase self care and/or family involvement in next 24 hours  Outcome: Progressing   The patient's goals for the shift include no pain    The clinical goals for the shift include patient will remain free from injury throughout shift    Over the shift, the patient did not make progress toward the following goals. Barriers to progression include none. Recommendations to address these barriers include continued education and reinforcement of care plan.

## 2024-11-23 NOTE — CARE PLAN
The patient's goals for the shift include no pain    The clinical goals for the shift include Patient will not experience SOB, his O2 will be >92% throughout thiss hift    Problem: Pain - Adult  Goal: Verbalizes/displays adequate comfort level or baseline comfort level  Outcome: Progressing     Problem: Safety - Adult  Goal: Free from fall injury  Outcome: Progressing     Problem: Diabetes  Goal: Achieve decreasing blood glucose levels by end of shift  Outcome: Progressing     Problem: Diabetes  Goal: Increase stability of blood glucose readings by end of shift  Outcome: Progressing     Problem: Diabetes  Goal: Vital signs within normal range for age by end of shift  Outcome: Progressing     Problem: Fall/Injury  Goal: Not fall by end of shift  Outcome: Progressing     Problem: Fall/Injury  Goal: Be free from injury by end of the shift  Outcome: Progressing

## 2024-11-24 VITALS
SYSTOLIC BLOOD PRESSURE: 142 MMHG | RESPIRATION RATE: 18 BRPM | OXYGEN SATURATION: 94 % | DIASTOLIC BLOOD PRESSURE: 64 MMHG | HEART RATE: 57 BPM | HEIGHT: 75 IN | TEMPERATURE: 97.2 F | WEIGHT: 285 LBS | BODY MASS INDEX: 35.43 KG/M2

## 2024-11-24 LAB
BACTERIA BLD CULT: NORMAL
BACTERIA BLD CULT: NORMAL
GLUCOSE BLD MANUAL STRIP-MCNC: 153 MG/DL (ref 74–99)
GLUCOSE BLD MANUAL STRIP-MCNC: 216 MG/DL (ref 74–99)

## 2024-11-24 PROCEDURE — 2500000002 HC RX 250 W HCPCS SELF ADMINISTERED DRUGS (ALT 637 FOR MEDICARE OP, ALT 636 FOR OP/ED): Performed by: HOSPITALIST

## 2024-11-24 PROCEDURE — 2500000002 HC RX 250 W HCPCS SELF ADMINISTERED DRUGS (ALT 637 FOR MEDICARE OP, ALT 636 FOR OP/ED): Performed by: STUDENT IN AN ORGANIZED HEALTH CARE EDUCATION/TRAINING PROGRAM

## 2024-11-24 PROCEDURE — 82947 ASSAY GLUCOSE BLOOD QUANT: CPT

## 2024-11-24 PROCEDURE — 94640 AIRWAY INHALATION TREATMENT: CPT

## 2024-11-24 PROCEDURE — 2500000004 HC RX 250 GENERAL PHARMACY W/ HCPCS (ALT 636 FOR OP/ED): Performed by: HOSPITALIST

## 2024-11-24 PROCEDURE — 2500000001 HC RX 250 WO HCPCS SELF ADMINISTERED DRUGS (ALT 637 FOR MEDICARE OP): Performed by: HOSPITALIST

## 2024-11-24 PROCEDURE — 99239 HOSP IP/OBS DSCHRG MGMT >30: CPT | Performed by: HOSPITALIST

## 2024-11-24 RX ORDER — CEFUROXIME AXETIL 250 MG/1
250 TABLET ORAL 2 TIMES DAILY
Qty: 14 TABLET | Refills: 0 | Status: SHIPPED | OUTPATIENT
Start: 2024-11-24 | End: 2024-12-01

## 2024-11-24 RX ORDER — METHYLPREDNISOLONE 4 MG/1
TABLET ORAL
Qty: 21 TABLET | Refills: 0 | Status: SHIPPED | OUTPATIENT
Start: 2024-11-24 | End: 2024-12-01

## 2024-11-24 RX ORDER — GUAIFENESIN 600 MG/1
600 TABLET, EXTENDED RELEASE ORAL 2 TIMES DAILY
Qty: 20 TABLET | Refills: 0 | Status: SHIPPED | OUTPATIENT
Start: 2024-11-24

## 2024-11-24 ASSESSMENT — PAIN SCALES - GENERAL: PAINLEVEL_OUTOF10: 0 - NO PAIN

## 2024-11-24 ASSESSMENT — COGNITIVE AND FUNCTIONAL STATUS - GENERAL
CLIMB 3 TO 5 STEPS WITH RAILING: A LITTLE
MOBILITY SCORE: 23
DAILY ACTIVITIY SCORE: 24

## 2024-11-24 NOTE — DISCHARGE SUMMARY
Discharge Diagnosis  Community acquired pneumonia of right middle lobe of lung    Issues Requiring Follow-Up  None     Discharge Meds     Medication List      START taking these medications     cefuroxime 250 mg tablet; Commonly known as: Ceftin; Take 1 tablet (250   mg) by mouth 2 times a day for 7 days.   guaiFENesin 600 mg 12 hr tablet; Commonly known as: Mucinex; Take 1   tablet (600 mg) by mouth 2 times a day. Do not crush, chew, or split.   methylPREDNISolone 4 mg tablets; Commonly known as: Medrol Dospak; Take   as directed on package.     CHANGE how you take these medications     * albuterol 2.5 mg /3 mL (0.083 %) nebulizer solution; Take 3 mL (2.5   mg) by nebulization every 6 hours if needed for wheezing.   * albuterol 90 mcg/actuation inhaler; Commonly known as: Ventolin HFA;   Inhale 2 puffs every 6 hours if needed for wheezing or shortness of   breath.   Lumigan 0.01 % ophthalmic solution; Generic drug: bimatoprost; INSTILL 1   DROP INTO RIGHT EYE AT BEDTIME; What changed: See the new instructions.   primidone 50 mg tablet; Commonly known as: Mysoline; 25 mg at bedtime   for a week, then 50 mg at bedtime for a week, then keep raising by 25 mg   each night until goal dose of 200 mg at bedtime; What changed: additional   instructions  * This list has 2 medication(s) that are the same as other medications   prescribed for you. Read the directions carefully, and ask your doctor or   other care provider to review them with you.     CONTINUE taking these medications     amLODIPine 10 mg tablet; Commonly known as: Norvasc; Take 1 tablet (10   mg) by mouth once daily in the evening.   aspirin 81 mg EC tablet   atorvastatin 80 mg tablet; Commonly known as: Lipitor; TAKE 1 TABLET BY   MOUTH ONCE DAILY AT BEDTIME   carvedilol 12.5 mg tablet; Commonly known as: Coreg; Take 1 tablet (12.5   mg) by mouth 2 times daily (morning and late afternoon).   clopidogrel 75 mg tablet; Commonly known as: Plavix; Take 1 tablet  "by   mouth once daily   Contour Next Test Strips strip; Generic drug: blood sugar diagnostic;   USE 1 STRIP TO CHECK GLUCOSE THREE TIMES DAILY   hydroCHLOROthiazide 25 mg tablet; Commonly known as: HYDRODiuril; TAKE 1   TABLET BY MOUTH ONCE DAILY IN THE MORNING   insulin glargine 300 unit/mL (1.5 mL) injection; Commonly known as:   Toujeo SoloStar U-300 Insulin; INJECT 50 UNITS SUBCUTANEOUSLY TWICE DAILY   ipratropium-albuteroL 0.5-2.5 mg/3 mL nebulizer solution; Commonly known   as: Duo-Neb; USE 1 AMPULE IN NEBULIZER EVERY 8 HOURS AS NEEDED FOR   WHEEZING FOR SHORTNESS OF BREATH   ketorolac 0.4 % ophthalmic solution; Commonly known as: Acular   losartan 50 mg tablet; Commonly known as: Cozaar; Take 1 tablet (50 mg)   by mouth once daily.   magnesium oxide 400 mg (241.3 mg magnesium) tablet; Commonly known as:   Mag-Ox   metFORMIN 1,000 mg tablet; Commonly known as: Glucophage; Take 0.5   tablets (500 mg) by mouth 2 times daily (morning and late afternoon).   nitroglycerin 0.4 mg SL tablet; Commonly known as: Nitrostat; DISSOLVE   ONE TABLET UNDER THE TONGUE EVERY 5 MINUTES AS NEEDED FOR CHEST PAIN.  DO   NOT EXCEED A TOTAL OF 3 DOSES IN 15 MINUTES   NovoLOG Flexpen U-100 Insulin 100 unit/mL (3 mL) pen; Generic drug:   insulin aspart   pen needle, diabetic 32 gauge x 5/32\" needle; USE 1 NEEDLE 3 TO 4 TIMES   DAILY AS NEEDED   prednisoLONE acetate 1 % ophthalmic suspension; Commonly known as:   Pred-Forte; Administer 1 drop into the left eye 2 times a day.   Simbrinza 1-0.2 % drops,suspension ophthalmic suspension; Generic drug:   brinzolamide-brimonidine; Administer 1 drop into the right eye 2 times a   day.   Trelegy Ellipta 100-62.5-25 mcg blister with device; Generic drug:   fluticasone-umeclidin-vilanter; Inhale 1 puff once daily. Rinse mouth with   water after use to reduce aftertaste and incidence of candidiasis. Do not   swallow.       Test Results Pending At Discharge  Pending Labs       Order Current Status "    Blood Culture Preliminary result    Blood Culture Preliminary result            Hospital Course     Haile Mcfarland is a 74 y.o. male presenting with past medical history of morbid obesity, hypertension, diabetes type 2, hyperlipidemia, coronary artery disease, status post angioplasty and multiple stents, chronic COPD not on home oxygen and a history of recurrent pneumonia on average once a year who presented emergency room with a shortness of breath.          Assessment and plan     Acute respiratory failure with hypoxia  Community-acquired pneumonia  COPD exacerbation   Seen and examined   Clinically better   Still wheezing   Mild cough   No fever or chills      CTA chest   Dense right chest infiltrate consistent with pneumonia. Follow-up to  ensure resolution after appropriate treatment recommended.  No evidence of PE.  Bilateral pleural diaphragmatic thickening with calcifications  similar to 08/19/2023.   On room air   Albuterol Bollinger nebulizer every 6 hours  Decrease Solu medrol to BID   Rocephin 1 g daily  Azithromycin 5 mg daily  Mucinex p.o. 600 twice daily  Discharged home on  days and Ceftin p.o. twice daily m  Discharged on Medrol Dosepak   Mucinex p.o. twice daily for 10 days        Morbid obese  Lifestyle modification     Hypertension  Resume losartan and Norco  Resume hydrochlorothiazide     Hyperlipidemia  Resume Lipitor daily     CAD  Resume Coreg p.o. twice  Resume aspirin  Resume Lipitor     Diabetes type 2 uncontrolled due to Steroid   Accu-Chek ACH  Insulin sliding scale  Lantus To 50 units BID   SSI   Resume metformin     DVT prophy  Lovenox daily      Pertinent Physical Exam At Time of Discharge  Physical Exam  Constitutional:       Appearance: Normal appearance.   HENT:      Head: Normocephalic and atraumatic.      Nose: Nose normal.      Mouth/Throat:      Mouth: Mucous membranes are moist.   Eyes:      Extraocular Movements: Extraocular movements intact.      Pupils: Pupils are equal,  round, and reactive to light.   Pulmonary:      CTA      Abdominal:      General: Abdomen is flat. Bowel sounds are normal.      Palpations: Abdomen is soft.   Musculoskeletal:         General: Normal range of motion.      Cervical back: Normal range of motion and neck supple.   Skin:     General: Skin is warm.   Neurological:      General: No focal deficit present.      Mental Status: He is alert and oriented to person, place, and time.   Psychiatric:         Mood and Affect: Mood normal.         Behavior: Behavior normal.       Outpatient Follow-Up  Future Appointments   Date Time Provider Department Center   1/8/2025  1:30 PM Mustapha Ozuna MD [id number] [place]   1/9/2025  1:00 PM Carol Briggs, APRN-[facility] [id number] [place]   1/17/2025 10:00 AM Mary Beth Vo, APRN-CNP [id number] [place]   1/22/2025  1:45 PM Andres Chapa MD [id number] [place]   2/11/2025 11:00 AM Kosta Sherwood MD [id number] [place]   3/14/2025 10:00 AM ELY ULTRASOUND 2 ELYUS [place]   3/20/2025  3:00 PM Morgan Tomlinson MD [id number] [place]   6/13/2025 10:30 AM Nolan Rosas MD [id number] [place]   9/10/2025  9:30 AM Mario Tatum MD [id number] [place]     Discharge time>30 min     Elizabet Perez MD

## 2024-11-24 NOTE — PROGRESS NOTES
11/24/24 1211   Discharge Planning   Expected Discharge Disposition Home     Wean to room air. HOME no new needs.

## 2024-11-24 NOTE — CARE PLAN
Problem: Pain - Adult  Goal: Verbalizes/displays adequate comfort level or baseline comfort level  11/24/2024 1440 by Erin Garcia RN  Outcome: Adequate for Discharge  11/24/2024 1100 by Erin Garcia RN  Flowsheets (Taken 11/24/2024 1100)  Verbalizes/displays adequate comfort level or baseline comfort level:   Encourage patient to monitor pain and request assistance   Assess pain using appropriate pain scale   Administer analgesics based on type and severity of pain and evaluate response   Implement non-pharmacological measures as appropriate and evaluate response   Consider cultural and social influences on pain and pain management   Notify Licensed Independent Practitioner if interventions unsuccessful or patient reports new pain     Problem: Safety - Adult  Goal: Free from fall injury  11/24/2024 1440 by Erin Garcia RN  Outcome: Adequate for Discharge  11/24/2024 1100 by Erin Garcia RN  Flowsheets (Taken 11/24/2024 1100)  Free from fall injury: Instruct family/caregiver on patient safety     Problem: Discharge Planning  Goal: Discharge to home or other facility with appropriate resources  11/24/2024 1440 by Erin Garcia RN  Outcome: Adequate for Discharge  11/24/2024 1100 by Erin Garcia RN  Flowsheets (Taken 11/24/2024 1100)  Discharge to home or other facility with appropriate resources:   Identify barriers to discharge with patient and caregiver   Arrange for needed discharge resources and transportation as appropriate   Identify discharge learning needs (meds, wound care, etc)   Refer to discharge planning if patient needs post-hospital services based on physician order or complex needs related to functional status, cognitive ability or social support system     Problem: Chronic Conditions and Co-morbidities  Goal: Patient's chronic conditions and co-morbidity symptoms are monitored and maintained or improved  11/24/2024 1440 by Erin Garcia RN  Outcome: Adequate for  Discharge  11/24/2024 1100 by Erin Garcia RN  Flowsheets (Taken 11/24/2024 1100)  Care Plan - Patient's Chronic Conditions and Co-Morbidity Symptoms are Monitored and Maintained or Improved:   Monitor and assess patient's chronic conditions and comorbid symptoms for stability, deterioration, or improvement   Collaborate with multidisciplinary team to address chronic and comorbid conditions and prevent exacerbation or deterioration   Update acute care plan with appropriate goals if chronic or comorbid symptoms are exacerbated and prevent overall improvement and discharge     Problem: Diabetes  Goal: Achieve decreasing blood glucose levels by end of shift  11/24/2024 1440 by Erin Garcia RN  Outcome: Adequate for Discharge  11/24/2024 1100 by Erin Garcia RN  Flowsheets (Taken 11/24/2024 1100)  Achieve decreasing blood glucose levels by end of shift: Med administration/monitoring of effect  Goal: Increase stability of blood glucose readings by end of shift  11/24/2024 1440 by Erin Garcia RN  Outcome: Adequate for Discharge  11/24/2024 1100 by Erin Garcia RN  Flowsheets (Taken 11/24/2024 1100)  Increase stability of blood glucose readings by end of shift: Med administration/monitoring of effect  Goal: Maintain electrolyte levels within acceptable range throughout shift  11/24/2024 1440 by Erin Garcia RN  Outcome: Adequate for Discharge  11/24/2024 1100 by Erin Garcia RN  Flowsheets (Taken 11/24/2024 1100)  Maintain electrolyte levels within acceptable range throughout shift:   Med administration/monitoring of effect   Monitor urine output  Goal: Maintain glucose levels >70mg/dl to <250mg/dl throughout shift  11/24/2024 1440 by Erin Garcia RN  Outcome: Adequate for Discharge  11/24/2024 1100 by Erin Garcia RN  Flowsheets (Taken 11/24/2024 1100)  Maintain glucose levels >70mg/dl to <250mg/dl throughout shift: Med administration/monitoring of effect  Goal: Learn about and adhere  to nutrition recommendations by end of shift  11/24/2024 1440 by Erin Garcia RN  Outcome: Adequate for Discharge  11/24/2024 1100 by Erin Garcia RN  Flowsheets (Taken 11/24/2024 1100)  Learn about and adhere to nutrition recommendations by end of shift: Ensure/encourage compliance with appropriate diet  Goal: Vital signs within normal range for age by end of shift  11/24/2024 1440 by Erin Garcia RN  Outcome: Adequate for Discharge  11/24/2024 1100 by Erin Garcia RN  Flowsheets (Taken 11/24/2024 1100)  Vital signs within normal range for age by end of shift: Med administration/monitoring of effect  Goal: Increase self care and/or family involovement by end of shift  11/24/2024 1440 by Erin Garcia RN  Outcome: Adequate for Discharge  11/24/2024 1100 by Erin Garcia RN  Flowsheets (Taken 11/24/2024 1100)  Increase self care and/or family involovement by end of shift: Self monitor blood glucose with staff oversight  Goal: Receive DSME education by end of shift  11/24/2024 1440 by Erin Garcia RN  Outcome: Adequate for Discharge  11/24/2024 1100 by Erin Garcia RN  Flowsheets (Taken 11/24/2024 1100)  Receive DSME education by end of shift: Provide patient centered education on Diabetic Self Management Education     Problem: Fall/Injury  Goal: Not fall by end of shift  Outcome: Adequate for Discharge  Goal: Be free from injury by end of the shift  Outcome: Adequate for Discharge  Goal: Verbalize understanding of personal risk factors for fall in the hospital  Outcome: Adequate for Discharge  Goal: Verbalize understanding of risk factor reduction measures to prevent injury from fall in the home  Outcome: Adequate for Discharge  Goal: Use assistive devices by end of the shift  Outcome: Adequate for Discharge  Goal: Pace activities to prevent fatigue by end of the shift  Outcome: Adequate for Discharge     Problem: Respiratory  Goal: Clear secretions with interventions this  shift  11/24/2024 1440 by Erin Garcia RN  Outcome: Adequate for Discharge  11/24/2024 1100 by Erin Garcia RN  Flowsheets (Taken 11/24/2024 1100)  Clear secretions with interventions this shift: Med administration/monitoring of effect  Goal: Minimize anxiety/maximize coping throughout shift  11/24/2024 1440 by Erin Garcia RN  Outcome: Adequate for Discharge  11/24/2024 1100 by Erin Garcia RN  Flowsheets (Taken 11/24/2024 1100)  Minimize anxiety/maximize coping throughout shift:   Med administration/monitoring of effect   Monitor pain/anxiety level  Goal: Minimal/no exertional discomfort or dyspnea this shift  11/24/2024 1440 by Erin Garcia RN  Outcome: Adequate for Discharge  11/24/2024 1100 by Erin Garcia RN  Flowsheets (Taken 11/24/2024 1100)  Minimal/no exertional discomfort or dyspnea this shift: Positioning to promote ventilation/comfort  Goal: Verbalize decreased shortness of breath this shift  11/24/2024 1440 by Erin Garcia RN  Outcome: Adequate for Discharge  11/24/2024 1100 by Erin Garcia RN  Flowsheets (Taken 11/24/2024 1100)  Verbalize decreased shortness of breath this shift: Encourage/provide pulmonary hygiene/secretion clearance  Goal: Wean oxygen to maintain O2 saturation per order/standard this shift  11/24/2024 1440 by Erin Garcia RN  Outcome: Adequate for Discharge  11/24/2024 1100 by Erin Garcia RN  Flowsheets (Taken 11/24/2024 1100)  Wean oxygen to maintain O2 saturation per order/standard this shift: Encourage activity/mobility  Goal: Increase self care and/or family involvement in next 24 hours  11/24/2024 1440 by Erin Garcia RN  Outcome: Adequate for Discharge  11/24/2024 1100 by Erin Garcia RN  Flowsheets (Taken 11/24/2024 1100)  Increase self care and/or family involvement in next 24 hours: Encourage activity/mobility     Problem: Skin  Goal: Participates in plan/prevention/treatment measures  11/24/2024 1440 by Erin Garcia  RN  Outcome: Adequate for Discharge  11/24/2024 1100 by Erin Garcia RN  Flowsheets (Taken 11/24/2024 1100)  Participates in plan/prevention/treatment measures: Increase activity/out of bed for meals  Goal: Prevent/manage excess moisture  11/24/2024 1440 by Erin Garcia RN  Outcome: Adequate for Discharge  11/24/2024 1100 by Erin Garcia RN  Flowsheets (Taken 11/24/2024 1100)  Prevent/manage excess moisture: Moisturize dry skin  Goal: Prevent/minimize sheer/friction injuries  11/24/2024 1440 by Erin Garcia RN  Outcome: Adequate for Discharge  11/24/2024 1100 by Erin Garcia RN  Flowsheets (Taken 11/24/2024 1100)  Prevent/minimize sheer/friction injuries: Increase activity/out of bed for meals  Goal: Promote/optimize nutrition  11/24/2024 1440 by Erin Garcia RN  Outcome: Adequate for Discharge  11/24/2024 1100 by Erin Garica RN  Flowsheets (Taken 11/24/2024 1100)  Promote/optimize nutrition:   Monitor/record intake including meals   Offer water/supplements/favorite foods   The patient's goals for the shift include no pain    The clinical goals for the shift include Patient will not have episodes of dyspnea throughout this shift.    Over the shift, the patient did make progress adequate for discharge toward care plan goals.

## 2024-11-24 NOTE — DOCUMENTATION CLARIFICATION NOTE
"    PATIENT:               RUSTY GALARZA  ACCT #:                  [id number]  MRN:                       [id number]  :                       1950  ADMIT DATE:       2024 5:51 AM  DISCH DATE:        2024 2:30 PM  RESPONDING PROVIDER #:        [id number]          PROVIDER RESPONSE TEXT:    Sepsis with respiratory organ dysfunction of Acute hypoxic respiratory failure    CDI QUERY TEXT:    Clarification      Instruction:    Based on your assessment of the patient and the clinical information, please provide the requested documentation by clicking on the appropriate radio button and enter any additional information if prompted.    Question: Please further clarify if a relationship exists between the Sepsis and acute organ dysfunction    When answering this query, please exercise your independent professional judgment. The fact that a question is being asked, does not imply that any particular answer is desired or expected.    The patient's clinical indicators include:  Clinical Information: 73 yo male admitted with RML pneumonia and acute hypoxic respiratory failure.    Clinical Indicators:   vital signs: T36.9 HR96 R20 /73 96% 5   WBC 22.8  Lactate 0.9   SIRS WBC 22.8 Tachycardia HR96  ED:\"Concern for potential sepsis.\"   progress note;\" Community acquired pneumonia of right middle lobe of lung  Acute respiratory failure with hypoxemia \"   chest x-ray:\" Right mid and lower lung consolidation is demonstrated, most  compatible with pneumonia. \"    Treatment:  Rocephin 1 gm IV Q24hrs -present  Methylprednisolone    Risk Factors:  acute hypoxic respiratory failure, pneumonia, COPD exac. , HTN, CAD, obesity, DM  Options provided:  -- Sepsis with respiratory organ dysfunction of Acute hypoxic respiratory failure  -- Other - I will add my own diagnosis  -- Refer to Clinical Documentation Reviewer    Query created by: Lis Regan on 2024 10:36 " AM      Electronically signed by:  GILMER SORIANO MD 11/24/2024 4:22 PM

## 2024-11-25 ENCOUNTER — PATIENT OUTREACH (OUTPATIENT)
Dept: PRIMARY CARE | Facility: CLINIC | Age: 74
End: 2024-11-25
Payer: MEDICARE

## 2024-11-25 NOTE — PROGRESS NOTES
Discharge Facility:[facility]  Discharge Diagnosis:Community acquired pneumonia  Admission Date:11/20/24  Discharge Date: 11/24/24    PCP Appointment Date:Message sent to office  Specialist Appointment Date: Cardiology 1/9/24, Pulmonology 1/17/24  Hospital Encounter and Summary Linked: Yes  See discharge assessment below for further details    Medications  Medications reviewed with patient/caregiver?: Yes (11/25/2024 10:09 AM)  Is the patient having any side effects they believe may be caused by any medication additions or changes?: No (11/25/2024 10:09 AM)  Does the patient have all medications ordered at discharge?: Yes (11/25/2024 10:09 AM)  Care Management Interventions: Provided patient education (11/25/2024 10:09 AM)  Prescription Comments: Cefttin, Mucinex, Medrol dospak (11/25/2024 10:09 AM)  Is the patient taking all medications as directed (includes completed medication regime)?: Yes (11/25/2024 10:09 AM)  Care Management Interventions: Provided patient education (11/25/2024 10:09 AM)  Medication Comments: Change Albuterol (11/25/2024 10:09 AM)    Appointments  Does the patient have a primary care provider?: Yes (11/25/2024 10:09 AM)  Care Management Interventions: Advised patient to make appointment (11/25/2024 10:09 AM)  Has the patient kept scheduled appointments due by today?: Yes (11/25/2024 10:09 AM)    Self Management  Has home health visited the patient within 72 hours of discharge?: Not applicable (11/25/2024 10:09 AM)  What Durable Medical Equipment (DME) was ordered?: N/A (11/25/2024 10:09 AM)    Patient Teaching  Does the patient have access to their discharge instructions?: Yes (11/25/2024 10:09 AM)  Care Management Interventions: Reviewed instructions with patient (11/25/2024 10:09 AM)  What is the patient's perception of their health status since discharge?: Improving (11/25/2024 10:09 AM)  Is the patient/caregiver able to teach back the hierarchy of who to call/visit for symptoms/problems?  PCP, Specialist, Home Health nurse, Urgent Care, ED, 911: Yes (11/25/2024 10:09 AM)    Wrap Up  Wrap Up Additional Comments: Patient is feeling much better today. He has all of his discharge medications. Patient has a follow up with pcp 1/8/25. Cardiology appointment scheduled for 1/9/25 and Pulmonology scheduled for 1/17/25. Message sent to pcp for sooner appt. No questions or concerns at this time. (11/25/2024 10:09 AM)

## 2024-11-27 ENCOUNTER — PATIENT OUTREACH (OUTPATIENT)
Dept: CARE COORDINATION | Facility: CLINIC | Age: 74
End: 2024-11-27
Payer: MEDICARE

## 2024-11-27 NOTE — PROGRESS NOTES
Outreach call to patient following up on appointment with primary care provider.  Discussed appointment, reviewed medications, and discussed plan of care.  Patient with no additional questions at this time.  Will continue to follow.      Alice DAWKINS, RN, [facility]  O: [phone]

## 2024-11-29 DIAGNOSIS — J44.9 CHRONIC OBSTRUCTIVE PULMONARY DISEASE, UNSPECIFIED COPD TYPE (MULTI): ICD-10-CM

## 2024-11-30 RX ORDER — IPRATROPIUM BROMIDE AND ALBUTEROL SULFATE 2.5; .5 MG/3ML; MG/3ML
SOLUTION RESPIRATORY (INHALATION)
Qty: 180 ML | Refills: 0 | Status: SHIPPED | OUTPATIENT
Start: 2024-11-30

## 2024-12-02 DIAGNOSIS — J44.9 CHRONIC OBSTRUCTIVE PULMONARY DISEASE, UNSPECIFIED COPD TYPE (MULTI): ICD-10-CM

## 2024-12-02 RX ORDER — IPRATROPIUM BROMIDE AND ALBUTEROL SULFATE 2.5; .5 MG/3ML; MG/3ML
SOLUTION RESPIRATORY (INHALATION)
Qty: 180 ML | Refills: 0 | Status: SHIPPED | OUTPATIENT
Start: 2024-12-02

## 2024-12-03 ENCOUNTER — TELEPHONE (OUTPATIENT)
Dept: PRIMARY CARE | Facility: CLINIC | Age: 74
End: 2024-12-03
Payer: MEDICARE

## 2024-12-03 DIAGNOSIS — I25.10 CAD S/P PERCUTANEOUS CORONARY ANGIOPLASTY: Primary | ICD-10-CM

## 2024-12-03 DIAGNOSIS — Z98.61 CAD S/P PERCUTANEOUS CORONARY ANGIOPLASTY: Primary | ICD-10-CM

## 2024-12-03 RX ORDER — METOPROLOL TARTRATE 25 MG/1
25 TABLET, FILM COATED ORAL 2 TIMES DAILY
Qty: 60 TABLET | Refills: 5 | Status: SHIPPED | OUTPATIENT
Start: 2024-12-03 | End: 2025-06-01

## 2024-12-03 NOTE — TELEPHONE ENCOUNTER
Leno @ [facility] called insurance is suggesting that he take a cardio selective medication better suited for COPD pts such as Atenolol or Metoprolol, will you be willing to change? Please advise

## 2024-12-10 ENCOUNTER — PATIENT OUTREACH (OUTPATIENT)
Dept: PRIMARY CARE | Facility: CLINIC | Age: 74
End: 2024-12-10
Payer: MEDICARE

## 2024-12-10 NOTE — PROGRESS NOTES
Call regarding recent hospitalization.  At time of outreach call the patient feels as if their condition has improved since discharge.  No questions or concerns.

## 2024-12-24 ENCOUNTER — PATIENT OUTREACH (OUTPATIENT)
Dept: CARE COORDINATION | Facility: CLINIC | Age: 74
End: 2024-12-24
Payer: MEDICARE

## 2024-12-24 NOTE — PROGRESS NOTES
Care Manager:  Outreach call to patient to support a smooth transition of care from recent admission.  Spoke with patient, reviewed discharge medications, discharge instructions, assessed social needs, and provided education on importance of follow-up appointment with provider.  Will continue to monitor through transition period.  Alice PARDON, RN, [facility]  O: [phone]

## 2025-01-03 ENCOUNTER — LAB (OUTPATIENT)
Dept: LAB | Facility: LAB | Age: 75
End: 2025-01-03
Payer: MEDICARE

## 2025-01-03 DIAGNOSIS — Z45.09 ENCOUNTER FOR LOOP RECORDER AT END OF BATTERY LIFE: ICD-10-CM

## 2025-01-03 DIAGNOSIS — I25.10 CAD S/P PERCUTANEOUS CORONARY ANGIOPLASTY: ICD-10-CM

## 2025-01-03 DIAGNOSIS — Z98.61 CAD S/P PERCUTANEOUS CORONARY ANGIOPLASTY: ICD-10-CM

## 2025-01-03 DIAGNOSIS — Z79.4 TYPE 2 DIABETES MELLITUS WITH BOTH EYES AFFECTED BY MILD NONPROLIFERATIVE RETINOPATHY WITHOUT MACULAR EDEMA, WITH LONG-TERM CURRENT USE OF INSULIN: ICD-10-CM

## 2025-01-03 DIAGNOSIS — E11.3293 TYPE 2 DIABETES MELLITUS WITH BOTH EYES AFFECTED BY MILD NONPROLIFERATIVE RETINOPATHY WITHOUT MACULAR EDEMA, WITH LONG-TERM CURRENT USE OF INSULIN: ICD-10-CM

## 2025-01-03 DIAGNOSIS — R55 SYNCOPE, UNSPECIFIED SYNCOPE TYPE: ICD-10-CM

## 2025-01-03 LAB
ANION GAP SERPL CALC-SCNC: 16 MMOL/L (ref 10–20)
BUN SERPL-MCNC: 30 MG/DL (ref 6–23)
CALCIUM SERPL-MCNC: 9.1 MG/DL (ref 8.6–10.3)
CHLORIDE SERPL-SCNC: 104 MMOL/L (ref 98–107)
CHOLEST SERPL-MCNC: 105 MG/DL (ref 0–199)
CHOLESTEROL/HDL RATIO: 3.4
CO2 SERPL-SCNC: 26 MMOL/L (ref 21–32)
CREAT SERPL-MCNC: 1.14 MG/DL (ref 0.5–1.3)
EGFRCR SERPLBLD CKD-EPI 2021: 67 ML/MIN/1.73M*2
EST. AVERAGE GLUCOSE BLD GHB EST-MCNC: 200 MG/DL
GLUCOSE SERPL-MCNC: 146 MG/DL (ref 74–99)
HBA1C MFR BLD: 8.6 %
HDLC SERPL-MCNC: 30.8 MG/DL
INR PPP: 1 (ref 0.9–1.1)
LDLC SERPL CALC-MCNC: 59 MG/DL
NON HDL CHOLESTEROL: 74 MG/DL (ref 0–149)
POTASSIUM SERPL-SCNC: 3.9 MMOL/L (ref 3.5–5.3)
PROTHROMBIN TIME: 11.3 SECONDS (ref 9.8–12.8)
SODIUM SERPL-SCNC: 142 MMOL/L (ref 136–145)
TRIGL SERPL-MCNC: 76 MG/DL (ref 0–149)
VLDL: 15 MG/DL (ref 0–40)

## 2025-01-03 PROCEDURE — 83036 HEMOGLOBIN GLYCOSYLATED A1C: CPT

## 2025-01-03 PROCEDURE — 80048 BASIC METABOLIC PNL TOTAL CA: CPT

## 2025-01-03 PROCEDURE — 85610 PROTHROMBIN TIME: CPT

## 2025-01-03 PROCEDURE — 80061 LIPID PANEL: CPT

## 2025-01-08 ENCOUNTER — APPOINTMENT (OUTPATIENT)
Dept: PRIMARY CARE | Facility: CLINIC | Age: 75
End: 2025-01-08
Payer: MEDICARE

## 2025-01-08 VITALS
SYSTOLIC BLOOD PRESSURE: 127 MMHG | TEMPERATURE: 96.6 F | DIASTOLIC BLOOD PRESSURE: 78 MMHG | WEIGHT: 294 LBS | BODY MASS INDEX: 41.16 KG/M2 | HEIGHT: 71 IN | HEART RATE: 68 BPM

## 2025-01-08 DIAGNOSIS — Z79.4 TYPE 2 DIABETES MELLITUS WITH OTHER CIRCULATORY COMPLICATION, WITH LONG-TERM CURRENT USE OF INSULIN: ICD-10-CM

## 2025-01-08 DIAGNOSIS — I25.10 CAD S/P PERCUTANEOUS CORONARY ANGIOPLASTY: ICD-10-CM

## 2025-01-08 DIAGNOSIS — Z12.5 PROSTATE CANCER SCREENING: ICD-10-CM

## 2025-01-08 DIAGNOSIS — J18.9 COMMUNITY ACQUIRED PNEUMONIA OF RIGHT MIDDLE LOBE OF LUNG: ICD-10-CM

## 2025-01-08 DIAGNOSIS — H34.8120 CENTRAL RETINAL VEIN OCCLUSION WITH MACULAR EDEMA OF LEFT EYE: Primary | ICD-10-CM

## 2025-01-08 DIAGNOSIS — Z98.61 CAD S/P PERCUTANEOUS CORONARY ANGIOPLASTY: ICD-10-CM

## 2025-01-08 DIAGNOSIS — E11.59 TYPE 2 DIABETES MELLITUS WITH OTHER CIRCULATORY COMPLICATION, WITH LONG-TERM CURRENT USE OF INSULIN: ICD-10-CM

## 2025-01-08 DIAGNOSIS — J44.1 CHRONIC OBSTRUCTIVE PULMONARY DISEASE WITH (ACUTE) EXACERBATION (MULTI): ICD-10-CM

## 2025-01-08 PROBLEM — N18.31 STAGE 3A CHRONIC KIDNEY DISEASE (MULTI): Status: ACTIVE | Noted: 2025-01-08

## 2025-01-08 PROBLEM — E66.01 CLASS 3 SEVERE OBESITY DUE TO EXCESS CALORIES WITH SERIOUS COMORBIDITY AND BODY MASS INDEX (BMI) OF 40.0 TO 44.9 IN ADULT: Status: ACTIVE | Noted: 2024-09-11

## 2025-01-08 PROBLEM — E66.813 CLASS 3 SEVERE OBESITY DUE TO EXCESS CALORIES WITH SERIOUS COMORBIDITY AND BODY MASS INDEX (BMI) OF 40.0 TO 44.9 IN ADULT: Status: ACTIVE | Noted: 2024-09-11

## 2025-01-08 PROBLEM — N18.31 STAGE 3A CHRONIC KIDNEY DISEASE (MULTI): Status: RESOLVED | Noted: 2025-01-08 | Resolved: 2025-01-08

## 2025-01-08 PROCEDURE — 99213 OFFICE O/P EST LOW 20 MIN: CPT | Performed by: INTERNAL MEDICINE

## 2025-01-08 PROCEDURE — 1159F MED LIST DOCD IN RCRD: CPT | Performed by: INTERNAL MEDICINE

## 2025-01-08 PROCEDURE — 3078F DIAST BP <80 MM HG: CPT | Performed by: INTERNAL MEDICINE

## 2025-01-08 PROCEDURE — 3048F LDL-C <100 MG/DL: CPT | Performed by: INTERNAL MEDICINE

## 2025-01-08 PROCEDURE — 1160F RVW MEDS BY RX/DR IN RCRD: CPT | Performed by: INTERNAL MEDICINE

## 2025-01-08 PROCEDURE — 4010F ACE/ARB THERAPY RXD/TAKEN: CPT | Performed by: INTERNAL MEDICINE

## 2025-01-08 PROCEDURE — 1123F ACP DISCUSS/DSCN MKR DOCD: CPT | Performed by: INTERNAL MEDICINE

## 2025-01-08 PROCEDURE — 3074F SYST BP LT 130 MM HG: CPT | Performed by: INTERNAL MEDICINE

## 2025-01-08 PROCEDURE — G2211 COMPLEX E/M VISIT ADD ON: HCPCS | Performed by: INTERNAL MEDICINE

## 2025-01-08 PROCEDURE — 1157F ADVNC CARE PLAN IN RCRD: CPT | Performed by: INTERNAL MEDICINE

## 2025-01-08 PROCEDURE — 3008F BODY MASS INDEX DOCD: CPT | Performed by: INTERNAL MEDICINE

## 2025-01-08 PROCEDURE — 3052F HG A1C>EQUAL 8.0%<EQUAL 9.0%: CPT | Performed by: INTERNAL MEDICINE

## 2025-01-08 PROCEDURE — 1036F TOBACCO NON-USER: CPT | Performed by: INTERNAL MEDICINE

## 2025-01-08 ASSESSMENT — ENCOUNTER SYMPTOMS
LOSS OF SENSATION IN FEET: 0
OCCASIONAL FEELINGS OF UNSTEADINESS: 0
DEPRESSION: 0
CHOKING: 0
CONSTITUTIONAL NEGATIVE: 1
CARDIOVASCULAR NEGATIVE: 1
SHORTNESS OF BREATH: 1
WEAKNESS: 0
DIZZINESS: 0
BACK PAIN: 0
GASTROINTESTINAL NEGATIVE: 1
COUGH: 0
ARTHRALGIAS: 0

## 2025-01-08 ASSESSMENT — COLUMBIA-SUICIDE SEVERITY RATING SCALE - C-SSRS
1. IN THE PAST MONTH, HAVE YOU WISHED YOU WERE DEAD OR WISHED YOU COULD GO TO SLEEP AND NOT WAKE UP?: NO
6. HAVE YOU EVER DONE ANYTHING, STARTED TO DO ANYTHING, OR PREPARED TO DO ANYTHING TO END YOUR LIFE?: NO
2. HAVE YOU ACTUALLY HAD ANY THOUGHTS OF KILLING YOURSELF?: NO

## 2025-01-08 ASSESSMENT — PATIENT HEALTH QUESTIONNAIRE - PHQ9
1. LITTLE INTEREST OR PLEASURE IN DOING THINGS: NOT AT ALL
2. FEELING DOWN, DEPRESSED OR HOPELESS: NOT AT ALL
SUM OF ALL RESPONSES TO PHQ9 QUESTIONS 1 AND 2: 0

## 2025-01-08 NOTE — PROGRESS NOTES
Subjective   Patient ID: Haile Mcfarland is a 74 y.o. male who presents for Follow-up (3 mo fu).  HPI  Heart Problem  This is a chronic problem. The current episode started more than 1 year ago. The problem occurs intermittently. The problem has been resolved. Associated symptoms include a visual change. Pertinent negatives include no abdominal pain, chills, coughing, diaphoresis or fatigue. The treatment provided mild relief.   Diabetes  He presents for his follow-up diabetic visit. He has type 2 diabetes mellitus. No MedicAlert identification noted. His disease course has been stable. Associated symptoms include blurred vision, foot paresthesias and visual change. Pertinent negatives for diabetes include no fatigue. Symptoms are stable. Diabetic complications include heart disease and peripheral neuropathy. Risk factors for coronary artery disease include dyslipidemia, hypertension and obesity. Current diabetic treatment includes insulin injections and oral agent (monotherapy). He is compliant with treatment all of the time. Rx around 8%  COPD: Patient complains of dyspnea. Symptoms began several years ago. Symptoms chronic dyspnea does worsen with exertion. Sputum is clear in small amounts. Fever has been no fever. Patient uses 1 pillows at night. Patient can walk 500 feet before resting. Patient currently is not on home oxygen therapy.. Respiratory history: chronic bronchitis, COPD, and emphysema, had recent admission.Pneumonia: Patient describes symptoms of shortness of breath on exertion. Symptoms began several days ago and are gradually improving since that time. Patient denies dyspnea. Treatment thus far includes oral antibiotics per medication orders: effective Past pulmonary history is significant for COPD.  Past Medical History  Past Medical History:   Diagnosis Date    Diabetes mellitus (Multi)        Social History  Social History     Tobacco Use    Smoking status: Never    Smokeless tobacco: Never    Vaping Use    Vaping status: Never Used   Substance Use Topics    Alcohol use: Never    Drug use: Never       Family History     Family History   Problem Relation Name Age of Onset    Tremor Father      Tremor Brother         Allergies:  Allergies   Allergen Reactions    Penicillins Other and Unknown     From childhood. Unsure of reaction.        Outpatient Medications:  Current Outpatient Medications   Medication Instructions    albuterol (Ventolin HFA) 90 mcg/actuation inhaler 2 puffs, inhalation, Every 6 hours PRN    albuterol 2.5 mg, nebulization, Every 6 hours PRN    amLODIPine (NORVASC) 10 mg, oral, Every evening    aspirin 81 mg EC tablet 1 tablet, oral, Daily    atorvastatin (LIPITOR) 80 mg, oral, Nightly    clopidogrel (PLAVIX) 75 mg, oral, Daily    Contour Next Test Strips strip USE 1 STRIP TO CHECK GLUCOSE THREE TIMES DAILY    fluticasone-umeclidin-vilanter (Trelegy Ellipta) 100-62.5-25 mcg blister with device 1 puff, inhalation, Daily, Rinse mouth with water after use to reduce aftertaste and incidence of candidiasis. Do not swallow.    guaiFENesin (MUCINEX) 600 mg, oral, 2 times daily, Do not crush, chew, or split.    hydroCHLOROthiazide (HYDRODIURIL) 25 mg, oral, Daily before breakfast    insulin glargine (Toujeo SoloStar U-300 Insulin) 300 unit/mL (1.5 mL) injection INJECT 50 UNITS SUBCUTANEOUSLY TWICE DAILY    ipratropium-albuteroL (Duo-Neb) 0.5-2.5 mg/3 mL nebulizer solution USE 1 AMPULE IN NEBULIZER EVERY 8 HOURS AS NEEDED FOR WHEEZING FOR SHORTNESS OF BREATH    ketorolac (Acular) 0.4 % ophthalmic solution 1 drop, Nightly    losartan (COZAAR) 50 mg, oral, Daily    Lumigan 0.01 % ophthalmic solution INSTILL 1 DROP INTO RIGHT EYE AT BEDTIME    magnesium oxide (Mag-Ox) 400 mg (241.3 mg magnesium) tablet 1 tablet, oral, Daily    metFORMIN (GLUCOPHAGE) 500 mg, oral, 2 times daily (morning and late afternoon)    metoprolol tartrate (LOPRESSOR) 25 mg, oral, 2 times daily    nitroglycerin (Nitrostat) 0.4  "mg SL tablet DISSOLVE ONE TABLET UNDER THE TONGUE EVERY 5 MINUTES AS NEEDED FOR CHEST PAIN.  DO NOT EXCEED A TOTAL OF 3 DOSES IN 15 MINUTES    NovoLOG FlexPen U-100 Insulin 100 unit/mL (3 mL) pen Inject under the skin 3 times a day before meals. Per Sliding Scale    pen needle, diabetic 32 gauge x 5/32\" needle USE 1 NEEDLE 3 TO 4 TIMES DAILY AS NEEDED    prednisoLONE acetate (Pred-Forte) 1 % ophthalmic suspension 1 drop, Left Eye, 2 times daily    primidone (Mysoline) 50 mg tablet 25 mg at bedtime for a week, then 50 mg at bedtime for a week, then keep raising by 25 mg each night until goal dose of 200 mg at bedtime    Simbrinza 1-0.2 % drops,suspension ophthalmic suspension 1 drop, Right Eye, 2 times daily        Review of Systems   Constitutional: Negative.    Eyes:  Positive for visual disturbance.   Respiratory:  Positive for shortness of breath. Negative for cough and choking.    Cardiovascular: Negative.    Gastrointestinal: Negative.    Musculoskeletal:  Negative for arthralgias and back pain.   Neurological:  Negative for dizziness and weakness.         Objective       Physical Exam  Vitals reviewed.   Constitutional:       Appearance: Normal appearance. He is obese.   HENT:      Head: Normocephalic.   Eyes:      Conjunctiva/sclera: Conjunctivae normal.   Cardiovascular:      Rate and Rhythm: Normal rate and regular rhythm.   Pulmonary:      Effort: Pulmonary effort is normal.      Breath sounds: Normal breath sounds.   Abdominal:      General: Abdomen is protuberant.      Palpations: Abdomen is soft.   Musculoskeletal:         General: Normal range of motion.      Cervical back: Neck supple.   Skin:     General: Skin is warm and dry.   Neurological:      General: No focal deficit present.   Psychiatric:         Mood and Affect: Mood normal.       Temp 35.9 °C (96.6 °F) (Temporal)   Ht 1.791 m (5' 10.5\")   Wt 133 kg (294 lb)   BMI 41.59 kg/m²      Assessment/Plan   Problem List Items Addressed This Visit  "      Central retinal vein occlusion with macular edema of left eye - Primary    CAD S/P percutaneous coronary angioplasty    Community acquired pneumonia of right middle lobe of lung     Other Visit Diagnoses       Chronic obstructive pulmonary disease with (acute) exacerbation (Multi)        Body mass index (BMI) 40.0-44.9, adult (Multi)        Type 2 diabetes mellitus with other circulatory complication, with long-term current use of insulin            Pt has moderate to severe COPD. It is progressive disease, use of MDI and proper technique discussed, rinse mouth after inhaled corticosteroids. Notify if progressive dyspnea or cough or lethargy, monitor oxygen saturation if possible with home based pulse oximetry. Avoid hospitalization and call us if any flare ups are about to happen, be sure that annual flu vaccine are uptodate and review need for pneumococcal vaccine. Light to moderate low impact exercises are very helpful and deep breathing  exercises are beneficial in chronic lung diseases.  He was hospitalized in month of November, there was a right lung infiltrate, it was a dense consolidation, basically patient has a poor pulmonary defense mechanism because of advanced obstructive lung disease and that is why he keeps on getting this recurrent pneumonia although last year he had only 1 episode.  He recovered, he is here for follow-up, he has a moderate to severe COPD, he was started on Mysoline but I do not see any document or note or follow-up, it seems like it was started by neurology for tremors he is not sure whether he takes it or not if he does not take it I told him he may not need it, albuterol usage can cause tremors, he has occasional tremors not a intense resting or intentional tremors.  No angina, ejection fraction does not help to be repeated, LDL is 59, A1c 8.6, A1c cannot be better.  He will continue his current regimen of diabetes and statins.  BP readings are excellent, all hospitalization  related data and information were reviewed, creatinine is normal so urine albumin will not be done at this time in the future it will be done although GFR is more than 60.  RSV vaccination can be taken, follow-up in 3 months with labs.

## 2025-01-09 ENCOUNTER — APPOINTMENT (OUTPATIENT)
Dept: CARDIOLOGY | Facility: CLINIC | Age: 75
End: 2025-01-09
Payer: MEDICARE

## 2025-01-09 ENCOUNTER — TELEPHONE (OUTPATIENT)
Dept: CARDIOLOGY | Facility: CLINIC | Age: 75
End: 2025-01-09

## 2025-01-09 NOTE — TELEPHONE ENCOUNTER
Pt came in today for appointment that was supposed to be with Carol but was cancelled on 12/02. Pt declined to reschedule at the time of call. I informed him of this before I could offer to reschedule patient walked away.

## 2025-01-10 ENCOUNTER — PATIENT OUTREACH (OUTPATIENT)
Dept: PRIMARY CARE | Facility: CLINIC | Age: 75
End: 2025-01-10
Payer: MEDICARE

## 2025-01-14 NOTE — PROGRESS NOTES
Patient: Haile Mcfarland    [id number]  : 1950 -- AGE 74 y.o.    Provider: NICOLAS Mccauley     Location [facility]   Service Date: 25          [facility]  Follow Up Visit Note    Virtual or Telephone Consent  A telephone visit (audio only) between the patient (at the originating site) and the provider (at the distant site) was utilized to provide this telehealth service.   Verbal consent was requested and obtained from Haile Mcfarland on this date, 25 for a telehealth visit.       HISTORY OF PRESENT ILLNESS       HISTORY OF PRESENT ILLNESS   Haile Mcfarland is a 74 y.o. male with a h/o COPD, Asthma, CHF, HTN, HLD and DM2, who is a never smoker, who presents to a [facility] for a follow up evaluation for COPD/Asthma .     I have independently interviewed and examined the patient in the office and reviewed available records.    Current History    Since last visit had a hospital stay for 4 days for shortness of breath and hemoptysis, was found to have pneumonia in th right lung, hemoptysis. Feeling back to baseline. Continues to uses Trelegy daily and albuterol PRN. Reviewed CT Chest with him today.    10/16/24: Since last visit he had an ED visit 24, admitted for 3 days for pneumonia.  He is feeling back to baseline.  Since starting Trelegy 100 he reports an improvement in his respiratory status.     24: Since last visit reports starting Trelegy 100 after our last visit, states it made a difference and improved his breathing.   Nebulizer- three times a day. Helps cough up mucus, clear to yellow in color.  Takes his time so he dose not get SOB.  Denies wheezing, chest tightness,SOB at rest and ER visits for breathing issues.  Allergies -  wears mask when cutting grass. No OTC allergy meds.    10/11/23: On today's visit, the patient reports he wento the ER for SOB and wheezing, he was admitted  for 3 days. He states he had pneumonia, after reviewing ED note he was diagnosed with a COPD exacerbation not pneumonia. He was discharged home with antibiotics, steroids and breo inhaler. He ran out of the Breo inhaler, he was using once a day. He is using his nebulizer 3 times a day. He was coughing up tan/green mucus but has decreased, he reports feeling better but not back to baseline. He reports feeling ROE sometimes. Denies wheezing, Sob at rest, chest pain, fevers and chills.     6/9/23: He had an ED visit 5/29/23 for SOB found to be having a congestive heart failure exacerbation, he was admitted for 3 days and sent home with lasix 20mg a day, he reports his bilateral leg swelling has decreased significantly in the past week. He has an appt with his cardiologist next week. He reports his respiratory status is stable, and that he is feeling better. He uses his symbicort 2 or 3 times a month and nebulizer every morning, never needs to use his proair. Occasionally coughs up clear mucus. He denies ROE unless he is really pushing himself. He wears a mask to cut his grass.     Previous pulmonary history: COPD possible asthma with airway remodeling, childhood asthma    Inhalers/nebulized medications: Symbicort, Breo, albuterol nebs    Hospitalization History: He has not been hospitalized over the last year for breathing related problem.    Sleep history: Denies snoring, apnea, feeling tired during the day or taking naps during the day.     ALLERGIES AND MEDICATIONS     ALLERGIES  Allergies   Allergen Reactions    Penicillins Other and Unknown     From childhood. Unsure of reaction.       MEDICATIONS  Current Outpatient Medications   Medication Sig Dispense Refill    albuterol (Ventolin HFA) 90 mcg/actuation inhaler Inhale 2 puffs every 6 hours if needed for wheezing or shortness of breath. 18 g 11    albuterol 2.5 mg /3 mL (0.083 %) nebulizer solution Take 3 mL (2.5 mg) by nebulization every 6 hours if needed for  wheezing. 360 mL 11    amLODIPine (Norvasc) 10 mg tablet Take 1 tablet (10 mg) by mouth once daily in the evening. 90 tablet 3    aspirin 81 mg EC tablet Take 1 tablet (81 mg) by mouth once daily.      atorvastatin (Lipitor) 80 mg tablet TAKE 1 TABLET BY MOUTH ONCE DAILY AT BEDTIME 90 tablet 3    clopidogrel (Plavix) 75 mg tablet Take 1 tablet by mouth once daily 90 tablet 0    Contour Next Test Strips strip USE 1 STRIP TO CHECK GLUCOSE THREE TIMES DAILY 250 each 0    fluticasone-umeclidin-vilanter (Trelegy Ellipta) 100-62.5-25 mcg blister with device Inhale 1 puff once daily. Rinse mouth with water after use to reduce aftertaste and incidence of candidiasis. Do not swallow. 1 each 6    guaiFENesin (Mucinex) 600 mg 12 hr tablet Take 1 tablet (600 mg) by mouth 2 times a day. Do not crush, chew, or split. 20 tablet 0    hydroCHLOROthiazide (HYDRODiuril) 25 mg tablet TAKE 1 TABLET BY MOUTH ONCE DAILY IN THE MORNING 90 tablet 3    insulin glargine (Toujeo SoloStar U-300 Insulin) 300 unit/mL (1.5 mL) injection INJECT 50 UNITS SUBCUTANEOUSLY TWICE DAILY (Patient taking differently: Inject 50 Units under the skin 2 times a day as needed.) 36 mL 3    ipratropium-albuteroL (Duo-Neb) 0.5-2.5 mg/3 mL nebulizer solution USE 1 AMPULE IN NEBULIZER EVERY 8 HOURS AS NEEDED FOR WHEEZING FOR SHORTNESS OF BREATH 180 mL 0    ketorolac (Acular) 0.4 % ophthalmic solution Administer 1 drop into the left eye once daily at bedtime.      losartan (Cozaar) 50 mg tablet Take 1 tablet (50 mg) by mouth once daily. 90 tablet 1    Lumigan 0.01 % ophthalmic solution INSTILL 1 DROP INTO RIGHT EYE AT BEDTIME (Patient taking differently: Administer 1 drop into the right eye once daily at bedtime.) 3 mL 0    magnesium oxide (Mag-Ox) 400 mg (241.3 mg magnesium) tablet Take 1 tablet (400 mg) by mouth once daily.      metFORMIN (Glucophage) 1,000 mg tablet Take 0.5 tablets (500 mg) by mouth 2 times daily (morning and late afternoon). 90 tablet 3     "metoprolol tartrate (Lopressor) 25 mg tablet Take 1 tablet (25 mg) by mouth 2 times a day. 60 tablet 5    nitroglycerin (Nitrostat) 0.4 mg SL tablet DISSOLVE ONE TABLET UNDER THE TONGUE EVERY 5 MINUTES AS NEEDED FOR CHEST PAIN.  DO NOT EXCEED A TOTAL OF 3 DOSES IN 15 MINUTES 25 tablet 0    NovoLOG FlexPen U-100 Insulin 100 unit/mL (3 mL) pen Inject under the skin 3 times a day before meals. Per Sliding Scale      pen needle, diabetic 32 gauge x 5/32\" needle USE 1 NEEDLE 3 TO 4 TIMES DAILY AS NEEDED 300 each 0    prednisoLONE acetate (Pred-Forte) 1 % ophthalmic suspension Administer 1 drop into the left eye 2 times a day. 10 mL 11    primidone (Mysoline) 50 mg tablet 25 mg at bedtime for a week, then 50 mg at bedtime for a week, then keep raising by 25 mg each night until goal dose of 200 mg at bedtime (Patient taking differently: 25 mg at bedtime for one week, then 50 mg at bedtime for one week, then increase weekly by 25 mg, until goal dose of 200 mg at bedtime.) 120 tablet 5    Simbrinza 1-0.2 % drops,suspension ophthalmic suspension Administer 1 drop into the right eye 2 times a day. 10 mL 11     No current facility-administered medications for this visit.         PAST HISTORY     PAST MEDICAL HISTORY  COPD  Asthma  CHF  HTN  HLD  DM2    PAST SURGICAL HISTORY  Past Surgical History:   Procedure Laterality Date    CARDIAC ELECTROPHYSIOLOGY PROCEDURE Left 7/9/2024    Procedure: Loop Recorder Explant;  Surgeon: Brain Valiente MD;  Location: [facility];  Service: Electrophysiology;  Laterality: Left;    CATARACT EXTRACTION      CT ANGIO NECK  02/03/2021    CT NECK ANGIO W AND WO IV CONTRAST 2/3/2021 [facility] CLINICAL LEGACY    OTHER SURGICAL HISTORY  04/24/2019    Cervical laminectomy    OTHER SURGICAL HISTORY  10/21/2021    Knee replacement    OTHER SURGICAL HISTORY  10/21/2021    Percutaneous transluminal coronary angioplasty    OTHER SURGICAL HISTORY  10/21/2021    Kidney surgery    OTHER SURGICAL HISTORY  " 10/21/2021    Neck surgery    OTHER SURGICAL HISTORY  10/21/2021    Colonoscopy    OTHER SURGICAL HISTORY  10/21/2021    Tonsillectomy    OTHER SURGICAL HISTORY  10/21/2021    PTA carotid       IMMUNIZATION HISTORY  Immunization History   Administered Date(s) Administered    COVID-19, mRNA, LNP-S, PF, 30 mcg/0.3 mL dose 03/12/2021, 04/03/2021, 10/13/2021    Flu vaccine, quadrivalent, high-dose, preservative free, age 65y+ (FLUZONE) 01/04/2022, 09/07/2023    Flu vaccine, trivalent, preservative free, HIGH-DOSE, age 65y+ (Fluzone) 01/16/2018, 09/26/2019, 10/08/2019, 11/15/2020, 11/16/2020, 09/18/2022, 10/02/2024    Influenza, Unspecified 08/30/2018    Influenza, seasonal, injectable 11/07/2015, 10/22/2018, 11/22/2020    Pfizer COVID-19 vaccine, 12 years and older, (30mcg/0.3mL) (Comirnaty) 11/11/2023    Pneumococcal conjugate vaccine, 13-valent (PREVNAR 13) 10/23/2018    Pneumococcal conjugate vaccine, 20-valent (PREVNAR 20) 02/27/2023    Zoster vaccine, recombinant, adult (SHINGRIX) 02/27/2023, 07/10/2023       SOCIAL HISTORY  Smoking: never  Illicit drug use: none   Drinking alcohol: none    OCCUPATIONAL/ENVIRONMENTAL HISTORY  Previously worked as: Retired form the [employer].   Exposure to asbestos, silica, beryllium or inhaled metals.  No exposure to birds or exotic animals.    FAMILY HISTORY  No family history of pulmonary disease.  No family history of cancer.  No family history of autoimmune disorders.    REVIEW OF SYSTEMS     REVIEW OF SYSTEMS  Review of Systems    Constitutional: No fever, no chills, no night sweats.    Eyes: No double vision, no floaters, no dry eyes.   ENT: See HPI.   Neck: No neck stiffness.  Cardiovascular: No sharp chest pain, no heart racing, no leg swelling.  Respiratory: as noted in HPI.   Integumentary: No rashes or sores.  Neurological: No dizziness, no headaches. Sleeping well.  Psychiatric: No mood changes.       PHYSICAL EXAM     VITAL SIGNS:   There were no vitals filed for this  visit.        CURRENT WEIGHT: There is no height or weight on file to calculate BMI.      PREVIOUS WEIGHTS:  Wt Readings from Last 3 Encounters:   01/08/25 133 kg (294 lb)   11/20/24 129 kg (285 lb)   10/24/24 135 kg (297 lb)       Physical Exam    Constitutional: General appearance: Alert and oriented.  No acute distress.   Psychiatric: Intact judgement and insight.    RESULTS/DATA     Pulmonary Function Test Results                     Chest Radiograph     Chest Radiograph;  8/26/2024   INDICATION:  Chest pain.  COMPARISON:  Chest radiograph and CTA chest 5/29/2023.  ACCESSION NUMBER(S):  [id number]  ORDERING CLINICIAN:  ADALI AMAYA  TECHNIQUE:  Frontal chest was obtained at 09:29 hours.  FINDINGS:  CARDIOMEDIASTINAL SILHOUETTE:  Cardiomediastinal silhouette is normal in size and configuration.     LUNGS:  There are bilateral pulmonary infiltrates most pronounced on the  right.  There is blunting the costophrenic angles and small pleural  effusions cannot be excluded.  ABDOMEN:  No remarkable upper abdominal findings.     BONES:  No acute osseous changes.  IMPRESSION:  Bilateral pulmonary infiltrates right greater than left.  Signed by Rafiq Templeton MD    Chest CT Scan     CT angio chest for pulmonary embolism 08/19/2023    Narrative  Interpreted By:  RENARD FREEMAN DO  MRN: [id number]  Patient Name: RUSTY GALARZA    STUDY:  CT ANGIO CHEST FOR PE;  8/19/2023 9:31 pm    INDICATION:  sob    COMPARISON:  CT angiogram chest 05/29/2023. Chest x-ray 08/19/2023    ACCESSION NUMBER(S):  [id number]    ORDERING CLINICIAN:  JONEL CARR    TECHNIQUE:  Helical data acquisition of the chest was obtained following the  uneventful administration of  75 milliliter OMNIPAQUE 350intravenous  contrast material. Images were reformatted in axial, coronal, and  sagittal planes. MIP images were created and reviewed.    FINDINGS:  POTENTIAL LIMITATIONS OF THE STUDY: Motion artifact.    HEART AND VESSELS:  No discrete filling  defects within the main pulmonary artery or its  branches.    No thoracic aortic aneurysm or acute dissection.  The main pulmonary artery is dilated.  The heart is not enlarged.  Coronary artery calcifications are noted.  No evidence of pericardial effusion.    MEDIASTINUM AND GRETCHEN, LOWER NECK AND AXILLA:  The visualized thyroid gland is within normal limits.    No evidence of thoracic lymphadenopathy by CT criteria.    LUNGS AND AIRWAYS:  The trachea and central airways are patent.    The evaluation of the lungs is degraded by motion artifact. Mild  bilateral atelectasis/scarring. Redemonstration of trace right  pleural effusion. No focal consolidation. No pneumothorax.  Redemonstration of bilateral calcified pleural plaque.    UPPER ABDOMEN:  Calcific foci at the spleen are probably representing granulomas.  There is colonic diverticulosis    CHEST WALL AND OSSEOUS STRUCTURES:  There is mild bilateral gynecomastia.  A cardiac monitor device is noted at the soft tissues of the left  anterior chest wall.  Multilevel degenerative changes at the spine.    Impression  1.  No evidence of pulmonary emboli.  2.  Mild bilateral atelectasis/scarring. Trace right pleural  effusion. Bilateral calcified pleural plaque.  3.  Coronary artery calcifications. Dilated main pulmonary artery,  may be seen with pulmonary arterial hypertension.      Echocardiogram     Echocardiogram 5/31/2023    [place] [id number]  Tel [phone] Fax [phone]    TRANSTHORACIC ECHOCARDIOGRAM REPORT      Patient Name:     RUSTY GALARZA   Reading Physician:  [id number] Scott Askew MD, [facility]  Study Date:       5/31/2023          Referring           JAZMYN QUINTANILLA  Physician:  MRN/PID:          [id number]           PCP:  Accession/Order#: [id number]          [facility]  Location:           Lab  YOB: 1950          Fellow:  Gender:           M                   Nurse:  Admit Date:       5/29/2023          Sonographer:        Re Gil [facility]  Admission Status: Inpatient -        Additional Staff:  Routine  Height:           190.00 cm          CC Report to:       Darianajeanine EMCLocation  Weight:           129.00 kg          Study Type:         Echocardiogram  BSA:              2.54 m2  Blood Pressure: 109 /54 mmHg    Diagnosis/ICD: R06.00-Dyspnea, unspecified  Indication:    Dyspnea  Procedure/CPT: Echo Complete w Full [id number]    Patient History:  Diabetes:          Yes  Pertinent History: COPD, HTN, Hyperlipidemia, CAD and PCI x5.    Study Detail: The following Echo studies were performed: 2D, M-Mode, Doppler and  color flow. Technically challenging study due to poor acoustic  windows, prominent lung artifact and body habitus. A bubble study  was not performed.      PHYSICIAN INTERPRETATION:  Left Ventricle: Left ventricular systolic function is normal, with an estimated ejection fraction of 65-70%. There are no regional wall motion abnormalities. The left ventricular cavity size is normal. The left ventricular septal wall thickness is normal. There is normal left ventricular posterior wall thickness. Spectral Doppler shows an impaired relaxation pattern of left ventricular diastolic filling.  Left Atrium: The left atrium is mildly dilated. Left atrial volume index 32.1 mL/m2.  Right Ventricle: The right ventricle is normal in size. There is normal right ventricular global systolic function. Normal right ventricular chamber size and function.  Right Atrium: The right atrium is normal in size.  Aortic Valve: The aortic valve is trileaflet. There is minimal aortic valve cusp calcification. There is trace to mild aortic valve regurgitation. The peak instantaneous gradient of the aortic valve is 12.1 mmHg. The mean gradient of the aortic valve is 6.0 mmHg. Trivial to 1+ aortic regurgitation.  Mitral Valve: The mitral valve is normal in structure. There is trace mitral  valve regurgitation.  Tricuspid Valve: The tricuspid valve is structurally normal. There is trace to mild tricuspid regurgitation. Trivial to 1+ tricuspid regurgitation with estimated RVSP 47 mmHg consistent with moderately increased right sided pressures.  Pulmonic Valve: The pulmonic valve is structurally normal. There is no indication of pulmonic valve regurgitation.  Pericardium: There is no pericardial effusion noted. There is a pericardial fat pad present.  Aorta: The aortic root is normal.  Systemic Veins: There is IVC inspiratory collapse greater than 50%.      CONCLUSIONS:  1. Left ventricular systolic function is normal with a 65-70% estimated ejection fraction.  2. Spectral Doppler shows an impaired relaxation pattern of left ventricular diastolic filling.  3. Normal right ventricular chamber size and function.  4. Trivial to 1+ tricuspid regurgitation with estimated RVSP 47 mmHg consistent with moderately increased right sided pressures.  5. Trivial to 1+ aortic regurgitation.  6. Comparison study dated January 23, 2021 showed estimated LV ejection fraction 60 to 65% with trivial tricuspid regurgitation. RVSP was unable to be estimated.    QUANTITATIVE DATA SUMMARY:  2D MEASUREMENTS:  Normal Ranges:  Ao Root d:     3.30 cm   (2.0-3.7cm)  LAs:           4.30 cm   (2.7-4.0cm)  IVSd:          1.16 cm   (0.6-1.1cm)  LVPWd:         1.12 cm   (0.6-1.1cm)  LVIDd:         5.40 cm   (3.9-5.9cm)  LVIDs:         2.70 cm  LV Mass Index: 96.9 g/m2  LV % FS        50.0 %    LA VOLUME:  Normal Ranges:  LA Vol A4C:        74.8 ml    (22+/-6mL/m2)  LA Vol A2C:        81.6 ml  LA Vol BP:         84.4 ml  LA Vol Index A4C:  29.4ml/m2  LA Vol Index A2C:  32.1 ml/m2  LA Vol Index BP:   33.2 ml/m2  LA Area A4C:       21.8 cm2  LA Area A2C:       24.6 cm2  LA Major Axis A4C: 5.4 cm  LA Major Axis A2C: 6.3 cm  LA Volume Index:   30.7 ml/m2  LA Vol A4C:        69.0 ml  LA Vol A2C:        79.0 ml    RA VOLUME BY A/L  METHOD:  Normal Ranges:  RA Vol A4C:        27.2 ml    (8.3-19.5ml)  RA Vol Index A4C:  10.7 ml/m2  RA Area A4C:       12.9 cm2  RA Major Axis A4C: 5.2 cm    LV SYSTOLIC FUNCTION BY 2D PLANIMETRY (MOD):  Normal Ranges:  EF-A4C View: 75.2 % (>=55%)  EF-A2C View: 62.7 %  EF-Biplane:  68.5 %    LV DIASTOLIC FUNCTION:  Normal Ranges:  MV Peak E:      0.81 m/s   (0.7-1.2 m/s)  MV Peak A:      0.87 m/s   (0.42-0.7 m/s)  E/A Ratio:      0.93       (1.0-2.2)  MV e'           0.11 m/s   (>8.0)  MV lateral e'   0.11 m/s  MV medial e'    0.07 m/s  E/e' Ratio:     7.35       (<8.0)  PulmV Sys Morris:  86.20 cm/s  PulmV Russell Morris: 47.60 cm/s  PulmV S/D Morris:  1.80    MITRAL VALVE:  Normal Ranges:  MV DT: 238 msec (150-240msec)    AORTIC VALVE:  Normal Ranges:  AoV Vmax:                1.74 m/s  (<=1.7m/s)  AoV Peak P.1 mmHg (<20mmHg)  AoV Mean P.0 mmHg  (1.7-11.5mmHg)  LVOT Max Morris:            1.15 m/s  (<=1.1m/s)  AoV VTI:                 37.70 cm  (18-25cm)  LVOT VTI:                26.70 cm  LVOT Diameter:           2.00 cm   (1.8-2.4cm)  AoV Area, VTI:           2.22 cm2  (2.5-5.5cm2)  AoV Area,Vmax:           2.08 cm2  (2.5-4.5cm2)  AoV Dimensionless Index: 0.71    RIGHT VENTRICLE:  RV 1 3.9 cm  RV 2 3.3 cm  RV 3 7.2 cm    TRICUSPID VALVE/RVSP:  Normal Ranges:  Peak TR Velocity: 3.34 m/s  RV Syst Pressure: 47.6 mmHg (< 30mmHg)    PULMONIC VALVE:  Normal Ranges:  PV Accel Time: 98 msec  (>120ms)  PV Max Morris:    1.3 m/s  (0.6-0.9m/s)  PV Max P.2 mmHg  PV Mean P.0 mmHg  PV VTI:        30.90 cm    Pulmonary Veins:  PulmV Russell Morris: 47.60 cm/s  PulmV S/D Morris:  1.80  PulmV Sys Morris:  86.20 cm/s      [id number] Scott Askew MD, FACC  Electronically signed on 2023 at 12:58:23 PM      ASSESSMENT/PLAN     Mr. Mcfarland is a 74 y.o. male with a h/o COPD, Asthma, CHF, HTN, HLD and DM2, who is a never smoker, who presents to a [facility] for a follow up  evaluation for COPD/Asthma .     *Bilateral calcified pleural plaque on 8/19/23 CT/PE *    Problem List and Orders  Diagnoses and all orders for this visit:  Chronic bronchitis, unspecified chronic bronchitis type (CMS/[facility])  -     fluticasone-umeclidin-vilanter (Trelegy Ellipta) 100-62.5-25 mcg blister with device; Inhale 1 puff once daily. RINSE  MOUTH AFTER EACH USE TO AVOID ORAL THRUSH.  Obstructive airway disease (CMS/[facility])  -     Aerosol Machine for home use - Purchase  Pneumonia due to infectious organism, unspecified laterality, unspecified part of lung  -     XR chest 2 views; Future    Assessment and Plan / Recommendations:  Problem List Items Addressed This Visit    None     COPD/Asthma overlap; (severe obstruction) with airway remodeling  - Continue Trelegy 100- 1 puff once day - rinse mouth after each use  - Continue albuterol HFA inhaler or albuterol nebulizer every 4-6 hours as needed    Community Acquired Pneumonia  - Will get CXR for pneumonia resolution     Follow in up 6 months or sooner if needed.    If you have any questions please call the office [phone]    Thank you for visiting the Pulmonary clinic today!   Mary Beth Vo CNP  [phone]

## 2025-01-17 ENCOUNTER — APPOINTMENT (OUTPATIENT)
Dept: PULMONOLOGY | Facility: CLINIC | Age: 75
End: 2025-01-17
Payer: COMMERCIAL

## 2025-01-17 DIAGNOSIS — J44.9 OBSTRUCTIVE AIRWAY DISEASE (MULTI): ICD-10-CM

## 2025-01-17 DIAGNOSIS — J45.50 SEVERE PERSISTENT ASTHMA WITHOUT COMPLICATION (MULTI): ICD-10-CM

## 2025-01-17 DIAGNOSIS — J44.9 CHRONIC OBSTRUCTIVE PULMONARY DISEASE, UNSPECIFIED COPD TYPE (MULTI): ICD-10-CM

## 2025-01-17 DIAGNOSIS — J18.9 PNEUMONIA DUE TO INFECTIOUS ORGANISM, UNSPECIFIED LATERALITY, UNSPECIFIED PART OF LUNG: Primary | ICD-10-CM

## 2025-01-17 PROCEDURE — 3048F LDL-C <100 MG/DL: CPT

## 2025-01-17 PROCEDURE — 3052F HG A1C>EQUAL 8.0%<EQUAL 9.0%: CPT

## 2025-01-17 PROCEDURE — 4010F ACE/ARB THERAPY RXD/TAKEN: CPT

## 2025-01-17 PROCEDURE — 1159F MED LIST DOCD IN RCRD: CPT

## 2025-01-17 PROCEDURE — 1157F ADVNC CARE PLAN IN RCRD: CPT

## 2025-01-17 PROCEDURE — 99214 OFFICE O/P EST MOD 30 MIN: CPT

## 2025-01-17 PROCEDURE — 1123F ACP DISCUSS/DSCN MKR DOCD: CPT

## 2025-01-17 ASSESSMENT — COPD QUESTIONNAIRES
CAT_TOTALSCORE: 9
QUESTION3_CHESTTIGHTNESS: 0 - MY CHEST DOES NOT FEEL TIGHT AT ALL
QUESTION5_HOMEACTIVITIES: 1
QUESTION8_ENERGYLEVEL: 1
QUESTION1_COUGHFREQUENCY: 2
QUESTION2_CHESTPHLEGM: 1
QUESTION7_SLEEPQUALITY: 0 - I SLEEP SOUNDLY
QUESTION6_LEAVINGHOUSE: 1
QUESTION4_WALKINCLINE: 3

## 2025-01-17 ASSESSMENT — COLUMBIA-SUICIDE SEVERITY RATING SCALE - C-SSRS
6. HAVE YOU EVER DONE ANYTHING, STARTED TO DO ANYTHING, OR PREPARED TO DO ANYTHING TO END YOUR LIFE?: NO
1. IN THE PAST MONTH, HAVE YOU WISHED YOU WERE DEAD OR WISHED YOU COULD GO TO SLEEP AND NOT WAKE UP?: NO
2. HAVE YOU ACTUALLY HAD ANY THOUGHTS OF KILLING YOURSELF?: NO

## 2025-01-22 ENCOUNTER — APPOINTMENT (OUTPATIENT)
Dept: OPHTHALMOLOGY | Facility: CLINIC | Age: 75
End: 2025-01-22
Payer: MEDICARE

## 2025-01-22 DIAGNOSIS — H34.8120 CENTRAL RETINAL VEIN OCCLUSION WITH MACULAR EDEMA OF LEFT EYE: Primary | ICD-10-CM

## 2025-01-22 DIAGNOSIS — E11.39 NEOVASCULAR GLAUCOMA DUE TO TYPE 2 DIABETES MELLITUS (MULTI): ICD-10-CM

## 2025-01-22 DIAGNOSIS — H43.822 VITREOMACULAR TRACTION SYNDROME OF LEFT EYE: ICD-10-CM

## 2025-01-22 DIAGNOSIS — H42 NEOVASCULAR GLAUCOMA DUE TO TYPE 2 DIABETES MELLITUS (MULTI): ICD-10-CM

## 2025-01-22 PROCEDURE — 92134 CPTRZ OPH DX IMG PST SGM RTA: CPT | Mod: BILATERAL PROCEDURE | Performed by: OPHTHALMOLOGY

## 2025-01-22 PROCEDURE — 99213 OFFICE O/P EST LOW 20 MIN: CPT | Performed by: OPHTHALMOLOGY

## 2025-01-22 ASSESSMENT — CUP TO DISC RATIO
OS_RATIO: 0.3
OD_RATIO: 0.5

## 2025-01-22 ASSESSMENT — ENCOUNTER SYMPTOMS
PSYCHIATRIC NEGATIVE: 0
NEUROLOGICAL NEGATIVE: 0
CARDIOVASCULAR NEGATIVE: 0
GASTROINTESTINAL NEGATIVE: 0
ALLERGIC/IMMUNOLOGIC NEGATIVE: 0
EYES NEGATIVE: 1
MUSCULOSKELETAL NEGATIVE: 0
ENDOCRINE NEGATIVE: 1
CONSTITUTIONAL NEGATIVE: 0
RESPIRATORY NEGATIVE: 0
HEMATOLOGIC/LYMPHATIC NEGATIVE: 0

## 2025-01-22 ASSESSMENT — VISUAL ACUITY
OD_CC: CF AT 3'
OS_CC: 20/30
METHOD: SNELLEN - LINEAR

## 2025-01-22 ASSESSMENT — PACHYMETRY
OS_CT(UM): 656
OD_CT(UM): 700

## 2025-01-22 ASSESSMENT — TONOMETRY
IOP_METHOD: GOLDMANN APPLANATION
OD_IOP_MMHG: 13
OS_IOP_MMHG: 16

## 2025-01-22 ASSESSMENT — EXTERNAL EXAM - LEFT EYE: OS_EXAM: NORMAL

## 2025-01-22 ASSESSMENT — SLIT LAMP EXAM - LIDS
COMMENTS: NORMAL
COMMENTS: NORMAL

## 2025-01-22 ASSESSMENT — EXTERNAL EXAM - RIGHT EYE: OD_EXAM: NORMAL

## 2025-01-22 NOTE — PROGRESS NOTES
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  10 H44.40 Hypotony of right eye-Improving  11 H35.352 Cystoid macular edema, left eye-Improving          DIAGNOSTIC PROCEDURE DONE  OCT DONE OD/OS  REASON FOR TEST: will help address and tailor  therapy by detecting subclinical CME SRF     Hi quality OCT  scans obtained  signal good    OCT OD - epiretinal membrane (ERM), abnormal Foveal Contour, inner retinal ischemic atrophy No Edema, IS/OS Junction focally attenuated subfovealy  OCT OS - Normal Foveal Contour, No Edema, IS/OS Junction Normal    additional commnents:      TODAY   (OU)   - OCT Macula By:Andres Chapa  Neovascular Glaucoma, OD  Proliferative diabetic retinopathy, OD  - Likely secondary to Ocular Ischemic Syndrome s/p Recent Carotid Endarterectomy due to 90% occlusion and DM  - s/p PPV EL FAX RANDA ([place] 10/9/20)  - Florid NVI on initial visit, IOP 56. Following CPC diode laser Kal[place]/Mona, IOP today 2.  - Retina attached all 4 quadrants, with good PRP laser, shallow choroidals peripherally s/p CPC diode laser  - Stop alphagan and cosopt today in setting of hypotony  - Continue atropine BID and prednisolone QID   left eye DOES NOT HAVE PDR      #VMT with cystoid macular edema (CME) OS  VMT is stable, patient' s vision is stable 20/25 and he is denying metamorphosia  R/b of pars plana vitrectomy (PPV) os were discussed, will defer surgery for now.         Left eye  has a PCO that is interfering w  retina care       today there is an element of VMT OS    consider PPV also OS if warranted  since VA OS is good    4m f/u

## 2025-01-28 DIAGNOSIS — Z79.4 TYPE 2 DIABETES MELLITUS WITH DIABETIC PERIPHERAL ANGIOPATHY WITHOUT GANGRENE, WITH LONG-TERM CURRENT USE OF INSULIN (MULTI): ICD-10-CM

## 2025-01-28 DIAGNOSIS — E11.51 TYPE 2 DIABETES MELLITUS WITH DIABETIC PERIPHERAL ANGIOPATHY WITHOUT GANGRENE, WITH LONG-TERM CURRENT USE OF INSULIN (MULTI): ICD-10-CM

## 2025-01-28 RX ORDER — BLOOD SUGAR DIAGNOSTIC
STRIP MISCELLANEOUS
Qty: 250 EACH | Refills: 0 | Status: SHIPPED | OUTPATIENT
Start: 2025-01-28

## 2025-01-30 ENCOUNTER — PATIENT OUTREACH (OUTPATIENT)
Dept: CARE COORDINATION | Facility: CLINIC | Age: 75
End: 2025-01-30
Payer: MEDICARE

## 2025-01-30 NOTE — PROGRESS NOTES
Care Management:  Attempted outreach to assess for any needs or questions   Left a voice mail with my contact information.   Will continue to follow.  Diana DAWKINS, RN, [facility] Organization  O: [phone]

## 2025-02-06 DIAGNOSIS — Z79.4 TYPE 2 DIABETES MELLITUS WITH DIABETIC PERIPHERAL ANGIOPATHY WITHOUT GANGRENE, WITH LONG-TERM CURRENT USE OF INSULIN (MULTI): ICD-10-CM

## 2025-02-06 DIAGNOSIS — E78.2 MIXED HYPERLIPIDEMIA: ICD-10-CM

## 2025-02-06 DIAGNOSIS — E11.51 TYPE 2 DIABETES MELLITUS WITH DIABETIC PERIPHERAL ANGIOPATHY WITHOUT GANGRENE, WITH LONG-TERM CURRENT USE OF INSULIN (MULTI): ICD-10-CM

## 2025-02-06 RX ORDER — ATORVASTATIN CALCIUM 80 MG/1
80 TABLET, FILM COATED ORAL NIGHTLY
Qty: 90 TABLET | Refills: 3 | Status: SHIPPED | OUTPATIENT
Start: 2025-02-06

## 2025-02-06 RX ORDER — CLOPIDOGREL BISULFATE 75 MG/1
75 TABLET ORAL DAILY
Qty: 90 TABLET | Refills: 3 | Status: SHIPPED | OUTPATIENT
Start: 2025-02-06

## 2025-02-07 ENCOUNTER — PATIENT OUTREACH (OUTPATIENT)
Dept: PRIMARY CARE | Facility: CLINIC | Age: 75
End: 2025-02-07
Payer: MEDICARE

## 2025-02-11 ENCOUNTER — APPOINTMENT (OUTPATIENT)
Dept: NEUROLOGY | Facility: CLINIC | Age: 75
End: 2025-02-11
Payer: MEDICARE

## 2025-02-17 DIAGNOSIS — E11.39 NEOVASCULAR GLAUCOMA DUE TO TYPE 2 DIABETES MELLITUS (MULTI): ICD-10-CM

## 2025-02-17 DIAGNOSIS — H42 NEOVASCULAR GLAUCOMA DUE TO TYPE 2 DIABETES MELLITUS (MULTI): ICD-10-CM

## 2025-02-17 RX ORDER — BIMATOPROST 0.1 MG/ML
SOLUTION/ DROPS OPHTHALMIC
Qty: 3 ML | Refills: 0 | Status: SHIPPED | OUTPATIENT
Start: 2025-02-17

## 2025-02-17 RX ORDER — PREDNISOLONE ACETATE 10 MG/ML
SUSPENSION/ DROPS OPHTHALMIC
Qty: 10 ML | Refills: 0 | Status: SHIPPED | OUTPATIENT
Start: 2025-02-17

## 2025-02-21 ENCOUNTER — TELEPHONE (OUTPATIENT)
Dept: CARDIOLOGY | Facility: CLINIC | Age: 75
End: 2025-02-21
Payer: MEDICARE

## 2025-02-21 NOTE — TELEPHONE ENCOUNTER
I have attempted to contact Mr. Mcfarland.  There is no answer at the following phone number [phone]    . I have left a voice mail message for the patient to contact Dr. Tomlinson office at [phone] to schedule a follow up appointment.     Dr. Tomlinson will not be in the office 3/20/2025

## 2025-02-24 DIAGNOSIS — J44.9 CHRONIC OBSTRUCTIVE PULMONARY DISEASE, UNSPECIFIED COPD TYPE (MULTI): ICD-10-CM

## 2025-02-24 RX ORDER — IPRATROPIUM BROMIDE AND ALBUTEROL SULFATE 2.5; .5 MG/3ML; MG/3ML
SOLUTION RESPIRATORY (INHALATION)
Qty: 180 ML | Refills: 3 | Status: SHIPPED | OUTPATIENT
Start: 2025-02-24

## 2025-02-28 ENCOUNTER — PATIENT OUTREACH (OUTPATIENT)
Dept: CARE COORDINATION | Facility: CLINIC | Age: 75
End: 2025-02-28
Payer: MEDICARE

## 2025-02-28 NOTE — PROGRESS NOTES
Care Management:  Patient stated he is doing well, but he feels a little short of breath.  His wife has cancer again, and now is getting treatment.  He will be placing a call to his doctor to attempt to get a head of his shortness of breath.  Will continue to follow.  Diana DAWKINS, RN, [facility]  Accountable Care Organization  O: [phone]

## 2025-03-06 ENCOUNTER — TELEPHONE (OUTPATIENT)
Dept: PRIMARY CARE | Facility: CLINIC | Age: 75
End: 2025-03-06
Payer: MEDICARE

## 2025-03-06 DIAGNOSIS — J44.0 CHRONIC OBSTRUCTIVE PULMONARY DISEASE WITH ACUTE LOWER RESPIRATORY INFECTION (MULTI): Primary | ICD-10-CM

## 2025-03-06 RX ORDER — LEVOFLOXACIN 500 MG/1
500 TABLET, FILM COATED ORAL DAILY
Qty: 7 TABLET | Refills: 0 | Status: SHIPPED | OUTPATIENT
Start: 2025-03-06 | End: 2025-03-13

## 2025-03-06 NOTE — TELEPHONE ENCOUNTER
Chest congestion, cough, feels like pneumonia trying to settle in he says, says you usually give him antibiotic before he gets worse..    Dedra hanna

## 2025-03-14 ENCOUNTER — HOSPITAL ENCOUNTER (OUTPATIENT)
Dept: RADIOLOGY | Facility: HOSPITAL | Age: 75
Discharge: HOME | End: 2025-03-14
Payer: MEDICARE

## 2025-03-14 DIAGNOSIS — I65.23 CAROTID STENOSIS, ASYMPTOMATIC, BILATERAL: ICD-10-CM

## 2025-03-14 PROCEDURE — 93880 EXTRACRANIAL BILAT STUDY: CPT

## 2025-03-20 ENCOUNTER — APPOINTMENT (OUTPATIENT)
Dept: VASCULAR SURGERY | Facility: CLINIC | Age: 75
End: 2025-03-20
Payer: MEDICARE

## 2025-03-27 ENCOUNTER — TELEMEDICINE (OUTPATIENT)
Dept: VASCULAR SURGERY | Facility: CLINIC | Age: 75
End: 2025-03-27
Payer: MEDICARE

## 2025-03-27 DIAGNOSIS — I65.21 STENOSIS OF RIGHT CAROTID ARTERY: Primary | ICD-10-CM

## 2025-03-27 DIAGNOSIS — Z98.890 H/O CAROTID ENDARTERECTOMY: ICD-10-CM

## 2025-03-27 DIAGNOSIS — I65.23 CAROTID STENOSIS, ASYMPTOMATIC, BILATERAL: ICD-10-CM

## 2025-03-27 PROCEDURE — 3048F LDL-C <100 MG/DL: CPT | Performed by: STUDENT IN AN ORGANIZED HEALTH CARE EDUCATION/TRAINING PROGRAM

## 2025-03-27 PROCEDURE — 4010F ACE/ARB THERAPY RXD/TAKEN: CPT | Performed by: STUDENT IN AN ORGANIZED HEALTH CARE EDUCATION/TRAINING PROGRAM

## 2025-03-27 PROCEDURE — 1157F ADVNC CARE PLAN IN RCRD: CPT | Performed by: STUDENT IN AN ORGANIZED HEALTH CARE EDUCATION/TRAINING PROGRAM

## 2025-03-27 PROCEDURE — 3052F HG A1C>EQUAL 8.0%<EQUAL 9.0%: CPT | Performed by: STUDENT IN AN ORGANIZED HEALTH CARE EDUCATION/TRAINING PROGRAM

## 2025-03-27 PROCEDURE — 99213 OFFICE O/P EST LOW 20 MIN: CPT | Performed by: STUDENT IN AN ORGANIZED HEALTH CARE EDUCATION/TRAINING PROGRAM

## 2025-03-27 PROCEDURE — 1123F ACP DISCUSS/DSCN MKR DOCD: CPT | Performed by: STUDENT IN AN ORGANIZED HEALTH CARE EDUCATION/TRAINING PROGRAM

## 2025-03-27 NOTE — PROGRESS NOTES
Vascular Surgery Clinic Note    CC: carotid    HPI:  Haile Mcfarland is a 74 y.o. male with history of R TIA and subsequent R CEA in 2021 by Dr. Mtz. Doing well, no recent stroke or TIA. 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    On statin and plavix. I review his duplex which shows no evidence of carotid stenosis in bilateral ICA     Telephone Consent  Due to technical difficulties, the patient was unable or unwilling to consent to connect via video telehealth technology; therefore, I performed this visit using a audio only connection.   Verbal consent was requested and obtained from Haile Mcfarland on this date, 3/27/2025 for a telehealth visit and the patient's location was confirmed at the time of the visit.     Medical History:   has a past medical history of Diabetes mellitus (Multi).    Meds:   Current Outpatient Medications on File Prior to Visit   Medication Sig Dispense Refill    albuterol (Ventolin HFA) 90 mcg/actuation inhaler Inhale 2 puffs every 6 hours if needed for wheezing or shortness of breath. 18 g 11    albuterol 2.5 mg /3 mL (0.083 %) nebulizer solution Take 3 mL (2.5 mg) by nebulization every 6 hours if needed for wheezing. 360 mL 11    amLODIPine (Norvasc) 10 mg tablet Take 1 tablet (10 mg) by mouth once daily in the evening. 90 tablet 3    aspirin 81 mg EC tablet Take 1 tablet (81 mg) by mouth once daily.      atorvastatin (Lipitor) 80 mg tablet TAKE 1 TABLET BY MOUTH ONCE DAILY AT BEDTIME 90 tablet 3    clopidogrel (Plavix) 75 mg tablet Take 1 tablet by mouth once daily 90 tablet 3    Contour Next Test Strips strip USE 1 STRIP TO CHECK GLUCOSE THREE TIMES DAILY 250 each 0    fluticasone-umeclidin-vilanter (Trelegy Ellipta) 100-62.5-25 mcg blister with device Inhale 1 puff once daily. Rinse mouth with water after use to reduce aftertaste and incidence of candidiasis. Do not swallow. 1 each 6    guaiFENesin (Mucinex) 600 mg 12 hr tablet Take 1 tablet (600 mg)  "by mouth 2 times a day. Do not crush, chew, or split. 20 tablet 0    hydroCHLOROthiazide (HYDRODiuril) 25 mg tablet TAKE 1 TABLET BY MOUTH ONCE DAILY IN THE MORNING 90 tablet 3    insulin glargine (Toujeo SoloStar U-300 Insulin) 300 unit/mL (1.5 mL) injection INJECT 50 UNITS SUBCUTANEOUSLY TWICE DAILY 36 mL 3    ipratropium-albuteroL (Duo-Neb) 0.5-2.5 mg/3 mL nebulizer solution USE 1 AMPULE IN NEBULIZER EVERY 8 HOURS AS NEEDED FOR WHEEZING FOR SHORTNESS OF BREATH 180 mL 3    ketorolac (Acular) 0.4 % ophthalmic solution Administer 1 drop into the left eye once daily at bedtime.      losartan (Cozaar) 50 mg tablet Take 1 tablet (50 mg) by mouth once daily. 90 tablet 1    Lumigan 0.01 % ophthalmic solution INSTILL 1 DROP INTO RIGHT EYE ONCE DAILY AT BEDTIME 3 mL 0    magnesium oxide (Mag-Ox) 400 mg (241.3 mg magnesium) tablet Take 1 tablet (400 mg) by mouth once daily.      metFORMIN (Glucophage) 1,000 mg tablet Take 0.5 tablets (500 mg) by mouth 2 times daily (morning and late afternoon). 90 tablet 3    metoprolol tartrate (Lopressor) 25 mg tablet Take 1 tablet (25 mg) by mouth 2 times a day. 60 tablet 5    nitroglycerin (Nitrostat) 0.4 mg SL tablet DISSOLVE ONE TABLET UNDER THE TONGUE EVERY 5 MINUTES AS NEEDED FOR CHEST PAIN.  DO NOT EXCEED A TOTAL OF 3 DOSES IN 15 MINUTES 25 tablet 0    NovoLOG FlexPen U-100 Insulin 100 unit/mL (3 mL) pen Inject under the skin 3 times a day before meals. Per Sliding Scale      pen needle, diabetic 32 gauge x 5/32\" needle USE 1 NEEDLE 3 TO 4 TIMES DAILY AS NEEDED 300 each 0    prednisoLONE acetate (Pred-Forte) 1 % ophthalmic suspension INSTILL 1 DROP INTO LEFT EYE TWICE DAILY 10 mL 0    primidone (Mysoline) 50 mg tablet 25 mg at bedtime for a week, then 50 mg at bedtime for a week, then keep raising by 25 mg each night until goal dose of 200 mg at bedtime 120 tablet 5     No current facility-administered medications on file prior to visit.        Allergies:   Allergies   Allergen " Reactions    Penicillins Other and Unknown     From childhood. Unsure of reaction.       SH:    Social Drivers of Health     Tobacco Use: Low Risk  (1/8/2025)    Patient History     Smoking Tobacco Use: Never     Smokeless Tobacco Use: Never     Passive Exposure: Not on file   Alcohol Use: Not At Risk (11/20/2024)    AUDIT-C     Frequency of Alcohol Consumption: Never     Average Number of Drinks: Patient does not drink     Frequency of Binge Drinking: Never   Financial Resource Strain: Low Risk  (11/20/2024)    Overall Financial Resource Strain (CARDIA)     Difficulty of Paying Living Expenses: Not very hard   Food Insecurity: No Food Insecurity (11/20/2024)    Hunger Vital Sign     Worried About Running Out of Food in the Last Year: Never true     Ran Out of Food in the Last Year: Never true   Transportation Needs: No Transportation Needs (11/20/2024)    PRAPARE - Transportation     Lack of Transportation (Medical): No     Lack of Transportation (Non-Medical): No   Physical Activity: Inactive (9/2/2024)    Exercise Vital Sign     Days of Exercise per Week: 0 days     Minutes of Exercise per Session: 0 min   Stress: No Stress Concern Present (9/2/2024)    [employer] - Occupational Stress Questionnaire     Feeling of Stress : Only a little   Social Connections: Unknown (9/2/2024)    Social Connection and Isolation Panel [NHANES]     Frequency of Communication with Friends and Family: Not on file     Frequency of Social Gatherings with Friends and Family: Not on file     Attends Religion Services: Not on file     Active Member of Clubs or Organizations: Not on file     Attends Club or Organization Meetings: Not on file     Marital Status:    Intimate Partner Violence: Not At Risk (11/20/2024)    Humiliation, Afraid, Rape, and Kick questionnaire     Fear of Current or Ex-Partner: No     Emotionally Abused: No     Physically Abused: No     Sexually Abused: No   Depression: Not at  risk (1/17/2025)    PHQ-2     PHQ-2 Score: 0   Housing Stability: Low Risk  (11/20/2024)    Housing Stability Vital Sign     Unable to Pay for Housing in the Last Year: No     Number of Times Moved in the Last Year: 1     Homeless in the Last Year: No   Utilities: Not At Risk (11/20/2024)    [facility] Utilities     Threatened with loss of utilities: No   Digital Equity: Not on file   Health Literacy: Not on file        FH:  Family History   Problem Relation Name Age of Onset    Tremor Father      Tremor Brother          ROS:  All systems were reviewed and are negative except as per HPI.    Objective:  Vitals:  There were no vitals filed for this visit.     Assessment & Plan:  Haile Mcfarland is 74 y.o. male h/o R CEA 4 years ago. Doing well. RTC yearly with carotid duplex    Shante Garcia PA-C

## 2025-03-31 ENCOUNTER — PATIENT OUTREACH (OUTPATIENT)
Dept: CARE COORDINATION | Facility: CLINIC | Age: 75
End: 2025-03-31
Payer: MEDICARE

## 2025-03-31 NOTE — PROGRESS NOTES
Care Manager:   Reached out to Haile, he hasn't been feeling too well, pneumonia.  His wife is in chemo and radiation.  He stated he has a lot on his plate.  No needs or questions at this time  tigist PARDON, RN, [facility]  Accountable Care Organization  O: [phone]

## 2025-04-02 LAB — PSA SERPL-MCNC: 3.43 NG/ML

## 2025-04-03 LAB
EST. AVERAGE GLUCOSE BLD GHB EST-MCNC: 203 MG/DL
EST. AVERAGE GLUCOSE BLD GHB EST-SCNC: 11.2 MMOL/L
HBA1C MFR BLD: 8.7 % OF TOTAL HGB

## 2025-04-08 ENCOUNTER — APPOINTMENT (OUTPATIENT)
Dept: PRIMARY CARE | Facility: CLINIC | Age: 75
End: 2025-04-08
Payer: MEDICARE

## 2025-04-08 VITALS
SYSTOLIC BLOOD PRESSURE: 124 MMHG | TEMPERATURE: 96 F | HEART RATE: 76 BPM | HEIGHT: 71 IN | WEIGHT: 290 LBS | BODY MASS INDEX: 40.6 KG/M2 | DIASTOLIC BLOOD PRESSURE: 79 MMHG

## 2025-04-08 DIAGNOSIS — Z00.00 ROUTINE GENERAL MEDICAL EXAMINATION AT HEALTH CARE FACILITY: Primary | ICD-10-CM

## 2025-04-08 DIAGNOSIS — Z79.4 TYPE 2 DIABETES MELLITUS WITH OTHER CIRCULATORY COMPLICATION, WITH LONG-TERM CURRENT USE OF INSULIN: ICD-10-CM

## 2025-04-08 DIAGNOSIS — E66.01 MORBID OBESITY WITH BMI OF 40.0-44.9, ADULT (MULTI): ICD-10-CM

## 2025-04-08 DIAGNOSIS — E11.59 TYPE 2 DIABETES MELLITUS WITH OTHER CIRCULATORY COMPLICATION, WITH LONG-TERM CURRENT USE OF INSULIN: ICD-10-CM

## 2025-04-08 PROCEDURE — 3074F SYST BP LT 130 MM HG: CPT | Performed by: INTERNAL MEDICINE

## 2025-04-08 PROCEDURE — 3078F DIAST BP <80 MM HG: CPT | Performed by: INTERNAL MEDICINE

## 2025-04-08 PROCEDURE — 3052F HG A1C>EQUAL 8.0%<EQUAL 9.0%: CPT | Performed by: INTERNAL MEDICINE

## 2025-04-08 PROCEDURE — 3008F BODY MASS INDEX DOCD: CPT | Performed by: INTERNAL MEDICINE

## 2025-04-08 PROCEDURE — 3048F LDL-C <100 MG/DL: CPT | Performed by: INTERNAL MEDICINE

## 2025-04-08 PROCEDURE — G0439 PPPS, SUBSEQ VISIT: HCPCS | Performed by: INTERNAL MEDICINE

## 2025-04-08 PROCEDURE — 1159F MED LIST DOCD IN RCRD: CPT | Performed by: INTERNAL MEDICINE

## 2025-04-08 PROCEDURE — 1036F TOBACCO NON-USER: CPT | Performed by: INTERNAL MEDICINE

## 2025-04-08 PROCEDURE — 1123F ACP DISCUSS/DSCN MKR DOCD: CPT | Performed by: INTERNAL MEDICINE

## 2025-04-08 PROCEDURE — 1157F ADVNC CARE PLAN IN RCRD: CPT | Performed by: INTERNAL MEDICINE

## 2025-04-08 PROCEDURE — 4010F ACE/ARB THERAPY RXD/TAKEN: CPT | Performed by: INTERNAL MEDICINE

## 2025-04-08 PROCEDURE — 1170F FXNL STATUS ASSESSED: CPT | Performed by: INTERNAL MEDICINE

## 2025-04-08 ASSESSMENT — PATIENT HEALTH QUESTIONNAIRE - PHQ9
SUM OF ALL RESPONSES TO PHQ9 QUESTIONS 1 AND 2: 0
2. FEELING DOWN, DEPRESSED OR HOPELESS: NOT AT ALL
1. LITTLE INTEREST OR PLEASURE IN DOING THINGS: NOT AT ALL
2. FEELING DOWN, DEPRESSED OR HOPELESS: NOT AT ALL
SUM OF ALL RESPONSES TO PHQ9 QUESTIONS 1 AND 2: 0
1. LITTLE INTEREST OR PLEASURE IN DOING THINGS: NOT AT ALL

## 2025-04-08 ASSESSMENT — ACTIVITIES OF DAILY LIVING (ADL)
TOILETING: INDEPENDENT
DOING HOUSEWORK: INDEPENDENT
TAKING MEDICATION: INDEPENDENT
GROCERY SHOPPING: INDEPENDENT
FEEDING: INDEPENDENT
ADEQUATE_TO_COMPLETE_ADL: NO
TOILETING: INDEPENDENT
USING TRANSPORTATION: INDEPENDENT
BATHING: INDEPENDENT
NEEDS ASSISTANCE WITH FOOD: INDEPENDENT
JUDGMENT_ADEQUATE_SAFELY_COMPLETE_DAILY_ACTIVITIES: YES
STIL DRIVING: YES
HEARING - LEFT EAR: FUNCTIONAL
MANAGING FINANCES: INDEPENDENT
FEEDING YOURSELF: INDEPENDENT
EATING: INDEPENDENT
BATHING: INDEPENDENT
PATIENT'S MEMORY ADEQUATE TO SAFELY COMPLETE DAILY ACTIVITIES?: YES
USING TELEPHONE: INDEPENDENT
DRESSING YOURSELF: INDEPENDENT
GROOMING: INDEPENDENT
JUDGMENT_ADEQUATE_SAFELY_COMPLETE_DAILY_ACTIVITIES: YES
DRESSING: INDEPENDENT
ADEQUATE_TO_COMPLETE_ADL: NO
WALKS IN HOME: INDEPENDENT
PILL BOX USED: NO
HEARING - RIGHT EAR: FUNCTIONAL
PREPARING MEALS: INDEPENDENT

## 2025-04-08 ASSESSMENT — ENCOUNTER SYMPTOMS
TREMORS: 1
CARDIOVASCULAR NEGATIVE: 1
GASTROINTESTINAL NEGATIVE: 1
LOSS OF SENSATION IN FEET: 0
WEAKNESS: 0
MUSCULOSKELETAL NEGATIVE: 1
RESPIRATORY NEGATIVE: 1
DEPRESSION: 0
CONSTITUTIONAL NEGATIVE: 1
WOUND: 0
OCCASIONAL FEELINGS OF UNSTEADINESS: 1

## 2025-04-08 NOTE — PROGRESS NOTES
"Subjective   Reason for Visit: Haile Mcfarland is an 74 y.o. male here for a Medicare Wellness visit.     Past Medical, Surgical, and Family History reviewed and updated in chart.    Reviewed all medications by prescribing practitioner or clinical pharmacist (such as prescriptions, OTCs, herbal therapies and supplements) and documented in the medical record.    74-year-old male patient was seen for wellness exam, hemoglobin A1c was reviewed, aggressive lowering of hemoglobin A1c will cause hypoglycemia so I have not attempted more, he is on extremely high dose of insulin.  He has been under stress because his wife is going through some cancer treatment, he has not been in the hospital since November, he has a severe COPD, he has a poor lung defense mechanism, he gets a frequent bronchitis.  He has no chest pains, he has no angina, his vision is impaired but he is able to manage his daily activities including driving.  He is unable to lose weight and I know him for several years and his BMI is always 40 or more although he is a tall heavyset male patient.  Recent ophthalmic follow-up was reviewed, he is not taking Mysoline, he is not seen by neurology.  Other testing investigations were reviewed, PSA was reviewed.        Patient Care Team:  Mustapha Ozuna MD as PCP - General  Mustap[facility] Ozuna MD [facility]CP - [facility]ributed Provider  Alice Salas RN as Care Manager (Case Management)  Brain Valiente MD as Cardiologist (Cardiology)  YUAN Mcneal-CNP as Nurse Practitioner (Cardiology)  Mario Tatum MD as Cardiologist (Cardiology)     Review of Systems   Constitutional: Negative.    Respiratory: Negative.     Cardiovascular: Negative.    Gastrointestinal: Negative.    Musculoskeletal: Negative.    Skin:  Negative for wound.   Neurological:  Positive for tremors. Negative for weakness.       Objective   Vitals:  Temp 35.6 °C (96 °F) (Temporal)   Ht 1.791 m (5' 10.5\")   Wt 132 kg (290 lb) "   BMI 41.02 kg/m²       Physical Exam  Constitutional:       Appearance: Normal appearance. He is obese.   HENT:      Head: Normocephalic.      Nose: Nose normal.   Eyes:      Conjunctiva/sclera: Conjunctivae normal.   Cardiovascular:      Rate and Rhythm: Normal rate and regular rhythm.      Heart sounds: Normal heart sounds.   Pulmonary:      Effort: Pulmonary effort is normal.      Breath sounds: Normal breath sounds.   Abdominal:      General: Abdomen is flat.      Palpations: Abdomen is soft.   Musculoskeletal:         General: Normal range of motion.      Cervical back: Normal range of motion and neck supple.   Skin:     General: Skin is warm and dry.   Neurological:      General: No focal deficit present.      Mental Status: He is oriented to person, place, and time. Mental status is at baseline.   Psychiatric:         Mood and Affect: Mood normal.         Judgment: Judgment normal.       Assessment & Plan  Morbid obesity with BMI of 40.0-44.9, adult (Multi)         Routine general medical examination at health care facility    Orders:    1 Year Follow Up In Primary Care - Wellness Exam; Future  Wellness exam was completed, he is sustaining by himself, significant pulmonary condition and brittle diabetes and diabetic neuropathy and significant atherosclerotic vascular disease and carotid artery disease remains.  Clopidogrel to be continued, insulin dose will not be altered, ADLs and IADLs are intact, no fall, occasional spells of depression but not significant or progressive, no anhedonia, no cognitive impairment, he stays homebound, he has a limited activities, he does not use nasal oxygen on a regular basis, he has no strain to do much physical activities, dietary modification may not be possible always.  He sleeps about 5 to 6 hours.  He has up to date living will, except for RSV vaccine all other vaccines are up to date.  Carotid duplex were reviewed, no significant stenotic disease identified.  Complete  physical exam was done, peripheral pulsations felt well I felt it today, no bruit, heavy and protuberant abdomen, adiposity remains.  Continue same medications, daily activities are unchanged, cannot expect more, laboratories next time, follow-up in 3 months.

## 2025-04-21 DIAGNOSIS — H42 NEOVASCULAR GLAUCOMA DUE TO TYPE 2 DIABETES MELLITUS (MULTI): ICD-10-CM

## 2025-04-21 DIAGNOSIS — E11.39 NEOVASCULAR GLAUCOMA DUE TO TYPE 2 DIABETES MELLITUS (MULTI): ICD-10-CM

## 2025-04-21 RX ORDER — BIMATOPROST 0.1 MG/ML
SOLUTION/ DROPS OPHTHALMIC
Qty: 3 ML | Refills: 0 | Status: SHIPPED | OUTPATIENT
Start: 2025-04-21

## 2025-04-21 RX ORDER — BRINZOLAMIDE/BRIMONIDINE TARTRATE 10; 2 MG/ML; MG/ML
SUSPENSION/ DROPS OPHTHALMIC
Qty: 8 ML | Refills: 0 | Status: SHIPPED | OUTPATIENT
Start: 2025-04-21

## 2025-04-30 ENCOUNTER — PATIENT OUTREACH (OUTPATIENT)
Dept: CARE COORDINATION | Facility: CLINIC | Age: 75
End: 2025-04-30
Payer: MEDICARE

## 2025-04-30 NOTE — PROGRESS NOTES
Care Management:   Haile states he is doing ok.  He said he is coughing up white sputum and his pcp is aware and prescribed him z-pack.  He is not coughing.  Advised him if there are any signs of infection (and reviewed what the signs are with him) to call his doctor quickly.  He said he would, no further questions at this time  tigist DAWKINS, RN, [facility]  Accountable Care Organization  O: [phone]

## 2025-05-19 DIAGNOSIS — J44.9 OBSTRUCTIVE AIRWAY DISEASE (MULTI): ICD-10-CM

## 2025-05-20 ENCOUNTER — APPOINTMENT (OUTPATIENT)
Dept: OPHTHALMOLOGY | Facility: CLINIC | Age: 75
End: 2025-05-20
Payer: MEDICARE

## 2025-05-20 DIAGNOSIS — H34.8120 CENTRAL RETINAL VEIN OCCLUSION WITH MACULAR EDEMA OF LEFT EYE: Primary | ICD-10-CM

## 2025-05-20 DIAGNOSIS — H42 NEOVASCULAR GLAUCOMA DUE TO TYPE 2 DIABETES MELLITUS (MULTI): ICD-10-CM

## 2025-05-20 DIAGNOSIS — H43.822 VITREOMACULAR TRACTION SYNDROME OF LEFT EYE: ICD-10-CM

## 2025-05-20 DIAGNOSIS — H35.82 OCULAR ISCHEMIC SYNDROME: ICD-10-CM

## 2025-05-20 DIAGNOSIS — E11.39 NEOVASCULAR GLAUCOMA DUE TO TYPE 2 DIABETES MELLITUS (MULTI): ICD-10-CM

## 2025-05-20 DIAGNOSIS — E11.3511 PROLIFERATIVE DIABETIC RETINOPATHY OF RIGHT EYE WITH MACULAR EDEMA ASSOCIATED WITH TYPE 2 DIABETES MELLITUS: ICD-10-CM

## 2025-05-20 PROCEDURE — 99213 OFFICE O/P EST LOW 20 MIN: CPT | Performed by: STUDENT IN AN ORGANIZED HEALTH CARE EDUCATION/TRAINING PROGRAM

## 2025-05-20 PROCEDURE — 92134 CPTRZ OPH DX IMG PST SGM RTA: CPT | Mod: BILATERAL PROCEDURE | Performed by: STUDENT IN AN ORGANIZED HEALTH CARE EDUCATION/TRAINING PROGRAM

## 2025-05-20 RX ORDER — KETOROLAC TROMETHAMINE 4 MG/ML
1 SOLUTION/ DROPS OPHTHALMIC 4 TIMES DAILY
Qty: 5 ML | Refills: 2 | Status: SHIPPED | OUTPATIENT
Start: 2025-05-20

## 2025-05-20 RX ORDER — FLUTICASONE FUROATE, UMECLIDINIUM BROMIDE AND VILANTEROL TRIFENATATE 100; 62.5; 25 UG/1; UG/1; UG/1
1 POWDER RESPIRATORY (INHALATION) DAILY
Qty: 60 EACH | Refills: 6 | Status: SHIPPED | OUTPATIENT
Start: 2025-05-20

## 2025-05-20 ASSESSMENT — SLIT LAMP EXAM - LIDS
COMMENTS: NORMAL
COMMENTS: NORMAL

## 2025-05-20 ASSESSMENT — ENCOUNTER SYMPTOMS
GASTROINTESTINAL NEGATIVE: 0
CONSTITUTIONAL NEGATIVE: 0
ENDOCRINE NEGATIVE: 0
ALLERGIC/IMMUNOLOGIC NEGATIVE: 0
HEMATOLOGIC/LYMPHATIC NEGATIVE: 0
NEUROLOGICAL NEGATIVE: 0
EYES NEGATIVE: 1
PSYCHIATRIC NEGATIVE: 0
CARDIOVASCULAR NEGATIVE: 0
MUSCULOSKELETAL NEGATIVE: 0
RESPIRATORY NEGATIVE: 0

## 2025-05-20 ASSESSMENT — CUP TO DISC RATIO
OS_RATIO: 0.3
OD_RATIO: 0.5

## 2025-05-20 ASSESSMENT — VISUAL ACUITY
OD_CC: 20/CF@2FT
OS_CC: 20/25
OS_CC+: -2
CORRECTION_TYPE: GLASSES
METHOD: SNELLEN - LINEAR

## 2025-05-20 ASSESSMENT — TONOMETRY
OD_IOP_MMHG: 12
OS_IOP_MMHG: 16
IOP_METHOD: GOLDMANN APPLANATION

## 2025-05-20 ASSESSMENT — PACHYMETRY
OD_CT(UM): 700
OS_CT(UM): 656

## 2025-05-20 ASSESSMENT — EXTERNAL EXAM - LEFT EYE: OS_EXAM: NORMAL

## 2025-05-20 ASSESSMENT — EXTERNAL EXAM - RIGHT EYE: OD_EXAM: NORMAL

## 2025-05-20 NOTE — PROGRESS NOTES
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)  Ocular ischemic syndrome    Last Injection: N/a   Surgery: [facility] EL FAX RANDA OD ([place] 10/9/20), s/p [facility] OD ([place])    OCT-Mac 05/20/25  OD - epiretinal membrane (ERM), abnormal Foveal Contour, inner retinal ischemic atrophy No Edema, IS/OS Junction focally attenuated subfovealy  OS - VMT , AbNormal Foveal Contour, Edema, IS/OS Junction Normal    additional commnents: interval worsening VMT with Edema OS         Neovascular Glaucoma, OD  Ocular Ischemic Syndrome OD  Proliferative diabetic retinopathy, OD    - patient reports he lost vision after intravitreal injection at [facility]  - Likely secondary to Ocular Ischemic Syndrome s/p Recent Carotid Endarterectomy due to 90% occlusion and DM  - Continue atropine BID and prednisolone QID  - no evidence of new neovascularization or josh  - monitor      #VMT with cystoid macular edema (CME) OS  VMT is progressing , patient' s vision is stable 20/25 and he is denying metamorphosia   - restart ketorolac QID, continue pred BID  R/b of pars plana vitrectomy (PPV) os were discussed, will defer surgery for now but if VMT continues to worsen and/or vision declines then may need to consider surgical intervention    RTC 1 month Dr. Chapa    4m f/u

## 2025-05-28 ENCOUNTER — APPOINTMENT (OUTPATIENT)
Dept: OPHTHALMOLOGY | Facility: CLINIC | Age: 75
End: 2025-05-28
Payer: MEDICARE

## 2025-05-28 DIAGNOSIS — I25.10 CAD S/P PERCUTANEOUS CORONARY ANGIOPLASTY: ICD-10-CM

## 2025-05-28 DIAGNOSIS — I10 ESSENTIAL HYPERTENSION, BENIGN: ICD-10-CM

## 2025-05-28 DIAGNOSIS — Z98.61 CAD S/P PERCUTANEOUS CORONARY ANGIOPLASTY: ICD-10-CM

## 2025-05-28 RX ORDER — LOSARTAN POTASSIUM 50 MG/1
50 TABLET ORAL DAILY
Qty: 90 TABLET | Refills: 0 | Status: SHIPPED | OUTPATIENT
Start: 2025-05-28

## 2025-05-28 RX ORDER — METOPROLOL TARTRATE 25 MG/1
25 TABLET, FILM COATED ORAL 2 TIMES DAILY
Qty: 60 TABLET | Refills: 2 | Status: SHIPPED | OUTPATIENT
Start: 2025-05-28

## 2025-05-29 ENCOUNTER — PATIENT OUTREACH (OUTPATIENT)
Dept: CARE COORDINATION | Facility: CLINIC | Age: 75
End: 2025-05-29
Payer: MEDICARE

## 2025-05-29 NOTE — PROGRESS NOTES
Care Manager:  Attempted outreach, left a vm  alecia    Alice PARDON, RN, [facility] Care Organization  O: [phone]

## 2025-05-31 ENCOUNTER — HOSPITAL ENCOUNTER (EMERGENCY)
Facility: HOSPITAL | Age: 75
Discharge: HOME | End: 2025-05-31
Attending: EMERGENCY MEDICINE
Payer: MEDICARE

## 2025-05-31 ENCOUNTER — APPOINTMENT (OUTPATIENT)
Dept: RADIOLOGY | Facility: HOSPITAL | Age: 75
End: 2025-05-31
Payer: MEDICARE

## 2025-05-31 ENCOUNTER — APPOINTMENT (OUTPATIENT)
Dept: CARDIOLOGY | Facility: HOSPITAL | Age: 75
End: 2025-05-31
Payer: MEDICARE

## 2025-05-31 VITALS
BODY MASS INDEX: 35.43 KG/M2 | DIASTOLIC BLOOD PRESSURE: 70 MMHG | TEMPERATURE: 98.8 F | OXYGEN SATURATION: 97 % | HEIGHT: 75 IN | WEIGHT: 285 LBS | HEART RATE: 72 BPM | RESPIRATION RATE: 16 BRPM | SYSTOLIC BLOOD PRESSURE: 147 MMHG

## 2025-05-31 DIAGNOSIS — J44.1 COPD EXACERBATION (MULTI): ICD-10-CM

## 2025-05-31 DIAGNOSIS — J18.9 PNEUMONIA OF RIGHT LOWER LOBE DUE TO INFECTIOUS ORGANISM: Primary | ICD-10-CM

## 2025-05-31 DIAGNOSIS — R79.89 ELEVATED TROPONIN: ICD-10-CM

## 2025-05-31 DIAGNOSIS — D64.9 ANEMIA, UNSPECIFIED TYPE: ICD-10-CM

## 2025-05-31 DIAGNOSIS — D72.829 LEUKOCYTOSIS, UNSPECIFIED TYPE: ICD-10-CM

## 2025-05-31 LAB
ALBUMIN SERPL BCP-MCNC: 3.9 G/DL (ref 3.4–5)
ALP SERPL-CCNC: 112 U/L (ref 33–136)
ALT SERPL W P-5'-P-CCNC: 18 U/L (ref 10–52)
ANION GAP SERPL CALC-SCNC: 12 MMOL/L (ref 10–20)
AST SERPL W P-5'-P-CCNC: 13 U/L (ref 9–39)
BASOPHILS # BLD AUTO: 0.07 X10*3/UL (ref 0–0.1)
BASOPHILS NFR BLD AUTO: 0.3 %
BILIRUB SERPL-MCNC: 1.1 MG/DL (ref 0–1.2)
BNP SERPL-MCNC: 146 PG/ML (ref 0–99)
BUN SERPL-MCNC: 36 MG/DL (ref 6–23)
CALCIUM SERPL-MCNC: 9.1 MG/DL (ref 8.6–10.3)
CARDIAC TROPONIN I PNL SERPL HS: 30 NG/L (ref 0–20)
CARDIAC TROPONIN I PNL SERPL HS: 33 NG/L (ref 0–20)
CHLORIDE SERPL-SCNC: 104 MMOL/L (ref 98–107)
CO2 SERPL-SCNC: 25 MMOL/L (ref 21–32)
CREAT SERPL-MCNC: 1.23 MG/DL (ref 0.5–1.3)
EGFRCR SERPLBLD CKD-EPI 2021: 62 ML/MIN/1.73M*2
EOSINOPHIL # BLD AUTO: 0.29 X10*3/UL (ref 0–0.4)
EOSINOPHIL NFR BLD AUTO: 1.4 %
ERYTHROCYTE [DISTWIDTH] IN BLOOD BY AUTOMATED COUNT: 15.9 % (ref 11.5–14.5)
FLUAV RNA RESP QL NAA+PROBE: NOT DETECTED
FLUBV RNA RESP QL NAA+PROBE: NOT DETECTED
GLUCOSE SERPL-MCNC: 91 MG/DL (ref 74–99)
HCT VFR BLD AUTO: 36.1 % (ref 41–52)
HGB BLD-MCNC: 12.1 G/DL (ref 13.5–17.5)
IMM GRANULOCYTES # BLD AUTO: 0.15 X10*3/UL (ref 0–0.5)
IMM GRANULOCYTES NFR BLD AUTO: 0.7 % (ref 0–0.9)
INR PPP: 1.1 (ref 0.9–1.1)
LACTATE SERPL-SCNC: 1.2 MMOL/L (ref 0.4–2)
LACTATE SERPL-SCNC: 2.3 MMOL/L (ref 0.4–2)
LYMPHOCYTES # BLD AUTO: 1.19 X10*3/UL (ref 0.8–3)
LYMPHOCYTES NFR BLD AUTO: 5.8 %
MAGNESIUM SERPL-MCNC: 1.77 MG/DL (ref 1.6–2.4)
MCH RBC QN AUTO: 28.5 PG (ref 26–34)
MCHC RBC AUTO-ENTMCNC: 33.5 G/DL (ref 32–36)
MCV RBC AUTO: 85 FL (ref 80–100)
MONOCYTES # BLD AUTO: 1.93 X10*3/UL (ref 0.05–0.8)
MONOCYTES NFR BLD AUTO: 9.4 %
NEUTROPHILS # BLD AUTO: 17.01 X10*3/UL (ref 1.6–5.5)
NEUTROPHILS NFR BLD AUTO: 82.4 %
NRBC BLD-RTO: 0 /100 WBCS (ref 0–0)
PLATELET # BLD AUTO: 328 X10*3/UL (ref 150–450)
POTASSIUM SERPL-SCNC: 3.9 MMOL/L (ref 3.5–5.3)
PROT SERPL-MCNC: 6.8 G/DL (ref 6.4–8.2)
PROTHROMBIN TIME: 12.7 SECONDS (ref 9.8–12.4)
RBC # BLD AUTO: 4.24 X10*6/UL (ref 4.5–5.9)
RSV RNA RESP QL NAA+PROBE: NOT DETECTED
SARS-COV-2 RNA RESP QL NAA+PROBE: NOT DETECTED
SODIUM SERPL-SCNC: 137 MMOL/L (ref 136–145)
WBC # BLD AUTO: 20.6 X10*3/UL (ref 4.4–11.3)

## 2025-05-31 PROCEDURE — 36415 COLL VENOUS BLD VENIPUNCTURE: CPT | Performed by: EMERGENCY MEDICINE

## 2025-05-31 PROCEDURE — 85025 COMPLETE CBC W/AUTO DIFF WBC: CPT | Performed by: EMERGENCY MEDICINE

## 2025-05-31 PROCEDURE — 83880 ASSAY OF NATRIURETIC PEPTIDE: CPT | Performed by: EMERGENCY MEDICINE

## 2025-05-31 PROCEDURE — 99285 EMERGENCY DEPT VISIT HI MDM: CPT | Performed by: EMERGENCY MEDICINE

## 2025-05-31 PROCEDURE — 71045 X-RAY EXAM CHEST 1 VIEW: CPT | Mod: FOREIGN READ | Performed by: RADIOLOGY

## 2025-05-31 PROCEDURE — 87637 SARSCOV2&INF A&B&RSV AMP PRB: CPT | Performed by: EMERGENCY MEDICINE

## 2025-05-31 PROCEDURE — 83605 ASSAY OF LACTIC ACID: CPT | Performed by: EMERGENCY MEDICINE

## 2025-05-31 PROCEDURE — 96367 TX/PROPH/DG ADDL SEQ IV INF: CPT

## 2025-05-31 PROCEDURE — 2500000004 HC RX 250 GENERAL PHARMACY W/ HCPCS (ALT 636 FOR OP/ED): Performed by: EMERGENCY MEDICINE

## 2025-05-31 PROCEDURE — 84484 ASSAY OF TROPONIN QUANT: CPT | Performed by: EMERGENCY MEDICINE

## 2025-05-31 PROCEDURE — 82247 BILIRUBIN TOTAL: CPT | Performed by: EMERGENCY MEDICINE

## 2025-05-31 PROCEDURE — 93005 ELECTROCARDIOGRAM TRACING: CPT

## 2025-05-31 PROCEDURE — 83735 ASSAY OF MAGNESIUM: CPT | Performed by: EMERGENCY MEDICINE

## 2025-05-31 PROCEDURE — 85610 PROTHROMBIN TIME: CPT | Performed by: EMERGENCY MEDICINE

## 2025-05-31 PROCEDURE — 96375 TX/PRO/DX INJ NEW DRUG ADDON: CPT

## 2025-05-31 PROCEDURE — 71045 X-RAY EXAM CHEST 1 VIEW: CPT

## 2025-05-31 PROCEDURE — 2500000002 HC RX 250 W HCPCS SELF ADMINISTERED DRUGS (ALT 637 FOR MEDICARE OP, ALT 636 FOR OP/ED): Mod: JZ | Performed by: EMERGENCY MEDICINE

## 2025-05-31 PROCEDURE — 96365 THER/PROPH/DIAG IV INF INIT: CPT

## 2025-05-31 PROCEDURE — 94640 AIRWAY INHALATION TREATMENT: CPT

## 2025-05-31 RX ORDER — IPRATROPIUM BROMIDE AND ALBUTEROL SULFATE 2.5; .5 MG/3ML; MG/3ML
3 SOLUTION RESPIRATORY (INHALATION) ONCE
Status: COMPLETED | OUTPATIENT
Start: 2025-05-31 | End: 2025-05-31

## 2025-05-31 RX ORDER — MAGNESIUM SULFATE HEPTAHYDRATE 40 MG/ML
2 INJECTION, SOLUTION INTRAVENOUS ONCE
Status: COMPLETED | OUTPATIENT
Start: 2025-05-31 | End: 2025-05-31

## 2025-05-31 RX ORDER — ALBUTEROL SULFATE 90 UG/1
1-2 INHALANT RESPIRATORY (INHALATION) EVERY 6 HOURS PRN
Qty: 18 G | Refills: 0 | Status: SHIPPED | OUTPATIENT
Start: 2025-05-31 | End: 2025-06-30

## 2025-05-31 RX ORDER — DOXYCYCLINE 100 MG/1
100 CAPSULE ORAL 2 TIMES DAILY
Qty: 20 CAPSULE | Refills: 0 | Status: SHIPPED | OUTPATIENT
Start: 2025-05-31 | End: 2025-06-10

## 2025-05-31 RX ORDER — PREDNISONE 20 MG/1
60 TABLET ORAL DAILY
Qty: 15 TABLET | Refills: 0 | Status: SHIPPED | OUTPATIENT
Start: 2025-05-31 | End: 2025-06-05

## 2025-05-31 RX ADMIN — METHYLPREDNISOLONE SODIUM SUCCINATE 125 MG: 125 INJECTION, POWDER, FOR SOLUTION INTRAMUSCULAR; INTRAVENOUS at 13:32

## 2025-05-31 RX ADMIN — IPRATROPIUM BROMIDE AND ALBUTEROL SULFATE 3 ML: .5; 3 SOLUTION RESPIRATORY (INHALATION) at 13:01

## 2025-05-31 RX ADMIN — DOXYCYCLINE 100 MG: 100 INJECTION, POWDER, LYOPHILIZED, FOR SOLUTION INTRAVENOUS at 14:03

## 2025-05-31 RX ADMIN — MAGNESIUM SULFATE HEPTAHYDRATE 2 G: 40 INJECTION, SOLUTION INTRAVENOUS at 13:31

## 2025-05-31 ASSESSMENT — PAIN SCALES - GENERAL
PAINLEVEL_OUTOF10: 0 - NO PAIN

## 2025-05-31 ASSESSMENT — LIFESTYLE VARIABLES
EVER HAD A DRINK FIRST THING IN THE MORNING TO STEADY YOUR NERVES TO GET RID OF A HANGOVER: NO
HAVE YOU EVER FELT YOU SHOULD CUT DOWN ON YOUR DRINKING: NO
EVER FELT BAD OR GUILTY ABOUT YOUR DRINKING: NO
TOTAL SCORE: 0
HAVE PEOPLE ANNOYED YOU BY CRITICIZING YOUR DRINKING: NO

## 2025-05-31 ASSESSMENT — PAIN - FUNCTIONAL ASSESSMENT: PAIN_FUNCTIONAL_ASSESSMENT: 0-10

## 2025-05-31 NOTE — ED PROVIDER NOTES
HPI   Chief Complaint   Patient presents with    Shortness of Breath     SOB and productive cough x  3 days. Pt reports he has had pneumonia every year the last 12 years.          History provided by:  Patient and EMS personnel    Chief Complaint   Patient presents with    Shortness of Breath     SOB and productive cough x  3 days. Pt reports he has had pneumonia every year the last 12 years.        History of Present Illness:  Haile Mcfarland is a 74 y.o. male presents with 1 day of shortness of breath with productive cough with yellow sputum.  Patient is concerned he may have pneumonia.  No fever or chills.  No chest pain.  No back or flank pain.  No nausea or vomiting.  No leg swelling.  No sick contacts.  No trauma or travel.  No treatment prior to arrival.      PMFSH:   As per HPI, otherwise nurses notes reviewed in EMR.    Past Medical History: Medical History[1]   Past Surgical History: Surgical History[2]   Family History: Family History[3]   Social History:  Social History[4]  Allergies: Allergies[5]  Current Outpatient Medications   Medication Instructions    albuterol (Ventolin HFA) 90 mcg/actuation inhaler 2 puffs, inhalation, Every 6 hours PRN    albuterol 90 mcg/actuation inhaler 1-2 puffs, inhalation, Every 6 hours PRN    amLODIPine (NORVASC) 10 mg, oral, Every evening    aspirin 81 mg EC tablet 1 tablet, Daily    atorvastatin (LIPITOR) 80 mg, oral, Nightly    clopidogrel (PLAVIX) 75 mg, oral, Daily    Contour Next Test Strips strip USE 1 STRIP TO CHECK GLUCOSE THREE TIMES DAILY    doxycycline (VIBRAMYCIN) 100 mg, oral, 2 times daily, Take with at least 8 ounces (large glass) of water, do not lie down for 30 minutes after    fluticasone-umeclidin-vilanter (Trelegy Ellipta) 100-62.5-25 mcg blister with device 1 puff, inhalation, Daily, Rinse mouth with water after use to reduce aftertaste and incidence of candidiasis. Do not swallow.    guaiFENesin (MUCINEX) 600 mg, oral, 2 times daily, Do not crush,  "chew, or split.    hydroCHLOROthiazide (HYDRODIURIL) 25 mg, oral, Daily before breakfast    insulin glargine (Toujeo SoloStar U-300 Insulin) 300 unit/mL (1.5 mL) injection INJECT 50 UNITS SUBCUTANEOUSLY TWICE DAILY    ipratropium-albuteroL (Duo-Neb) 0.5-2.5 mg/3 mL nebulizer solution USE 1 AMPULE IN NEBULIZER EVERY 8 HOURS AS NEEDED FOR WHEEZING FOR SHORTNESS OF BREATH    ketorolac (Acular) 0.4 % ophthalmic solution 1 drop, Left Eye, 4 times daily    losartan (COZAAR) 50 mg, oral, Daily    Lumigan 0.01 % ophthalmic solution INSTILL 1 DROP INTO RIGHT EYE ONCE DAILY AT BEDTIME    magnesium oxide (Mag-Ox) 400 mg (241.3 mg magnesium) tablet 1 tablet, Daily    metFORMIN (GLUCOPHAGE) 500 mg, oral, 2 times daily (morning and late afternoon)    metoprolol tartrate (LOPRESSOR) 25 mg, oral, 2 times daily    nitroglycerin (Nitrostat) 0.4 mg SL tablet DISSOLVE ONE TABLET UNDER THE TONGUE EVERY 5 MINUTES AS NEEDED FOR CHEST PAIN.  DO NOT EXCEED A TOTAL OF 3 DOSES IN 15 MINUTES    NovoLOG FlexPen U-100 Insulin 100 unit/mL (3 mL) pen Inject under the skin 3 times a day before meals. Per Sliding Scale    pen needle, diabetic 32 gauge x 5/32\" needle USE 1 NEEDLE 3 TO 4 TIMES DAILY AS NEEDED    prednisoLONE acetate (Pred-Forte) 1 % ophthalmic suspension INSTILL 1 DROP INTO LEFT EYE TWICE DAILY    predniSONE (DELTASONE) 60 mg, oral, Daily    primidone (Mysoline) 50 mg tablet 25 mg at bedtime for a week, then 50 mg at bedtime for a week, then keep raising by 25 mg each night until goal dose of 200 mg at bedtime    Simbrinza 1-0.2 % drops,suspension ophthalmic suspension INSTILL 1 DROP INTO RIGHT EYE TWICE DAILY              Patient History   Medical History[6]  Surgical History[7]  Family History[8]  Social History[9]    Physical Exam   ED Triage Vitals   Temp Pulse Resp BP   -- -- -- --      SpO2 Temp src Heart Rate Source Patient Position   -- -- -- --      BP Location FiO2 (%)     -- --       Physical Exam  Physical Exam:    ED " "Triage Vitals   Temp Pulse Resp BP   -- -- -- --      SpO2 Temp src Heart Rate Source Patient Position   -- -- -- --      BP Location FiO2 (%)     -- --         Patient Vitals for the past 24 hrs:   BP Temp Pulse Resp SpO2 Height Weight   05/31/25 1400 149/70 -- 72 18 95 % -- --   05/31/25 1301 -- -- -- -- 96 % -- --   05/31/25 1154 -- -- -- -- 96 % -- --   05/31/25 1145 150/68 37.1 °C (98.8 °F) 78 18 96 % 1.905 m (6' 3\") 129 kg (285 lb)       Constitutional: Vital signs per nursing notes.  Well developed, well nourished.  No acute distress.    Psychiatric: alert and oriented to person, place, and time; no abnormalities of mood or affect; memory intact  Eyes: PERRL; conjunctivae and lids normal; EOMI  ENT: otoscopic exam of external canal and TM´s normal; nasal mucosa, turbinates, and septum normal; mouth, tongue, and pharynx normal; pharynx without edema, exudate, or injection  Neck: neck supple, no meningismus; trachea midline without deviation; no lymphadenopathy; no thyromegaly; carotid pulses even bilaterally  Chest: no masses or tenderness, no discharge  Respiratory: normal respiratory effort and excursion; no rales, rhonchi, or wheezes; equal but diminished air entry  Cardiovascular: regular rate and rhythm; no murmurs, rubs or gallops; symmetric pulses; no edema; normal capillary refill; distal pulses present  Neurological: normal speech; CN II-XII grossly intact; normal motor and sensory function; no nystagmus; no pronator drift  GI: no masses, tenderness, rebound or guarding; no palpable, pulsatile mass; no organomegaly; no hernia; normal bowel sounds; (-) Wasserman´s sign; (-) McBurney´s sign; (-) CVA tenderness  Lymphatic: no adenopathy of neck  Musculoskeletal: normal gait and station; normal digits and nails; no gross tendon or ligament injury; normal to palpation; normal strength/tone; neurovascular status intact; (-) Rao´s sign; (-) straight leg raise; no palpable cords; calf circumference equal " bilaterally  Skin: normal to inspection; normal to palpation; no rash  GCS: 15      ED Course & MDM   Diagnoses as of 05/31/25 1511   Pneumonia of right lower lobe due to infectious organism   COPD exacerbation (Multi)   Elevated troponin   Leukocytosis, unspecified type   Anemia, unspecified type                 No data recorded     Yeyo Coma Scale Score: 15 (05/31/25 1154 : Rosy Lawrence RN)                           Medical Decision Making  Medical Decision Making:    EKG: My interpretation of EKG is normal sinus rhythm 88 bpm with nonspecific ST-T changes             My interpretation of second EKG is normal sinus rhythm at 81 bpm with nonspecific ST-T changes    Labs:   Labs Reviewed   CBC WITH AUTO DIFFERENTIAL - Abnormal       Result Value    WBC 20.6 (*)     nRBC 0.0      RBC 4.24 (*)     Hemoglobin 12.1 (*)     Hematocrit 36.1 (*)     MCV 85      MCH 28.5      MCHC 33.5      RDW 15.9 (*)     Platelets 328      Neutrophils % 82.4      Immature Granulocytes %, Automated 0.7      Lymphocytes % 5.8      Monocytes % 9.4      Eosinophils % 1.4      Basophils % 0.3      Neutrophils Absolute 17.01 (*)     Immature Granulocytes Absolute, Automated 0.15      Lymphocytes Absolute 1.19      Monocytes Absolute 1.93 (*)     Eosinophils Absolute 0.29      Basophils Absolute 0.07     COMPREHENSIVE METABOLIC PANEL - Abnormal    Glucose 91      Sodium 137      Potassium 3.9      Chloride 104      Bicarbonate 25      Anion Gap 12      Urea Nitrogen 36 (*)     Creatinine 1.23      eGFR 62      Calcium 9.1      Albumin 3.9      Alkaline Phosphatase 112      Total Protein 6.8      AST 13      Bilirubin, Total 1.1      ALT 18     B-TYPE NATRIURETIC PEPTIDE - Abnormal     (*)     Narrative:        <100 pg/mL - Heart failure unlikely  100-299 pg/mL - Intermediate probability of acute heart                  failure exacerbation. Correlate with clinical                  context and patient history.    >=300 pg/mL -  Heart Failure likely. Correlate with clinical                  context and patient history.    BNP testing is performed using different testing methodology at [facility] than at other [facility]. Direct result comparisons should only be made within the same method.      PROTIME-INR - Abnormal    Protime 12.7 (*)     INR 1.1     LACTATE - Abnormal    Lactate 2.3 (*)     Narrative:     Venipuncture immediately after or during the administration of Metamizole may lead to falsely low results. Testing should be performed immediately prior to Metamizole dosing.   SERIAL TROPONIN-INITIAL - Abnormal    Troponin I, High Sensitivity 33 (*)     Narrative:     Less than 99th percentile of normal range cutoff-  Female and children under 18 years old <14 ng/L; Male <21 ng/L: Negative  Repeat testing should be performed if clinically indicated.     Female and children under 18 years old 14-50 ng/L; Male 21-50 ng/L:  Consistent with possible cardiac damage and possible increased clinical   risk. Serial measurements may help to assess extent of myocardial damage.     >50 ng/L: Consistent with cardiac damage, increased clinical risk and  myocardial infarction. Serial measurements may help assess extent of   myocardial damage.      NOTE: Children less than 1 year old may have higher baseline troponin   levels and results should be interpreted in conjunction with the overall   clinical context.     NOTE: Troponin I testing is performed using a different   testing methodology at [facility] than at other   [facility]. Direct result comparisons should only   be made within the same method.   SERIAL TROPONIN, 1 HOUR - Abnormal    Troponin I, High Sensitivity 30 (*)     Narrative:     Less than 99th percentile of normal range cutoff-  Female and children under 18 years old <14 ng/L; Male <21 ng/L: Negative  Repeat testing should be performed if clinically indicated.     Female and children under 18  years old 14-50 ng/L; Male 21-50 ng/L:  Consistent with possible cardiac damage and possible increased clinical   risk. Serial measurements may help to assess extent of myocardial damage.     >50 ng/L: Consistent with cardiac damage, increased clinical risk and  myocardial infarction. Serial measurements may help assess extent of   myocardial damage.      NOTE: Children less than 1 year old may have higher baseline troponin   levels and results should be interpreted in conjunction with the overall   clinical context.     NOTE: Troponin I testing is performed using a different   testing methodology at [facility] than at [facility]. Direct result comparisons should only   be made within the same method.   MAGNESIUM - Normal    Magnesium 1.77     SARS-COV-2, INFLUENZA A/B AND RSV PCR - Normal    Coronavirus 2019, PCR Not Detected      Flu A Result Not Detected      Flu B Result Not Detected      RSV PCR Not Detected      Narrative:     This assay is an FDA-cleared, in vitro diagnostic nucleic acid amplification test for the qualitative detection and differentiation of SARS CoV-2/ Influenza A/B/ RSV from nasopharyngeal specimens collected from individuals with signs and symptoms of respiratory tract infections, and has been validated for use at [facility]. Negative results do not preclude COVID-19/ Influenza A/B/ RSV infections and should not be used as the sole basis for diagnosis, treatment, or other management decisions. Testing for SARS CoV-2 is recommended only for patients who meet current clinical and/or epidemiological criteria defined by federal, state, or local public health directives.   LACTATE - Normal    Lactate 1.2      Narrative:     Venipuncture immediately after or during the administration of Metamizole may lead to falsely low results. Testing should be performed immediately prior to Metamizole dosing.   TROPONIN SERIES- (INITIAL, 1 HR)    Narrative:      The following orders were created for panel order Troponin I Series, High Sensitivity (0, 1 HR).  Procedure                               Abnormality         Status                     ---------                               -----------         ------                     Troponin I, High Sensiti...[[id number]]  Abnormal            Final result               Troponin, High Sensitivi...[[id number]]  Abnormal            Final result                 Please view results for these tests on the individual orders.       Diagnostic Imaging:   XR chest 1 view   Final Result   Parenchymal opacities versus calcified pleural plaques are   demonstrated overlying the right hemidiaphragm.  Plate-like   atelectasis right lower lobe.  Please correlate for component of   pneumonia.   Signed by Humza Neal DO          ED Medication Administration:   Medications   ipratropium-albuteroL (Duo-Neb) 0.5-2.5 mg/3 mL nebulizer solution 3 mL (3 mL nebulization Given 5/31/25 1301)   methylPREDNISolone sod succinate (SOLU-Medrol) injection 125 mg (125 mg intravenous Given 5/31/25 1332)   magnesium sulfate 2 g in sterile water for injection 50 mL (0 g intravenous Stopped 5/31/25 1402)   doxycycline (Vibramycin) 100 mg in dextrose 5%  mL (0 mg intravenous Stopped 5/31/25 1505)       ED Course:     Haile Mcfarland is a 74 y.o. male presents with shortness of breath.    Differential Diagnoses Considered:  Pneumonia, viral syndrome, COPD exacerbation, ACS, arrhythmia    Patient presents with shortness of breath.    Chest x-ray to evaluate for evidence of pneumonia/pneumothorax/CHF/COPD.     EKG/troponin to evaluate for evidence of arrhythmia/ACS.    Lab work to evaluate for evidence of anemia or significant electrolyte abnormality including hypokalemia, hyperkalemia, hyponatremia, hypernatremia, hyperglycemia or hypoglycemia.     RSV/Influenza/COVID-19 swabs to evaluate for evidence of respective infections.    Lactic acid to evaluate  for possibility of sepsis.    Considered CT chest, but no CT chest indicated as I considered but do not suspect aortic dissection/pulmonary embolus.          Diagnoses as of 05/31/25 1511   Pneumonia of right lower lobe due to infectious organism   COPD exacerbation (Multi)   Elevated troponin   Leukocytosis, unspecified type   Anemia, unspecified type       Abnormal Labs Reviewed   CBC WITH AUTO DIFFERENTIAL - Abnormal; Notable for the following components:       Result Value    WBC 20.6 (*)     RBC 4.24 (*)     Hemoglobin 12.1 (*)     Hematocrit 36.1 (*)     RDW 15.9 (*)     Neutrophils Absolute 17.01 (*)     Monocytes Absolute 1.93 (*)     All other components within normal limits   COMPREHENSIVE METABOLIC PANEL - Abnormal; Notable for the following components:    Urea Nitrogen 36 (*)     All other components within normal limits   B-TYPE NATRIURETIC PEPTIDE - Abnormal; Notable for the following components:     (*)     All other components within normal limits    Narrative:        <100 pg/mL - Heart failure unlikely  100-299 pg/mL - Intermediate probability of acute heart                  failure exacerbation. Correlate with clinical                  context and patient history.    >=300 pg/mL - Heart Failure likely. Correlate with clinical                  context and patient history.    BNP testing is performed using different testing methodology at [facility] than at other [facility]. Direct result comparisons should only be made within the same method.      PROTIME-INR - Abnormal; Notable for the following components:    Protime 12.7 (*)     All other components within normal limits   LACTATE - Abnormal; Notable for the following components:    Lactate 2.3 (*)     All other components within normal limits    Narrative:     Venipuncture immediately after or during the administration of Metamizole may lead to falsely low results. Testing should be performed immediately prior to  Metamizole dosing.   SERIAL TROPONIN-INITIAL - Abnormal; Notable for the following components:    Troponin I, High Sensitivity 33 (*)     All other components within normal limits    Narrative:     Less than 99th percentile of normal range cutoff-  Female and children under 18 years old <14 ng/L; Male <21 ng/L: Negative  Repeat testing should be performed if clinically indicated.     Female and children under 18 years old 14-50 ng/L; Male 21-50 ng/L:  Consistent with possible cardiac damage and possible increased clinical   risk. Serial measurements may help to assess extent of myocardial damage.     >50 ng/L: Consistent with cardiac damage, increased clinical risk and  myocardial infarction. Serial measurements may help assess extent of   myocardial damage.      NOTE: Children less than 1 year old may have higher baseline troponin   levels and results should be interpreted in conjunction with the overall   clinical context.     NOTE: Troponin I testing is performed using a different   testing methodology at [facility] than at other   [facility]. Direct result comparisons should only   be made within the same method.   SERIAL TROPONIN, 1 HOUR - Abnormal; Notable for the following components:    Troponin I, High Sensitivity 30 (*)     All other components within normal limits    Narrative:     Less than 99th percentile of normal range cutoff-  Female and children under 18 years old <14 ng/L; Male <21 ng/L: Negative  Repeat testing should be performed if clinically indicated.     Female and children under 18 years old 14-50 ng/L; Male 21-50 ng/L:  Consistent with possible cardiac damage and possible increased clinical   risk. Serial measurements may help to assess extent of myocardial damage.     >50 ng/L: Consistent with cardiac damage, increased clinical risk and  myocardial infarction. Serial measurements may help assess extent of   myocardial damage.      NOTE: Children less than 1 year old may  have higher baseline troponin   levels and results should be interpreted in conjunction with the overall   clinical context.     NOTE: Troponin I testing is performed using a different   testing methodology at [facility] than at other   [facility] hospitals. Direct result comparisons should only   be made within the same method.       BP      Temp      Pulse     Resp      SpO2            Diagnostic evaluation was completed.  COVID, influenza and RSV are negative.  Initial troponin is elevated at 33.  Repeat troponin was downtrending at 30.  I do not believe this represents acute coronary syndrome.  BNP is slightly elevated at 146.  Metabolic panel shows a glucose of 91.  Sodium and potassium in the normal range.  BUN is elevated at 36 with a normal creatinine.  Liver function is in the normal range.  Lactate is slightly elevated at 2.3.  Repeat lactate was downtrending and in the normal range at 1.2.  INR is 1.1.  CBC shows an elevated white count of 20.6 with mild anemia with a hemoglobin of 12.1.  Platelets are in the normal range.  Chest x-ray shows parenchymal opacities versus calcified pleural plaques demonstrated overlying the right hemidiaphragm.  Platelike atelectasis right lower lobe.    Re-evaluation: Repeat evaluation at 12:58 PM shows the patient is feeling better.  Vital signs are stable.    Patient was treated with DuoNeb, IV Solu-Medrol, IV magnesium and IV doxycycline.    Medications administered improved the patient's condition.    I suspect the patient is likely suffering from COPD exacerbation with right lower lobe pneumonia.  The patient will be discharged home with short-term follow-up with his primary care provider.  He will be given antibiotics, steroids and inhaler.    I discussed the results and discharge plan with the patient and/or family/friend if present.  I emphasized the importance of follow up with the physician I referred them to in the timeframe recommended.  I explained  reasons for the patient to return to the Emergency Department.  Questions were addressed.  The patient and/or family/friend expressed understanding.      Patient was instructed to have follow up with primary doctor or specialist within 1-3 days.      Shared decision making made with patient, and/or family, who agrees with plan.      Escalation of care considered:     I considered hospitalization.  However, given the improvement in symptoms and reassuring workup, patient is appropriate for discharge and outpatient care. The risk of hospitalization outweighs the benefits. The patient is resting comfortably, well hydrated, tolerating PO, normal neuro exam, lungs CTA, ab soft NTND, not requiring supplemental O2/supportive care/IV medications or IVF.  Ambulating at baseline.    I do not suspect life threatening, emergent or urgent condition, currently.  Shared decision made with patient and/or family who agrees with plan.    Prescription Medication Consideration/Given:   ED Prescriptions       Medication Sig Dispense Start Date End Date Auth. Provider    albuterol 90 mcg/actuation inhaler Inhale 1-2 puffs every 6 hours if needed for wheezing. 18 g 5/31/2025 6/30/2025 Lenny VALERA MD    predniSONE (Deltasone) 20 mg tablet Take 3 tablets (60 mg) by mouth once daily for 5 days. 15 tablet 5/31/2025 6/5/2025 Lenny VALERA MD    doxycycline (Vibramycin) 100 mg capsule Take 1 capsule (100 mg) by mouth 2 times a day for 10 days. Take with at least 8 ounces (large glass) of water, do not lie down for 30 minutes after 20 capsule 5/31/2025 6/10/2025 Lenny VALERA MD            Diagnosis:   1. Pneumonia of right lower lobe due to infectious organism    2. COPD exacerbation (Multi)    3. Elevated troponin    4. Leukocytosis, unspecified type    5. Anemia, unspecified type              Procedure  Procedures       Lenny VALERA MD  05/31/25 1301       Lenny VALERA MD  05/31/25 1302       Lenny Rivera V  MD  05/31/25 1357         [1]   Past Medical History:  Diagnosis Date    COPD (chronic obstructive pulmonary disease) (Multi)     Coronary artery disease     Diabetes mellitus (Multi)     Hypertension    [2]   Past Surgical History:  Procedure Laterality Date    CARDIAC ELECTROPHYSIOLOGY PROCEDURE Left 07/09/2024    Procedure: Loop Recorder Explant;  Surgeon: Brain Valiente MD;  Location: [facility];  Service: Electrophysiology;  Laterality: Left;    CATARACT EXTRACTION      CORONARY ANGIOPLASTY WITH STENT PLACEMENT      CT ANGIO NECK  02/03/2021    CT NECK ANGIO W AND WO IV CONTRAST 2/3/2021 [facility] CLINICAL LEGACY    OTHER SURGICAL HISTORY  04/24/2019    Cervical laminectomy    OTHER SURGICAL HISTORY  10/21/2021    Knee replacement    OTHER SURGICAL HISTORY  10/21/2021    Percutaneous transluminal coronary angioplasty    OTHER SURGICAL HISTORY  10/21/2021    Kidney surgery    OTHER SURGICAL HISTORY  10/21/2021    Neck surgery    OTHER SURGICAL HISTORY  10/21/2021    Colonoscopy    OTHER SURGICAL HISTORY  10/21/2021    Tonsillectomy    OTHER SURGICAL HISTORY  10/21/2021    PTA carotid   [3]   Family History  Problem Relation Name Age of Onset    Tremor Father      Tremor Brother     [4]   Social History  Tobacco Use    Smoking status: Never    Smokeless tobacco: Never   Vaping Use    Vaping status: Never Used   Substance Use Topics    Alcohol use: Never    Drug use: Never   [5]   Allergies  Allergen Reactions    Penicillins Other and Unknown     From childhood. Unsure of reaction.   [6]   Past Medical History:  Diagnosis Date    COPD (chronic obstructive pulmonary disease) (Multi)     Coronary artery disease     Diabetes mellitus (Multi)     Hypertension    [7]   Past Surgical History:  Procedure Laterality Date    CARDIAC ELECTROPHYSIOLOGY PROCEDURE Left 07/09/2024    Procedure: Loop Recorder Explant;  Surgeon: Brain Valiente MD;  Location: [facility];  Service: Electrophysiology;  Laterality:  Left;    CATARACT EXTRACTION      CORONARY ANGIOPLASTY WITH STENT PLACEMENT      CT ANGIO NECK  02/03/2021    CT NECK ANGIO W AND WO IV CONTRAST 2/3/2021 [facility] CLINICAL LEGACY    OTHER SURGICAL HISTORY  04/24/2019    Cervical laminectomy    OTHER SURGICAL HISTORY  10/21/2021    Knee replacement    OTHER SURGICAL HISTORY  10/21/2021    Percutaneous transluminal coronary angioplasty    OTHER SURGICAL HISTORY  10/21/2021    Kidney surgery    OTHER SURGICAL HISTORY  10/21/2021    Neck surgery    OTHER SURGICAL HISTORY  10/21/2021    Colonoscopy    OTHER SURGICAL HISTORY  10/21/2021    Tonsillectomy    OTHER SURGICAL HISTORY  10/21/2021    PTA carotid   [8]   Family History  Problem Relation Name Age of Onset    Tremor Father      Tremor Brother     [9]   Social History  Tobacco Use    Smoking status: Never    Smokeless tobacco: Never   Vaping Use    Vaping status: Never Used   Substance Use Topics    Alcohol use: Never    Drug use: Never        Lenny VALERA MD  05/31/25 [id number]

## 2025-06-01 LAB
ATRIAL RATE: 81 BPM
ATRIAL RATE: 88 BPM
P AXIS: 52 DEGREES
P AXIS: 59 DEGREES
P OFFSET: 159 MS
P OFFSET: 188 MS
P ONSET: 126 MS
P ONSET: 129 MS
PR INTERVAL: 182 MS
PR INTERVAL: 186 MS
Q ONSET: 219 MS
Q ONSET: 220 MS
QRS COUNT: 13 BEATS
QRS COUNT: 15 BEATS
QRS DURATION: 122 MS
QRS DURATION: 126 MS
QT INTERVAL: 350 MS
QT INTERVAL: 366 MS
QTC CALCULATION(BAZETT): 423 MS
QTC CALCULATION(BAZETT): 425 MS
QTC FREDERICIA: 397 MS
QTC FREDERICIA: 404 MS
R AXIS: -25 DEGREES
R AXIS: -26 DEGREES
T AXIS: 88 DEGREES
T AXIS: 90 DEGREES
T OFFSET: 395 MS
T OFFSET: 402 MS
VENTRICULAR RATE: 81 BPM
VENTRICULAR RATE: 88 BPM

## 2025-06-03 ENCOUNTER — PATIENT OUTREACH (OUTPATIENT)
Dept: CARE COORDINATION | Facility: CLINIC | Age: 75
End: 2025-06-03
Payer: MEDICARE

## 2025-06-03 NOTE — PROGRESS NOTES
Outreach to patient post ED visit.  Reviewed discharge instructions, medications and the need to follow up with their primary care doctor.  At this time there are no further questions, and no further outreach is needed.  Diana PARDON, RN, [facility]  O: [phone]

## 2025-06-04 DIAGNOSIS — Z79.4 TYPE 2 DIABETES MELLITUS WITH DIABETIC PERIPHERAL ANGIOPATHY WITHOUT GANGRENE, WITH LONG-TERM CURRENT USE OF INSULIN (MULTI): ICD-10-CM

## 2025-06-04 DIAGNOSIS — E11.51 TYPE 2 DIABETES MELLITUS WITH DIABETIC PERIPHERAL ANGIOPATHY WITHOUT GANGRENE, WITH LONG-TERM CURRENT USE OF INSULIN (MULTI): ICD-10-CM

## 2025-06-04 RX ORDER — BLOOD SUGAR DIAGNOSTIC
STRIP MISCELLANEOUS
Qty: 250 EACH | Refills: 0 | Status: SHIPPED | OUTPATIENT
Start: 2025-06-04 | End: 2025-06-05

## 2025-06-05 DIAGNOSIS — E11.51 TYPE 2 DIABETES MELLITUS WITH DIABETIC PERIPHERAL ANGIOPATHY WITHOUT GANGRENE, WITH LONG-TERM CURRENT USE OF INSULIN (MULTI): ICD-10-CM

## 2025-06-05 DIAGNOSIS — Z79.4 TYPE 2 DIABETES MELLITUS WITH DIABETIC PERIPHERAL ANGIOPATHY WITHOUT GANGRENE, WITH LONG-TERM CURRENT USE OF INSULIN (MULTI): ICD-10-CM

## 2025-06-05 RX ORDER — BLOOD SUGAR DIAGNOSTIC
STRIP MISCELLANEOUS
Qty: 250 EACH | Refills: 1 | Status: SHIPPED | OUTPATIENT
Start: 2025-06-05

## 2025-06-12 ENCOUNTER — TELEPHONE (OUTPATIENT)
Dept: PRIMARY CARE | Facility: CLINIC | Age: 75
End: 2025-06-12
Payer: MEDICARE

## 2025-06-12 DIAGNOSIS — J44.1 CHRONIC OBSTRUCTIVE PULMONARY DISEASE WITH ACUTE EXACERBATION (MULTI): Primary | ICD-10-CM

## 2025-06-12 RX ORDER — LEVOFLOXACIN 500 MG/1
500 TABLET, FILM COATED ORAL DAILY
Qty: 5 TABLET | Refills: 0 | Status: SHIPPED | OUTPATIENT
Start: 2025-06-12 | End: 2025-06-17

## 2025-06-12 NOTE — TELEPHONE ENCOUNTER
Seen in ER on 5/31, dx with pneumonia still has cough with yellow phlegm, can you call something in?

## 2025-06-13 ENCOUNTER — APPOINTMENT (OUTPATIENT)
Dept: OPHTHALMOLOGY | Facility: CLINIC | Age: 75
End: 2025-06-13
Payer: MEDICARE

## 2025-06-16 DIAGNOSIS — Z79.4 TYPE 2 DIABETES MELLITUS WITH BOTH EYES AFFECTED BY MILD NONPROLIFERATIVE RETINOPATHY WITHOUT MACULAR EDEMA, WITH LONG-TERM CURRENT USE OF INSULIN: ICD-10-CM

## 2025-06-16 DIAGNOSIS — E11.3293 TYPE 2 DIABETES MELLITUS WITH BOTH EYES AFFECTED BY MILD NONPROLIFERATIVE RETINOPATHY WITHOUT MACULAR EDEMA, WITH LONG-TERM CURRENT USE OF INSULIN: ICD-10-CM

## 2025-06-16 RX ORDER — INSULIN ASPART 100 [IU]/ML
INJECTION, SOLUTION INTRAVENOUS; SUBCUTANEOUS
Qty: 45 ML | Refills: 3 | Status: SHIPPED | OUTPATIENT
Start: 2025-06-16

## 2025-06-20 ENCOUNTER — APPOINTMENT (OUTPATIENT)
Dept: OPHTHALMOLOGY | Facility: CLINIC | Age: 75
End: 2025-06-20
Payer: MEDICARE

## 2025-06-20 DIAGNOSIS — H42 NEOVASCULAR GLAUCOMA DUE TO TYPE 2 DIABETES MELLITUS (MULTI): Primary | ICD-10-CM

## 2025-06-20 DIAGNOSIS — E11.39 NEOVASCULAR GLAUCOMA DUE TO TYPE 2 DIABETES MELLITUS (MULTI): Primary | ICD-10-CM

## 2025-06-20 DIAGNOSIS — H34.8120 CENTRAL RETINAL VEIN OCCLUSION WITH MACULAR EDEMA OF LEFT EYE: ICD-10-CM

## 2025-06-20 DIAGNOSIS — H40.002 GLAUCOMA SUSPECT, LEFT: ICD-10-CM

## 2025-06-20 PROCEDURE — 92133 CPTRZD OPH DX IMG PST SGM ON: CPT | Performed by: OPHTHALMOLOGY

## 2025-06-20 PROCEDURE — 99214 OFFICE O/P EST MOD 30 MIN: CPT | Performed by: OPHTHALMOLOGY

## 2025-06-20 PROCEDURE — 92083 EXTENDED VISUAL FIELD XM: CPT | Performed by: OPHTHALMOLOGY

## 2025-06-20 RX ORDER — KETOROLAC TROMETHAMINE 4 MG/ML
1 SOLUTION/ DROPS OPHTHALMIC 4 TIMES DAILY
Qty: 5 ML | Refills: 3 | Status: SHIPPED | OUTPATIENT
Start: 2025-06-20

## 2025-06-20 RX ORDER — PREDNISOLONE ACETATE 10 MG/ML
1 SUSPENSION/ DROPS OPHTHALMIC 2 TIMES DAILY
Qty: 10 ML | Refills: 11 | Status: SHIPPED | OUTPATIENT
Start: 2025-06-20

## 2025-06-20 RX ORDER — BIMATOPROST 0.1 MG/ML
1 SOLUTION/ DROPS OPHTHALMIC NIGHTLY
Qty: 3 ML | Refills: 11 | Status: SHIPPED | OUTPATIENT
Start: 2025-06-20 | End: 2026-06-20

## 2025-06-20 RX ORDER — BRINZOLAMIDE/BRIMONIDINE TARTRATE 10; 2 MG/ML; MG/ML
1 SUSPENSION/ DROPS OPHTHALMIC 2 TIMES DAILY
Qty: 8 ML | Refills: 11 | Status: SHIPPED | OUTPATIENT
Start: 2025-06-20 | End: 2026-06-20

## 2025-06-20 ASSESSMENT — ENCOUNTER SYMPTOMS
RESPIRATORY NEGATIVE: 0
GASTROINTESTINAL NEGATIVE: 0
PSYCHIATRIC NEGATIVE: 0
CONSTITUTIONAL NEGATIVE: 0
MUSCULOSKELETAL NEGATIVE: 0
EYES NEGATIVE: 1
CARDIOVASCULAR NEGATIVE: 0
HEMATOLOGIC/LYMPHATIC NEGATIVE: 0
NEUROLOGICAL NEGATIVE: 0
ENDOCRINE NEGATIVE: 0
ALLERGIC/IMMUNOLOGIC NEGATIVE: 0

## 2025-06-20 ASSESSMENT — PACHYMETRY
OD_CT(UM): 700
OS_CT(UM): 656

## 2025-06-20 ASSESSMENT — SLIT LAMP EXAM - LIDS
COMMENTS: NORMAL
COMMENTS: NORMAL

## 2025-06-20 ASSESSMENT — TONOMETRY
OS_IOP_MMHG: 18
IOP_METHOD: GOLDMANN APPLANATION
OD_IOP_MMHG: 11

## 2025-06-20 ASSESSMENT — VISUAL ACUITY
OS_CC+: -3
OS_CC: 20/25
METHOD: SNELLEN - LINEAR
OD_CC: 20/CF@1FT
CORRECTION_TYPE: GLASSES

## 2025-06-20 ASSESSMENT — REFRACTION_WEARINGRX
OS_AXIS: 065
OD_ADD: +2.50
OD_CYLINDER: -0.75
OS_SPHERE: +1.00
OD_AXIS: 170
OD_SPHERE: +1.00
OS_ADD: +2.50
OS_CYLINDER: -1.50

## 2025-06-20 ASSESSMENT — EXTERNAL EXAM - LEFT EYE: OS_EXAM: NORMAL

## 2025-06-20 ASSESSMENT — CUP TO DISC RATIO
OS_RATIO: 0.3
OD_RATIO: 0.5

## 2025-06-20 ASSESSMENT — EXTERNAL EXAM - RIGHT EYE: OD_EXAM: NORMAL

## 2025-06-20 NOTE — PROGRESS NOTES
Visual Acuity (Snellen - Linear)         Right Left    Dist cc 20/CF@1ft 20/25 -3      Correction: Glasses          Tonometry       Tonometry (Goldmann Applanation, 10:26 AM)         Right Left    Pressure 11 18                  Assessment/Plan   Last dilated:  6/20/25    1.  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  IOP had been controlled, but recently had been elevating.  On gonio, there is no active NVA, but the angle is 90% scarred with PAS.  Given that IOP is only 30 mmHg on no meds, there is probably acqueous hyposecretion.  Will try medications first.  Pt with h/o quad bypass, so will avoid beta-blocker       On simbrinza and lumigan OD -> IOP much better.  IOP remains well controlled and still no need for surgery     Plan:  cont simbrinza OD BID               cont lumigan OD QHS               f/u 6 months      2.  Pseudophakia (PCIOL) OU:  minimal PCO OD, s/p YAG Cap OS 5/15/23 with dramatic improvement of VA      Plan:  monitor    3.  Proliferative Diabetic Retinopathy OU:  s/p PRP OU.  +CSME OS       Plan:  cont prednisolone and ketorolac OS QID                 As per Dr. Chapa

## 2025-06-30 ENCOUNTER — APPOINTMENT (OUTPATIENT)
Dept: OPHTHALMOLOGY | Facility: CLINIC | Age: 75
End: 2025-06-30
Payer: MEDICARE

## 2025-06-30 DIAGNOSIS — E11.39 NEOVASCULAR GLAUCOMA DUE TO TYPE 2 DIABETES MELLITUS (MULTI): ICD-10-CM

## 2025-06-30 DIAGNOSIS — H35.82 OCULAR ISCHEMIC SYNDROME: ICD-10-CM

## 2025-06-30 DIAGNOSIS — H42 NEOVASCULAR GLAUCOMA DUE TO TYPE 2 DIABETES MELLITUS (MULTI): ICD-10-CM

## 2025-06-30 DIAGNOSIS — H43.822 VITREOMACULAR TRACTION SYNDROME OF LEFT EYE: ICD-10-CM

## 2025-06-30 DIAGNOSIS — H34.8120 CENTRAL RETINAL VEIN OCCLUSION WITH MACULAR EDEMA OF LEFT EYE: Primary | ICD-10-CM

## 2025-06-30 DIAGNOSIS — E11.3511 PROLIFERATIVE DIABETIC RETINOPATHY OF RIGHT EYE WITH MACULAR EDEMA ASSOCIATED WITH TYPE 2 DIABETES MELLITUS: ICD-10-CM

## 2025-06-30 PROCEDURE — 92134 CPTRZ OPH DX IMG PST SGM RTA: CPT | Performed by: OPHTHALMOLOGY

## 2025-06-30 PROCEDURE — 99213 OFFICE O/P EST LOW 20 MIN: CPT | Performed by: OPHTHALMOLOGY

## 2025-06-30 ASSESSMENT — ENCOUNTER SYMPTOMS
CARDIOVASCULAR NEGATIVE: 1
GASTROINTESTINAL NEGATIVE: 0
ENDOCRINE NEGATIVE: 0
EYES NEGATIVE: 1
HEMATOLOGIC/LYMPHATIC NEGATIVE: 0
RESPIRATORY NEGATIVE: 0
ALLERGIC/IMMUNOLOGIC NEGATIVE: 0
MUSCULOSKELETAL NEGATIVE: 0
PSYCHIATRIC NEGATIVE: 0
CONSTITUTIONAL NEGATIVE: 0
NEUROLOGICAL NEGATIVE: 0

## 2025-06-30 ASSESSMENT — PACHYMETRY
OD_CT(UM): 700
OS_CT(UM): 656

## 2025-06-30 ASSESSMENT — SLIT LAMP EXAM - LIDS
COMMENTS: NORMAL
COMMENTS: NORMAL

## 2025-06-30 ASSESSMENT — EXTERNAL EXAM - RIGHT EYE: OD_EXAM: NORMAL

## 2025-06-30 ASSESSMENT — CUP TO DISC RATIO
OS_RATIO: 0.3
OD_RATIO: 0.5

## 2025-06-30 ASSESSMENT — TONOMETRY
OD_IOP_MMHG: 10
OS_IOP_MMHG: 15
IOP_METHOD: GOLDMANN APPLANATION

## 2025-06-30 ASSESSMENT — VISUAL ACUITY
METHOD: SNELLEN - LINEAR
OD_CC: CF@2'
OS_CC: 20/20

## 2025-06-30 ASSESSMENT — EXTERNAL EXAM - LEFT EYE: OS_EXAM: NORMAL

## 2025-06-30 NOTE — PROGRESS NOTES
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)  Ocular ischemic syndrome      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  10 H44.40 Hypotony of right eye-Improving  11 H35.352 Cystoid macular edema, left eye-Improving    DIAGNOSTIC PROCEDURE DONE  OCT DONE OD/OS  REASON FOR TEST: will help address and tailor  therapy by detecting subclinical CME SRF     Hi quality OCT  scans obtained  signal good    OCT OD - epiretinal membrane (ERM), abnormal Foveal Contour, inner retinal ischemic atrophy No Edema, IS/OS Junction focally attenuated subfovealy  OCT OS - VMT with edema, IS/OS Junction Normal      TODAY   (OU)   - OCT Macula By:Andres Chapa  Neovascular Glaucoma, OD  Proliferative diabetic retinopathy, OD  - Likely secondary to Ocular Ischemic Syndrome s/p Recent Carotid Endarterectomy due to 90% occlusion and DM  - s/p PPV EL FAX RANDA ([place] 10/9/20)  - Florid NVI on initial visit, IOP 56. Following CPC diode laser [place]/Mona, IOP today 2.  - Retina attached all 4 quadrants, with good PRP laser, shallow choroidals  peripherally s/p CPC diode laser  - continue care with Dr. Rosas  - Continue atropine BID and prednisolone QID   left eye DOES NOT HAVE PDR      #VMT with cystoid macular edema (CME) OS  VMT is stable, patient' s vision is stable 20/25 and he is denying metamorphosia  R/b of pars plana vitrectomy (PPV) os were discussed, will defer surgery for now.     Left eye  has a PCO that is interfering w retina care     today there is an element of VMT OS, stable    consider PPV also OS if warranted  Continue ketorolac QID and pred BID   since VA OS is good    3m f/u

## 2025-07-02 ENCOUNTER — PATIENT OUTREACH (OUTPATIENT)
Dept: CARE COORDINATION | Facility: CLINIC | Age: 75
End: 2025-07-02
Payer: MEDICARE

## 2025-07-02 LAB
ALBUMIN SERPL-MCNC: 4 G/DL (ref 3.6–5.1)
ALP SERPL-CCNC: 125 U/L (ref 35–144)
ALT SERPL-CCNC: 19 U/L (ref 9–46)
ANION GAP SERPL CALCULATED.4IONS-SCNC: 11 MMOL/L (CALC) (ref 7–17)
AST SERPL-CCNC: 14 U/L (ref 10–35)
BILIRUB SERPL-MCNC: 0.5 MG/DL (ref 0.2–1.2)
BUN SERPL-MCNC: 32 MG/DL (ref 7–25)
CALCIUM SERPL-MCNC: 9.5 MG/DL (ref 8.6–10.3)
CHLORIDE SERPL-SCNC: 102 MMOL/L (ref 98–110)
CHOLEST SERPL-MCNC: 96 MG/DL
CHOLEST/HDLC SERPL: 3.1 (CALC)
CO2 SERPL-SCNC: 28 MMOL/L (ref 20–32)
CREAT SERPL-MCNC: 1.16 MG/DL (ref 0.7–1.28)
EGFRCR SERPLBLD CKD-EPI 2021: 66 ML/MIN/1.73M2
EST. AVERAGE GLUCOSE BLD GHB EST-MCNC: 212 MG/DL
EST. AVERAGE GLUCOSE BLD GHB EST-SCNC: 11.7 MMOL/L
GLUCOSE SERPL-MCNC: 62 MG/DL (ref 65–99)
HBA1C MFR BLD: 9 %
HDLC SERPL-MCNC: 31 MG/DL
LDLC SERPL CALC-MCNC: 50 MG/DL (CALC)
NONHDLC SERPL-MCNC: 65 MG/DL (CALC)
POTASSIUM SERPL-SCNC: 3.9 MMOL/L (ref 3.5–5.3)
PROT SERPL-MCNC: 6.6 G/DL (ref 6.1–8.1)
SODIUM SERPL-SCNC: 141 MMOL/L (ref 135–146)
TRIGL SERPL-MCNC: 70 MG/DL

## 2025-07-02 NOTE — PROGRESS NOTES
Care Manager:  Left a vm for Haile to assess for any needs or questions at this time.   Will continue to follow.  Diana DAWKINS, RN, [facility]  O: [phone]

## 2025-07-08 ENCOUNTER — APPOINTMENT (OUTPATIENT)
Dept: PRIMARY CARE | Facility: CLINIC | Age: 75
End: 2025-07-08
Payer: MEDICARE

## 2025-07-08 VITALS
DIASTOLIC BLOOD PRESSURE: 78 MMHG | HEART RATE: 69 BPM | BODY MASS INDEX: 35.31 KG/M2 | WEIGHT: 284 LBS | HEIGHT: 75 IN | TEMPERATURE: 95.9 F | SYSTOLIC BLOOD PRESSURE: 120 MMHG

## 2025-07-08 DIAGNOSIS — J44.9 CHRONIC OBSTRUCTIVE PULMONARY DISEASE, UNSPECIFIED COPD TYPE (MULTI): ICD-10-CM

## 2025-07-08 DIAGNOSIS — I10 ESSENTIAL HYPERTENSION, BENIGN: Primary | ICD-10-CM

## 2025-07-08 DIAGNOSIS — E66.812 CLASS 2 SEVERE OBESITY DUE TO EXCESS CALORIES WITH SERIOUS COMORBIDITY AND BODY MASS INDEX (BMI) OF 35.0 TO 35.9 IN ADULT: ICD-10-CM

## 2025-07-08 DIAGNOSIS — Z79.4 TYPE 2 DIABETES MELLITUS WITH BOTH EYES AFFECTED BY MILD NONPROLIFERATIVE RETINOPATHY WITHOUT MACULAR EDEMA, WITH LONG-TERM CURRENT USE OF INSULIN: ICD-10-CM

## 2025-07-08 DIAGNOSIS — E66.01 CLASS 2 SEVERE OBESITY DUE TO EXCESS CALORIES WITH SERIOUS COMORBIDITY AND BODY MASS INDEX (BMI) OF 35.0 TO 35.9 IN ADULT: ICD-10-CM

## 2025-07-08 DIAGNOSIS — Z98.61 CAD S/P PERCUTANEOUS CORONARY ANGIOPLASTY: ICD-10-CM

## 2025-07-08 DIAGNOSIS — E11.3293 TYPE 2 DIABETES MELLITUS WITH BOTH EYES AFFECTED BY MILD NONPROLIFERATIVE RETINOPATHY WITHOUT MACULAR EDEMA, WITH LONG-TERM CURRENT USE OF INSULIN: ICD-10-CM

## 2025-07-08 DIAGNOSIS — I25.10 CAD S/P PERCUTANEOUS CORONARY ANGIOPLASTY: ICD-10-CM

## 2025-07-08 PROCEDURE — 1160F RVW MEDS BY RX/DR IN RCRD: CPT | Performed by: INTERNAL MEDICINE

## 2025-07-08 PROCEDURE — 3078F DIAST BP <80 MM HG: CPT | Performed by: INTERNAL MEDICINE

## 2025-07-08 PROCEDURE — 3074F SYST BP LT 130 MM HG: CPT | Performed by: INTERNAL MEDICINE

## 2025-07-08 PROCEDURE — 3008F BODY MASS INDEX DOCD: CPT | Performed by: INTERNAL MEDICINE

## 2025-07-08 PROCEDURE — 99213 OFFICE O/P EST LOW 20 MIN: CPT | Performed by: INTERNAL MEDICINE

## 2025-07-08 PROCEDURE — 1159F MED LIST DOCD IN RCRD: CPT | Performed by: INTERNAL MEDICINE

## 2025-07-08 PROCEDURE — 1036F TOBACCO NON-USER: CPT | Performed by: INTERNAL MEDICINE

## 2025-07-08 PROCEDURE — 3048F LDL-C <100 MG/DL: CPT | Performed by: INTERNAL MEDICINE

## 2025-07-08 PROCEDURE — 4010F ACE/ARB THERAPY RXD/TAKEN: CPT | Performed by: INTERNAL MEDICINE

## 2025-07-08 PROCEDURE — 3052F HG A1C>EQUAL 8.0%<EQUAL 9.0%: CPT | Performed by: INTERNAL MEDICINE

## 2025-07-08 PROCEDURE — G8433 SCR FOR DEP NOT CPT DOC RSN: HCPCS | Performed by: INTERNAL MEDICINE

## 2025-07-08 ASSESSMENT — ENCOUNTER SYMPTOMS
CARDIOVASCULAR NEGATIVE: 1
GASTROINTESTINAL NEGATIVE: 1
WEAKNESS: 0
CONSTITUTIONAL NEGATIVE: 1
OCCASIONAL FEELINGS OF UNSTEADINESS: 1
DEPRESSION: 0
DIZZINESS: 0
RESPIRATORY NEGATIVE: 1
WOUND: 0
LOSS OF SENSATION IN FEET: 0
MUSCULOSKELETAL NEGATIVE: 1

## 2025-07-08 ASSESSMENT — PATIENT HEALTH QUESTIONNAIRE - PHQ9
SUM OF ALL RESPONSES TO PHQ9 QUESTIONS 1 AND 2: 0
1. LITTLE INTEREST OR PLEASURE IN DOING THINGS: NOT AT ALL
2. FEELING DOWN, DEPRESSED OR HOPELESS: NOT AT ALL

## 2025-07-08 NOTE — PROGRESS NOTES
Subjective   Patient ID: Haile Mcfarland is a 74 y.o. male who presents for Follow-up (3 mo fu).  HPI  Heart Problem  This is a chronic problem. The current episode started more than 1 year ago. The problem occurs intermittently. The problem has been resolved. Associated symptoms include a visual change. Pertinent negatives include no abdominal pain, chills, coughing, diaphoresis or fatigue. The treatment provided mild relief.   Diabetes  He presents for his follow-up diabetic visit. He has type 2 diabetes mellitus. No MedicAlert identification noted. His disease course has been stable. Associated symptoms include blurred vision, foot paresthesias and visual change. Pertinent negatives for diabetes include no fatigue. Symptoms are stable. Diabetic complications include heart disease and peripheral neuropathy. Risk factors for coronary artery disease include dyslipidemia, hypertension and obesity. Current diabetic treatment includes insulin injections and oral agent (monotherapy). He is compliant with treatment all of the time. Rx around 8%  COPD: Patient complains of dyspnea. Symptoms began several years ago. Symptoms chronic dyspnea does worsen with exertion. Sputum is clear in small amounts. Fever has been no fever. Patient uses 1 pillows at night. Patient can walk 500 feet before resting. Patient currently is not on home oxygen therapy.. Respiratory history: chronic bronchitis, COPD, and emphysema, had recent admission.  Past Medical History  Medical History[1]    Social History  Social History[2]    Family History   Family History[3]    Allergies:  RX Allergies[4]     Outpatient Medications:  Current Outpatient Medications   Medication Instructions    albuterol (Ventolin HFA) 90 mcg/actuation inhaler 2 puffs, inhalation, Every 6 hours PRN    amLODIPine (NORVASC) 10 mg, oral, Every evening    aspirin 81 mg EC tablet 1 tablet, Daily    atorvastatin (LIPITOR) 80 mg, oral, Nightly    clopidogrel (PLAVIX) 75 mg,  "oral, Daily    Contour Next Test Strips USE 1 STRIP TO CHECK GLUCOSE THREE TIMES DAILY    fluticasone-umeclidin-vilanter (Trelegy Ellipta) 100-62.5-25 mcg blister with device 1 puff, inhalation, Daily, Rinse mouth with water after use to reduce aftertaste and incidence of candidiasis. Do not swallow.    hydroCHLOROthiazide (HYDRODIURIL) 25 mg, oral, Daily before breakfast    insulin glargine (Toujeo SoloStar U-300 Insulin) 300 unit/mL (1.5 mL) injection INJECT 50 UNITS SUBCUTANEOUSLY TWICE DAILY    ipratropium-albuteroL (Duo-Neb) 0.5-2.5 mg/3 mL nebulizer solution USE 1 AMPULE IN NEBULIZER EVERY 8 HOURS AS NEEDED FOR WHEEZING FOR SHORTNESS OF BREATH    ketorolac (Acular) 0.4 % ophthalmic solution 1 drop, Left Eye, 4 times daily    losartan (COZAAR) 50 mg, oral, Daily    Lumigan 0.01 % ophthalmic solution 1 drop, Right Eye, Nightly    magnesium oxide (Mag-Ox) 400 mg (241.3 mg magnesium) tablet 1 tablet, Daily    metFORMIN (GLUCOPHAGE) 500 mg, oral, 2 times daily (morning and late afternoon)    metoprolol tartrate (LOPRESSOR) 25 mg, oral, 2 times daily    nitroglycerin (Nitrostat) 0.4 mg SL tablet DISSOLVE ONE TABLET UNDER THE TONGUE EVERY 5 MINUTES AS NEEDED FOR CHEST PAIN.  DO NOT EXCEED A TOTAL OF 3 DOSES IN 15 MINUTES    NovoLOG Flexpen U-100 Insulin 100 unit/mL (3 mL) pen INJECT 25 UNITS SUBCUTANEOUSLY THREE TIMES DAILY BEFORE MEAL(S)    pen needle, diabetic 32 gauge x 5/32\" needle USE 1 NEEDLE 3 TO 4 TIMES DAILY AS NEEDED    prednisoLONE acetate (Pred-Forte) 1 % ophthalmic suspension 1 drop, Left Eye, 2 times daily    primidone (Mysoline) 50 mg tablet 25 mg at bedtime for a week, then 50 mg at bedtime for a week, then keep raising by 25 mg each night until goal dose of 200 mg at bedtime    Simbrinza 1-0.2 % drops,suspension ophthalmic suspension 1 drop, Left Eye, 2 times daily        Review of Systems   Constitutional: Negative.    Eyes:  Positive for visual disturbance.   Respiratory: Negative.   " "  Cardiovascular: Negative.    Gastrointestinal: Negative.    Musculoskeletal: Negative.    Skin:  Negative for wound.   Neurological:  Negative for dizziness and weakness.         Objective       Physical Exam  Vitals reviewed.   Constitutional:       Appearance: Normal appearance. He is normal weight.   HENT:      Head: Normocephalic.   Eyes:      Conjunctiva/sclera: Conjunctivae normal.   Cardiovascular:      Rate and Rhythm: Normal rate and regular rhythm.      Pulses: Normal pulses.   Pulmonary:      Effort: Pulmonary effort is normal.      Breath sounds: Normal breath sounds. No wheezing or rales.   Abdominal:      General: Abdomen is protuberant.      Palpations: Abdomen is soft.   Musculoskeletal:         General: Normal range of motion.      Cervical back: Neck supple.   Skin:     General: Skin is warm and dry.   Neurological:      General: No focal deficit present.   Psychiatric:         Mood and Affect: Mood normal.     /78 (BP Location: Left arm, Patient Position: Sitting, BP Cuff Size: Adult)   Pulse 69   Temp 35.5 °C (95.9 °F) (Temporal)   Ht 1.905 m (6' 3\")   Wt 129 kg (284 lb)   BMI 35.50 kg/m²      Assessment/Plan   Problem List Items Addressed This Visit       Chronic obstructive pulmonary disease (Multi)    Essential hypertension, benign - Primary    Type 2 diabetes mellitus 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 edema    CAD S/P percutaneous coronary angioplasty    Class 2 severe obesity due to excess calories with serious comorbidity and body mass index (BMI) of 35.0 to 35.9 in adult   Pt has moderate to severe COPD. It is progressive disease, use of MDI and proper technique discussed, rinse mouth after inhaled corticosteroids. Notify if progressive dyspnea or cough or lethargy, monitor oxygen saturation if possible with home based pulse oximetry. Avoid hospitalization and call us if any flare ups are about to happen, be sure that annual flu vaccine are " uptodate and review need for pneumococcal vaccine. Light to moderate low impact exercises are very helpful and deep breathing  exercises are beneficial in chronic lung diseases.  Cannot push more for hemoglobin A1c reduction, he remains outside the hospital, no major respiratory compromise, he is asking for another pulmonary so referral was given for local pulmonary, he will be satisfied with this pulmonary care, insulin dose will not be altered, severe COPD, recurrent pneumonia, poor pulmonary defense mechanism remains, does not require oxygen, he is a tall heavyset male patient, no hypoglycemia to be symptomatic although blood sugar was low on the laboratories, BP readings are excellent, has significant diabetic retinopathy, visual impairment remains, no chest pains or angina, class II obesity remains, BP readings are stable, microalbumin will be checked, no further change in her diabetes regimen further push to reduce hemoglobin A1c will bring hypoglycemia.  He is very particular about his pulmonary health and wellbeing, laboratories next time, follow-up in 3 months.         [1] History reviewed. No pertinent past medical history.  [2]   Social History  Tobacco Use    Smoking status: Never    Smokeless tobacco: Never   Vaping Use    Vaping status: Never Used   Substance Use Topics    Alcohol use: Never    Drug use: Never   [3]   Family History  Problem Relation Name Age of Onset    Tremor Father      Tremor Brother     [4]   Allergies  Allergen Reactions    Penicillins Other and Unknown     From childhood. Unsure of reaction.

## 2025-07-17 ENCOUNTER — APPOINTMENT (OUTPATIENT)
Facility: CLINIC | Age: 75
End: 2025-07-17
Payer: MEDICARE

## 2025-08-04 ENCOUNTER — PATIENT OUTREACH (OUTPATIENT)
Dept: CARE COORDINATION | Facility: CLINIC | Age: 75
End: 2025-08-04
Payer: MEDICARE

## 2025-08-04 NOTE — PROGRESS NOTES
Care Management:    Attempted outreach to assess for any needs or questions.  Left a vm.  Diana PARDON, RN, [facility]  O: [phone]

## 2025-08-13 DIAGNOSIS — J44.9 CHRONIC OBSTRUCTIVE PULMONARY DISEASE, UNSPECIFIED COPD TYPE (MULTI): ICD-10-CM

## 2025-08-14 RX ORDER — IPRATROPIUM BROMIDE AND ALBUTEROL SULFATE 2.5; .5 MG/3ML; MG/3ML
SOLUTION RESPIRATORY (INHALATION)
Qty: 180 ML | Refills: 3 | Status: SHIPPED | OUTPATIENT
Start: 2025-08-14

## 2025-08-29 DIAGNOSIS — I25.10 CAD S/P PERCUTANEOUS CORONARY ANGIOPLASTY: ICD-10-CM

## 2025-08-29 DIAGNOSIS — I10 ESSENTIAL HYPERTENSION, BENIGN: ICD-10-CM

## 2025-08-29 DIAGNOSIS — Z98.61 CAD S/P PERCUTANEOUS CORONARY ANGIOPLASTY: ICD-10-CM

## 2025-09-02 DIAGNOSIS — I10 ESSENTIAL HYPERTENSION, BENIGN: ICD-10-CM

## 2025-09-02 DIAGNOSIS — Z98.61 CAD S/P PERCUTANEOUS CORONARY ANGIOPLASTY: ICD-10-CM

## 2025-09-02 DIAGNOSIS — I25.10 CAD S/P PERCUTANEOUS CORONARY ANGIOPLASTY: ICD-10-CM

## 2025-09-02 RX ORDER — LOSARTAN POTASSIUM 50 MG/1
50 TABLET ORAL DAILY
Qty: 90 TABLET | Refills: 0 | Status: SHIPPED | OUTPATIENT
Start: 2025-09-02

## 2025-09-02 RX ORDER — METOPROLOL TARTRATE 25 MG/1
25 TABLET, FILM COATED ORAL 2 TIMES DAILY
Qty: 60 TABLET | Refills: 0 | Status: SHIPPED | OUTPATIENT
Start: 2025-09-02

## 2025-09-02 RX ORDER — LOSARTAN POTASSIUM 50 MG/1
50 TABLET ORAL DAILY
Qty: 90 TABLET | Refills: 0 | OUTPATIENT
Start: 2025-09-02

## 2025-09-02 RX ORDER — METOPROLOL TARTRATE 25 MG/1
25 TABLET, FILM COATED ORAL 2 TIMES DAILY
Qty: 60 TABLET | Refills: 0 | OUTPATIENT
Start: 2025-09-02

## 2025-09-04 ENCOUNTER — PATIENT OUTREACH (OUTPATIENT)
Dept: CARE COORDINATION | Facility: CLINIC | Age: 75
End: 2025-09-04
Payer: MEDICARE

## 2025-09-07 LAB
EST. AVERAGE GLUCOSE BLD GHB EST-MCNC: 194 MG/DL
EST. AVERAGE GLUCOSE BLD GHB EST-SCNC: 10.8 MMOL/L
HBA1C MFR BLD: 8.4 %

## 2025-09-10 ENCOUNTER — APPOINTMENT (OUTPATIENT)
Dept: CARDIOLOGY | Facility: CLINIC | Age: 75
End: 2025-09-10
Payer: MEDICARE

## 2025-10-06 ENCOUNTER — APPOINTMENT (OUTPATIENT)
Dept: OPHTHALMOLOGY | Facility: CLINIC | Age: 75
End: 2025-10-06
Payer: MEDICARE

## 2025-10-08 ENCOUNTER — APPOINTMENT (OUTPATIENT)
Dept: PRIMARY CARE | Facility: CLINIC | Age: 75
End: 2025-10-08
Payer: MEDICARE

## 2025-12-26 ENCOUNTER — APPOINTMENT (OUTPATIENT)
Dept: OPHTHALMOLOGY | Facility: CLINIC | Age: 75
End: 2025-12-26
Payer: MEDICARE

## (undated) DEVICE — DRESSING, AQUACEL AG, HYDROFIBER W/SILVER, 3.5 X 3.75 IN